# Patient Record
Sex: FEMALE | Race: WHITE | Employment: UNEMPLOYED | URBAN - METROPOLITAN AREA
[De-identification: names, ages, dates, MRNs, and addresses within clinical notes are randomized per-mention and may not be internally consistent; named-entity substitution may affect disease eponyms.]

---

## 2017-03-30 ENCOUNTER — OFFICE VISIT (OUTPATIENT)
Dept: INTERNAL MEDICINE CLINIC | Facility: CLINIC | Age: 62
End: 2017-03-30

## 2017-03-30 VITALS
HEIGHT: 60 IN | TEMPERATURE: 99 F | BODY MASS INDEX: 54.19 KG/M2 | SYSTOLIC BLOOD PRESSURE: 139 MMHG | HEART RATE: 89 BPM | DIASTOLIC BLOOD PRESSURE: 79 MMHG | WEIGHT: 276 LBS

## 2017-03-30 DIAGNOSIS — J01.90 ACUTE NON-RECURRENT SINUSITIS, UNSPECIFIED LOCATION: Primary | ICD-10-CM

## 2017-03-30 DIAGNOSIS — R73.03 PREDIABETES: ICD-10-CM

## 2017-03-30 DIAGNOSIS — E66.9 OBESITY, UNSPECIFIED OBESITY SEVERITY, UNSPECIFIED OBESITY TYPE: ICD-10-CM

## 2017-03-30 DIAGNOSIS — E53.8 VITAMIN B12 DEFICIENCY: ICD-10-CM

## 2017-03-30 DIAGNOSIS — E78.2 MIXED HYPERLIPIDEMIA: ICD-10-CM

## 2017-03-30 PROCEDURE — 99212 OFFICE O/P EST SF 10 MIN: CPT | Performed by: INTERNAL MEDICINE

## 2017-03-30 PROCEDURE — 99213 OFFICE O/P EST LOW 20 MIN: CPT | Performed by: INTERNAL MEDICINE

## 2017-03-30 RX ORDER — FLUTICASONE PROPIONATE 50 MCG
SPRAY, SUSPENSION (ML) NASAL
Qty: 1 INHALER | Refills: 2 | Status: SHIPPED | OUTPATIENT
Start: 2017-03-30

## 2017-03-30 RX ORDER — DOXYCYCLINE 100 MG/1
100 TABLET ORAL 2 TIMES DAILY
Qty: 20 TABLET | Refills: 0 | Status: SHIPPED | OUTPATIENT
Start: 2017-03-30 | End: 2017-04-28 | Stop reason: ALTCHOICE

## 2017-03-30 NOTE — PROGRESS NOTES
HPI:    Patient ID: Cata Saleem is a 65year old female. Sinus Problem  This is a new problem. The current episode started in the past 7 days (4 days). The problem is unchanged. There has been no fever. The pain is moderate.  Associated symptoms i cyanocobalamin 1000 MCG/ML Injection Solution Inject 1 ml (1,000 mcg total) into the muscle every 15 days Disp: 24 vial Rfl: 0     Allergies:  Cefprozil                   Comment:Other reaction(s): CEFPROZIL   PHYSICAL EXAM:   Physical Exam   Constitutio unspecified obesity type  Plan: pt had been having difficulty of losing weight inspite of exercise or diet. We will check her tsh. (E53.8) Vitamin B12 deficiency  Plan: VITAMIN B12        Pt had been getting her vit B 12 shot at home.  Recheck vit B12 le

## 2017-04-11 ENCOUNTER — OFFICE VISIT (OUTPATIENT)
Dept: INTERNAL MEDICINE CLINIC | Facility: CLINIC | Age: 62
End: 2017-04-11

## 2017-04-11 ENCOUNTER — HOSPITAL ENCOUNTER (OUTPATIENT)
Dept: GENERAL RADIOLOGY | Age: 62
Discharge: HOME | End: 2017-04-11
Attending: INTERNAL MEDICINE | Admitting: INTERNAL MEDICINE
Payer: COMMERCIAL

## 2017-04-11 VITALS
WEIGHT: 275 LBS | HEART RATE: 84 BPM | SYSTOLIC BLOOD PRESSURE: 134 MMHG | TEMPERATURE: 98 F | RESPIRATION RATE: 12 BRPM | HEIGHT: 61 IN | OXYGEN SATURATION: 95 % | BODY MASS INDEX: 51.92 KG/M2 | DIASTOLIC BLOOD PRESSURE: 80 MMHG

## 2017-04-11 DIAGNOSIS — R05.9 COUGH: ICD-10-CM

## 2017-04-11 DIAGNOSIS — R19.7 DIARRHEA OF PRESUMED INFECTIOUS ORIGIN: ICD-10-CM

## 2017-04-11 DIAGNOSIS — R05.9 COUGH: Primary | ICD-10-CM

## 2017-04-11 PROCEDURE — 99212 OFFICE O/P EST SF 10 MIN: CPT | Performed by: INTERNAL MEDICINE

## 2017-04-11 PROCEDURE — 99213 OFFICE O/P EST LOW 20 MIN: CPT | Performed by: INTERNAL MEDICINE

## 2017-04-11 PROCEDURE — 71020 XR CHEST PA + LAT CHEST (CPT=71020): CPT

## 2017-04-11 RX ORDER — CODEINE PHOSPHATE AND GUAIFENESIN 10; 100 MG/5ML; MG/5ML
5 SOLUTION ORAL EVERY 6 HOURS PRN
Qty: 240 ML | Refills: 0 | Status: SHIPPED | OUTPATIENT
Start: 2017-04-11 | End: 2017-08-17 | Stop reason: ALTCHOICE

## 2017-04-11 NOTE — PROGRESS NOTES
HPI:    Patient ID: Tony Cho is a 65year old female. Cough  This is a new problem. The current episode started 1 to 4 weeks ago. The problem has been waxing and waning. The cough is non-productive.  Associated symptoms include nasal congestion tablet by mouth every 6 (six) hours as needed. Disp:  Rfl: 0   furosemide 20 MG Oral Tab Take 1 tablet (20 mg total) by mouth daily.  (Patient taking differently: Take 20 mg by mouth daily as needed.  ) Disp: 30 tablet Rfl: 0   Potassium Chloride ER 10 MEQ noted. She is not diaphoretic. ASSESSMENT/PLAN:   (R05) Cough  (primary encounter diagnosis)  Plan: XR CHEST PA + LAT CHEST ([id number]=[id number])        We did do cxr and showed no acute pulmonary changes or pneumonia.  I told pt this is likely just acut

## 2017-04-14 ENCOUNTER — APPOINTMENT (OUTPATIENT)
Dept: LAB | Age: 62
End: 2017-04-14
Attending: INTERNAL MEDICINE
Payer: COMMERCIAL

## 2017-04-14 DIAGNOSIS — R19.7 DIARRHEA OF PRESUMED INFECTIOUS ORIGIN: ICD-10-CM

## 2017-04-14 PROCEDURE — 87493 C DIFF AMPLIFIED PROBE: CPT

## 2017-04-26 ENCOUNTER — TELEPHONE (OUTPATIENT)
Dept: INTERNAL MEDICINE CLINIC | Facility: CLINIC | Age: 62
End: 2017-04-26

## 2017-04-26 NOTE — TELEPHONE ENCOUNTER
Actions Requested: Dr Oliver Poe can you add the patient to today's schedule? 2 day onset eye pain, bilat blurry vision, yellow crusty eye discharge, pink sclera, swelling eye lids  Situation/Background   Problem: bilat blurry vision with yellow crusty eye 104 F (40 C)  * Discharge from penis  * New or abnormal vaginal discharge  * Using antibiotic eyedrops > 3 days but pus persists  * Lots of yellow or green nasal discharge  * Weak immune system (e.g., HIV positive, cancer chemo, splenectomy, organ transpla

## 2017-04-27 NOTE — TELEPHONE ENCOUNTER
Booked appoint for tomorrow per patient, she has to take her sister to the airport and tomorrow is better for her to see the doctor

## 2017-04-28 ENCOUNTER — OFFICE VISIT (OUTPATIENT)
Dept: INTERNAL MEDICINE CLINIC | Facility: CLINIC | Age: 62
End: 2017-04-28

## 2017-04-28 VITALS
TEMPERATURE: 98 F | HEART RATE: 77 BPM | WEIGHT: 274.31 LBS | SYSTOLIC BLOOD PRESSURE: 134 MMHG | BODY MASS INDEX: 52 KG/M2 | DIASTOLIC BLOOD PRESSURE: 80 MMHG

## 2017-04-28 DIAGNOSIS — H10.33 ACUTE CONJUNCTIVITIS OF BOTH EYES, UNSPECIFIED ACUTE CONJUNCTIVITIS TYPE: Primary | ICD-10-CM

## 2017-04-28 PROCEDURE — 99213 OFFICE O/P EST LOW 20 MIN: CPT | Performed by: INTERNAL MEDICINE

## 2017-04-28 PROCEDURE — 99212 OFFICE O/P EST SF 10 MIN: CPT | Performed by: INTERNAL MEDICINE

## 2017-04-28 RX ORDER — TOBRAMYCIN 3 MG/ML
1 SOLUTION/ DROPS OPHTHALMIC EVERY 6 HOURS
Qty: 1 BOTTLE | Refills: 0 | Status: SHIPPED | OUTPATIENT
Start: 2017-04-28 | End: 2017-08-17 | Stop reason: ALTCHOICE

## 2017-04-29 NOTE — PROGRESS NOTES
HPI:    Patient ID: Kiley Escalera is a 65year old female. Conjunctivitis  This is a new problem. The current episode started in the past 7 days. The problem occurs constantly. The problem has been gradually improving.  Associated symptoms include c Allergies:  Cefprozil                   Comment:Other reaction(s): CEFPROZIL   PHYSICAL EXAM:   Physical Exam   Constitutional: She appears well-developed. No distress.    obese   HENT:   Right Ear: External ear normal.   Left Ear: External ear normal.

## 2017-05-25 ENCOUNTER — OFFICE VISIT (OUTPATIENT)
Dept: INTERNAL MEDICINE CLINIC | Facility: CLINIC | Age: 62
End: 2017-05-25

## 2017-05-25 VITALS
SYSTOLIC BLOOD PRESSURE: 128 MMHG | TEMPERATURE: 98 F | BODY MASS INDEX: 51.35 KG/M2 | DIASTOLIC BLOOD PRESSURE: 78 MMHG | HEIGHT: 61 IN | HEART RATE: 91 BPM | WEIGHT: 272 LBS

## 2017-05-25 DIAGNOSIS — N39.0 URINARY TRACT INFECTION WITHOUT HEMATURIA, SITE UNSPECIFIED: Primary | ICD-10-CM

## 2017-05-25 PROCEDURE — 99213 OFFICE O/P EST LOW 20 MIN: CPT | Performed by: INTERNAL MEDICINE

## 2017-05-25 PROCEDURE — 81002 URINALYSIS NONAUTO W/O SCOPE: CPT | Performed by: INTERNAL MEDICINE

## 2017-05-25 RX ORDER — SULFAMETHOXAZOLE AND TRIMETHOPRIM 800; 160 MG/1; MG/1
1 TABLET ORAL 2 TIMES DAILY
Qty: 20 TABLET | Refills: 0 | Status: ON HOLD | OUTPATIENT
Start: 2017-05-25 | End: 2017-11-13

## 2017-05-25 NOTE — PROGRESS NOTES
HPI:    Patient ID: Nimesh Cordero is a 65year old female. UTI  This is a new problem. The current episode started in the past 7 days (2 days). The problem occurs constantly. The problem has been unchanged.  Associated symptoms include urinary sympt ML Does not apply Misc Use 2 times montly Disp: 100 each Rfl: 0     Allergies:  Cefprozil                   Comment:Other reaction(s): CEFPROZIL   PHYSICAL EXAM:   Physical Exam   Constitutional: She appears well-developed. No distress.    Obese     HENT:

## 2017-06-07 ENCOUNTER — OFFICE VISIT (OUTPATIENT)
Dept: INTERNAL MEDICINE CLINIC | Facility: CLINIC | Age: 62
End: 2017-06-07

## 2017-06-07 VITALS
WEIGHT: 272 LBS | HEART RATE: 82 BPM | TEMPERATURE: 97 F | DIASTOLIC BLOOD PRESSURE: 61 MMHG | RESPIRATION RATE: 12 BRPM | HEIGHT: 61.75 IN | SYSTOLIC BLOOD PRESSURE: 138 MMHG | BODY MASS INDEX: 50.05 KG/M2

## 2017-06-07 DIAGNOSIS — R30.0 DYSURIA: Primary | ICD-10-CM

## 2017-06-07 PROCEDURE — 99213 OFFICE O/P EST LOW 20 MIN: CPT | Performed by: INTERNAL MEDICINE

## 2017-06-07 PROCEDURE — 81003 URINALYSIS AUTO W/O SCOPE: CPT | Performed by: INTERNAL MEDICINE

## 2017-06-07 RX ORDER — CIPROFLOXACIN 250 MG/1
250 TABLET, FILM COATED ORAL 2 TIMES DAILY
Qty: 14 TABLET | Refills: 0 | Status: SHIPPED | OUTPATIENT
Start: 2017-06-07 | End: 2017-06-17

## 2017-06-07 NOTE — PROGRESS NOTES
HPI:    Patient ID: Gucci Ngo is a 65year old female. UTI  This is a new problem. The current episode started 1 to 4 weeks ago. The problem occurs constantly. The problem has been waxing and waning.  Associated symptoms include urinary symptoms cyanocobalamin 1000 MCG/ML Injection Solution Inject 1 ml (1,000 mcg total) into the muscle every 15 days Disp: 24 vial Rfl: 0   guaiFENesin-codeine (CHERATUSSIN AC) 100-10 MG/5ML Oral Solution Take 5 mL by mouth every 6 (six) hours as needed for cough.

## 2017-08-17 ENCOUNTER — OFFICE VISIT (OUTPATIENT)
Dept: INTERNAL MEDICINE CLINIC | Facility: CLINIC | Age: 62
End: 2017-08-17

## 2017-08-17 VITALS
DIASTOLIC BLOOD PRESSURE: 83 MMHG | HEIGHT: 61 IN | WEIGHT: 267 LBS | BODY MASS INDEX: 50.41 KG/M2 | HEART RATE: 78 BPM | SYSTOLIC BLOOD PRESSURE: 131 MMHG

## 2017-08-17 DIAGNOSIS — E53.8 VITAMIN B12 DEFICIENCY: ICD-10-CM

## 2017-08-17 DIAGNOSIS — Z00.00 ANNUAL PHYSICAL EXAM: Primary | ICD-10-CM

## 2017-08-17 DIAGNOSIS — Z12.11 COLON CANCER SCREENING: ICD-10-CM

## 2017-08-17 DIAGNOSIS — E66.01 MORBID OBESITY (HCC): ICD-10-CM

## 2017-08-17 DIAGNOSIS — Z12.39 ENCOUNTER FOR BREAST CANCER SCREENING OTHER THAN MAMMOGRAM: ICD-10-CM

## 2017-08-17 DIAGNOSIS — E78.2 MIXED HYPERLIPIDEMIA: ICD-10-CM

## 2017-08-17 PROCEDURE — 99396 PREV VISIT EST AGE 40-64: CPT | Performed by: INTERNAL MEDICINE

## 2017-08-17 RX ORDER — CYANOCOBALAMIN 1000 UG/ML
INJECTION INTRAMUSCULAR; SUBCUTANEOUS
Qty: 24 VIAL | Refills: 0 | Status: SHIPPED | OUTPATIENT
Start: 2017-08-17 | End: 2018-08-23

## 2017-08-17 NOTE — PROGRESS NOTES
HPI:    Patient ID: Elida Smith is a 65year old female. Patient presents today for her annual physical .        Review of Systems   Constitutional: Negative. HENT: Negative. Eyes: Negative. Respiratory: Negative.     Cardiovascular: Nega Oropharynx is clear and moist.   Eyes: Conjunctivae and EOM are normal. Pupils are equal, round, and reactive to light. Neck: Normal range of motion. Neck supple.    Cardiovascular: Normal rate, regular rhythm, normal heart sounds and intact distal pulses Visit:  Signed Prescriptions Disp Refills    Syringe/Needle, Disp, (BD LUER-MARGARITO SYRINGE) 25G X 1\" 3 ML Does not apply Misc 100 each 0      Sig: Use 2 times montly      cyanocobalamin 1000 MCG/ML Injection Solution 24 vial 0      Sig: Inject 1 ml (1,000 mc

## 2017-08-18 ENCOUNTER — LAB ENCOUNTER (OUTPATIENT)
Dept: LAB | Age: 62
End: 2017-08-18
Attending: INTERNAL MEDICINE
Payer: COMMERCIAL

## 2017-08-18 DIAGNOSIS — Z00.00 ANNUAL PHYSICAL EXAM: ICD-10-CM

## 2017-08-18 DIAGNOSIS — R79.89 ELEVATED TSH: ICD-10-CM

## 2017-08-18 LAB
ALBUMIN SERPL BCP-MCNC: 3.5 G/DL (ref 3.5–4.8)
ALBUMIN/GLOB SERPL: 1.1 {RATIO} (ref 1–2)
ALP SERPL-CCNC: 84 U/L (ref 32–100)
ALT SERPL-CCNC: 27 U/L (ref 14–54)
ANION GAP SERPL CALC-SCNC: 8 MMOL/L (ref 0–18)
AST SERPL-CCNC: 21 U/L (ref 15–41)
BACTERIA UR QL AUTO: NEGATIVE /HPF
BASOPHILS # BLD: 0.1 K/UL (ref 0–0.2)
BASOPHILS NFR BLD: 1 %
BILIRUB SERPL-MCNC: 0.6 MG/DL (ref 0.3–1.2)
BILIRUB UR QL: NEGATIVE
BUN SERPL-MCNC: 11 MG/DL (ref 8–20)
BUN/CREAT SERPL: 17.7 (ref 10–20)
CALCIUM SERPL-MCNC: 8.8 MG/DL (ref 8.5–10.5)
CHLORIDE SERPL-SCNC: 105 MMOL/L (ref 95–110)
CHOLEST SERPL-MCNC: 200 MG/DL (ref 110–200)
CLARITY UR: CLEAR
CO2 SERPL-SCNC: 27 MMOL/L (ref 22–32)
COLOR UR: YELLOW
CREAT SERPL-MCNC: 0.62 MG/DL (ref 0.5–1.5)
EOSINOPHIL # BLD: 0.3 K/UL (ref 0–0.7)
EOSINOPHIL NFR BLD: 3 %
ERYTHROCYTE [DISTWIDTH] IN BLOOD BY AUTOMATED COUNT: 14.6 % (ref 11–15)
GLOBULIN PLAS-MCNC: 3.2 G/DL (ref 2.5–3.7)
GLUCOSE SERPL-MCNC: 142 MG/DL (ref 70–99)
GLUCOSE UR-MCNC: NEGATIVE MG/DL
HBA1C MFR BLD: 6.3 % (ref 4–6)
HCT VFR BLD AUTO: 41.5 % (ref 35–48)
HDLC SERPL-MCNC: 34 MG/DL
HGB BLD-MCNC: 13.5 G/DL (ref 12–16)
HGB UR QL STRIP.AUTO: NEGATIVE
KETONES UR-MCNC: NEGATIVE MG/DL
LDLC SERPL CALC-MCNC: 114 MG/DL (ref 0–99)
LYMPHOCYTES # BLD: 2 K/UL (ref 1–4)
LYMPHOCYTES NFR BLD: 23 %
MCH RBC QN AUTO: 27.2 PG (ref 27–32)
MCHC RBC AUTO-ENTMCNC: 32.5 G/DL (ref 32–37)
MCV RBC AUTO: 83.5 FL (ref 80–100)
MONOCYTES # BLD: 0.3 K/UL (ref 0–1)
MONOCYTES NFR BLD: 4 %
NEUTROPHILS # BLD AUTO: 6.2 K/UL (ref 1.8–7.7)
NEUTROPHILS NFR BLD: 70 %
NITRITE UR QL STRIP.AUTO: NEGATIVE
NONHDLC SERPL-MCNC: 166 MG/DL
OSMOLALITY UR CALC.SUM OF ELEC: 292 MOSM/KG (ref 275–295)
PH UR: 5 [PH] (ref 5–8)
PLATELET # BLD AUTO: 386 K/UL (ref 140–400)
PMV BLD AUTO: 8 FL (ref 7.4–10.3)
POTASSIUM SERPL-SCNC: 4.1 MMOL/L (ref 3.3–5.1)
PROT SERPL-MCNC: 6.7 G/DL (ref 5.9–8.4)
PROT UR-MCNC: NEGATIVE MG/DL
RBC # BLD AUTO: 4.97 M/UL (ref 3.7–5.4)
RBC #/AREA URNS AUTO: <1 /HPF
SODIUM SERPL-SCNC: 140 MMOL/L (ref 136–144)
SP GR UR STRIP: 1.01 (ref 1–1.03)
TRIGL SERPL-MCNC: 261 MG/DL (ref 1–149)
TSH SERPL-ACNC: 7.81 UIU/ML (ref 0.45–5.33)
UROBILINOGEN UR STRIP-ACNC: <2
VIT C UR-MCNC: NEGATIVE MG/DL
WBC # BLD AUTO: 8.9 K/UL (ref 4–11)
WBC #/AREA URNS AUTO: 3 /HPF

## 2017-08-18 PROCEDURE — 36415 COLL VENOUS BLD VENIPUNCTURE: CPT

## 2017-08-18 PROCEDURE — 84439 ASSAY OF FREE THYROXINE: CPT

## 2017-08-18 PROCEDURE — 84481 FREE ASSAY (FT-3): CPT

## 2017-08-18 PROCEDURE — 84443 ASSAY THYROID STIM HORMONE: CPT

## 2017-08-18 PROCEDURE — 85025 COMPLETE CBC W/AUTO DIFF WBC: CPT

## 2017-08-18 PROCEDURE — 81001 URINALYSIS AUTO W/SCOPE: CPT

## 2017-08-18 PROCEDURE — 80061 LIPID PANEL: CPT

## 2017-08-18 PROCEDURE — 80053 COMPREHEN METABOLIC PANEL: CPT

## 2017-08-18 PROCEDURE — 83036 HEMOGLOBIN GLYCOSYLATED A1C: CPT

## 2017-08-21 ENCOUNTER — TELEPHONE (OUTPATIENT)
Dept: INTERNAL MEDICINE CLINIC | Facility: CLINIC | Age: 62
End: 2017-08-21

## 2017-08-21 DIAGNOSIS — R79.89 ELEVATED TSH: Primary | ICD-10-CM

## 2017-08-22 LAB
T3FREE SERPL-MCNC: 2.66 PG/ML (ref 2.53–4.29)
T4 FREE SERPL-MCNC: 0.75 NG/DL (ref 0.58–1.64)

## 2017-08-29 RX ORDER — ATORVASTATIN CALCIUM 10 MG/1
10 TABLET, FILM COATED ORAL NIGHTLY
Qty: 90 TABLET | Refills: 0 | Status: ON HOLD | OUTPATIENT
Start: 2017-08-29 | End: 2017-11-13

## 2017-08-29 RX ORDER — METFORMIN HYDROCHLORIDE 750 MG/1
750 TABLET, EXTENDED RELEASE ORAL
Qty: 90 TABLET | Refills: 0 | Status: ON HOLD | OUTPATIENT
Start: 2017-08-29 | End: 2017-11-13

## 2017-08-29 NOTE — TELEPHONE ENCOUNTER
Notes Recorded by Zainab Ruiz MD on 8/28/2017 at 11:01 PM CDT  Notify pt   Her labs shows her a1c is going up now at 6.3 and her glucose at 142 which is diabetic range already.    Her LDL bad cholesterol elevated at 114 and triglyceride mildly daysi

## 2017-08-29 NOTE — TELEPHONE ENCOUNTER
Patient called and informed with understanding. Medication sent to the pharmacy. Upon explaining the diet the patient stated  \"I know the diet. \"  No need to explain.

## 2017-09-28 PROBLEM — E66.01 MORBID OBESITY WITH BMI OF 45.0-49.9, ADULT (HCC): Status: ACTIVE | Noted: 2017-09-28

## 2017-09-28 PROBLEM — M47.816 LUMBAR SPONDYLOSIS: Status: ACTIVE | Noted: 2017-09-28

## 2017-10-20 NOTE — TELEPHONE ENCOUNTER
Lmtcb, left message to call back and confirm that she will see Dr Shantell Noel at 1:30 today CARMENZA    CSS please add patient to schedule when she calls back 19-Oct-2017

## 2017-11-13 ENCOUNTER — SURGERY (OUTPATIENT)
Age: 62
End: 2017-11-13

## 2017-11-13 ENCOUNTER — HOSPITAL ENCOUNTER (OUTPATIENT)
Facility: HOSPITAL | Age: 62
Setting detail: HOSPITAL OUTPATIENT SURGERY
Discharge: HOME | End: 2017-11-13
Attending: INTERNAL MEDICINE | Admitting: INTERNAL MEDICINE
Payer: COMMERCIAL

## 2017-11-13 DIAGNOSIS — Z12.11 SPECIAL SCREENING FOR MALIGNANT NEOPLASM OF COLON: ICD-10-CM

## 2017-11-13 PROCEDURE — 88305 TISSUE EXAM BY PATHOLOGIST: CPT | Performed by: INTERNAL MEDICINE

## 2017-11-13 PROCEDURE — 0DBH8ZX EXCISION OF CECUM, VIA NATURAL OR ARTIFICIAL OPENING ENDOSCOPIC, DIAGNOSTIC: ICD-10-PCS | Performed by: INTERNAL MEDICINE

## 2017-11-13 RX ORDER — MIDAZOLAM HYDROCHLORIDE 1 MG/ML
INJECTION INTRAMUSCULAR; INTRAVENOUS
Status: DISCONTINUED | OUTPATIENT
Start: 2017-11-13 | End: 2017-11-13

## 2017-11-13 RX ORDER — SODIUM CHLORIDE 0.9 % (FLUSH) 0.9 %
10 SYRINGE (ML) INJECTION AS NEEDED
Status: DISCONTINUED | OUTPATIENT
Start: 2017-11-13 | End: 2017-11-13

## 2017-11-13 RX ORDER — MIDAZOLAM HYDROCHLORIDE 1 MG/ML
1 INJECTION INTRAMUSCULAR; INTRAVENOUS EVERY 5 MIN PRN
Status: DISCONTINUED | OUTPATIENT
Start: 2017-11-13 | End: 2017-11-13

## 2017-11-13 RX ORDER — SODIUM CHLORIDE, SODIUM LACTATE, POTASSIUM CHLORIDE, CALCIUM CHLORIDE 600; 310; 30; 20 MG/100ML; MG/100ML; MG/100ML; MG/100ML
INJECTION, SOLUTION INTRAVENOUS CONTINUOUS
Status: DISCONTINUED | OUTPATIENT
Start: 2017-11-13 | End: 2017-11-13

## 2017-11-13 NOTE — OPERATIVE REPORT
[facility]    PATIENT'S NAME: Briana Rainey   ATTENDING PHYSICIAN: Concha Desai MD   OPERATING PHYSICIAN: Concha Desai MD   PATIENT ACCOUNT#:   [id number]    LOCATION:  [facility] ROOM [address] [place] [address]  MEDICAL RECORD #:   H cecal polyp. Dictated By Cuong Herrera MD  d: 11/13/2017 08:28:05  t: 11/13/2017 08:37:44  Job [id number]  JAL/    cc: Yumiko Wills.  Aracelis Chino MD

## 2017-11-13 NOTE — H&P
[facility]    History and Physical    Lendia Lombard Patient Status:  Hospital Outpatient Surgery    1955 MRN [id number]   Location [facility] ENDOSCOPY LAB SUITES Attending Valarie Sommers MD   Hosp Day # 0 AMINATA Henry SYRINGE) 25G X 1\" 3 ML Does not apply Misc Use 2 times montly   cyanocobalamin 1000 MCG/ML Injection Solution Inject 1 ml (1,000 mcg total) into the muscle every 15 days   Homeopathic Products (IRRITATED EYE RELIEF OP) Apply to eye.    Fluticasone Propiona 08/18/2017    (H) 08/18/2017   CA 8.8 08/18/2017   ALB 3.5 08/18/2017   ALKPHO 84 08/18/2017   BILT 0.6 08/18/2017   TP 6.7 08/18/2017   AST 21 08/18/2017   ALT 27 08/18/2017   T4F 0.75 08/18/2017   TSH 7.81 (H) 08/18/2017               Assessment/P

## 2017-11-15 VITALS
OXYGEN SATURATION: 91 % | HEIGHT: 61 IN | BODY MASS INDEX: 48.33 KG/M2 | SYSTOLIC BLOOD PRESSURE: 124 MMHG | DIASTOLIC BLOOD PRESSURE: 84 MMHG | RESPIRATION RATE: 13 BRPM | WEIGHT: 256 LBS | HEART RATE: 78 BPM

## 2018-03-10 ENCOUNTER — OFFICE VISIT (OUTPATIENT)
Dept: INTERNAL MEDICINE CLINIC | Facility: CLINIC | Age: 63
End: 2018-03-10

## 2018-03-10 ENCOUNTER — NURSE TRIAGE (OUTPATIENT)
Dept: OTHER | Age: 63
End: 2018-03-10

## 2018-03-10 VITALS — TEMPERATURE: 98 F | SYSTOLIC BLOOD PRESSURE: 133 MMHG | HEART RATE: 97 BPM | DIASTOLIC BLOOD PRESSURE: 80 MMHG

## 2018-03-10 DIAGNOSIS — Z12.39 BREAST CANCER SCREENING: ICD-10-CM

## 2018-03-10 DIAGNOSIS — J01.90 ACUTE NON-RECURRENT SINUSITIS, UNSPECIFIED LOCATION: Primary | ICD-10-CM

## 2018-03-10 DIAGNOSIS — E03.8 SUBCLINICAL HYPOTHYROIDISM: ICD-10-CM

## 2018-03-10 DIAGNOSIS — E78.2 MIXED HYPERLIPIDEMIA: ICD-10-CM

## 2018-03-10 DIAGNOSIS — E11.9 CONTROLLED TYPE 2 DIABETES MELLITUS WITHOUT COMPLICATION, WITHOUT LONG-TERM CURRENT USE OF INSULIN (HCC): ICD-10-CM

## 2018-03-10 PROCEDURE — 99212 OFFICE O/P EST SF 10 MIN: CPT | Performed by: INTERNAL MEDICINE

## 2018-03-10 PROCEDURE — 99214 OFFICE O/P EST MOD 30 MIN: CPT | Performed by: INTERNAL MEDICINE

## 2018-03-10 RX ORDER — METFORMIN HYDROCHLORIDE 750 MG/1
1 TABLET, EXTENDED RELEASE ORAL DAILY
Refills: 0 | COMMUNITY
Start: 2018-01-29 | End: 2018-11-19

## 2018-03-10 RX ORDER — DOXYCYCLINE 100 MG/1
100 TABLET ORAL 2 TIMES DAILY
Qty: 20 TABLET | Refills: 0 | Status: SHIPPED | OUTPATIENT
Start: 2018-03-10 | End: 2018-11-19

## 2018-03-10 NOTE — PROGRESS NOTES
HPI:    Patient ID: Eran Senior is a 59year old female. Sinusitis   This is a new problem. The current episode started in the past 7 days. The problem is unchanged. There has been no fever. The pain is mild.  Associated symptoms include congestio Ear: External ear normal.   Nose: Mucosal edema present. No epistaxis. Right sinus exhibits frontal sinus tenderness. Left sinus exhibits maxillary sinus tenderness and frontal sinus tenderness.    Mouth/Throat: Oropharynx is clear and moist. No oropharynge placed in this encounter.       Meds This Visit:  No prescriptions requested or ordered in this encounter       Imaging & Referrals:  None       #[id number]

## 2018-03-10 NOTE — TELEPHONE ENCOUNTER
Per [facility] message, Dr. Chela Herr pt needs to come in right away before they close. Pt contacted and stts she will be there in 10 minutes.

## 2018-03-10 NOTE — TELEPHONE ENCOUNTER
Action Requested: Summary for Provider     []  Critical Lab, Recommendations Needed  [x] Need Additional Advice  []   FYI    []   Need Orders  [] Need Medications Sent to Pharmacy  []  Other     SUMMARY: Pt stts she developed a sore throat,runny nose,right

## 2018-08-16 ENCOUNTER — OFFICE VISIT (OUTPATIENT)
Dept: INTERNAL MEDICINE CLINIC | Facility: CLINIC | Age: 63
End: 2018-08-16
Payer: COMMERCIAL

## 2018-08-16 VITALS
TEMPERATURE: 98 F | HEART RATE: 84 BPM | SYSTOLIC BLOOD PRESSURE: 130 MMHG | BODY MASS INDEX: 48.9 KG/M2 | DIASTOLIC BLOOD PRESSURE: 80 MMHG | WEIGHT: 259 LBS | HEIGHT: 61 IN

## 2018-08-16 DIAGNOSIS — Z00.00 ANNUAL PHYSICAL EXAM: Primary | ICD-10-CM

## 2018-08-16 DIAGNOSIS — R73.03 PREDIABETES: ICD-10-CM

## 2018-08-16 DIAGNOSIS — E53.8 VITAMIN B12 DEFICIENCY: ICD-10-CM

## 2018-08-16 DIAGNOSIS — E78.2 MIXED HYPERLIPIDEMIA: ICD-10-CM

## 2018-08-16 DIAGNOSIS — R07.9 CHEST PAIN, UNSPECIFIED TYPE: ICD-10-CM

## 2018-08-16 DIAGNOSIS — E03.8 SUBCLINICAL HYPOTHYROIDISM: ICD-10-CM

## 2018-08-16 PROCEDURE — 99396 PREV VISIT EST AGE 40-64: CPT | Performed by: INTERNAL MEDICINE

## 2018-08-16 PROCEDURE — 93005 ELECTROCARDIOGRAM TRACING: CPT | Performed by: INTERNAL MEDICINE

## 2018-08-16 PROCEDURE — 93010 ELECTROCARDIOGRAM REPORT: CPT | Performed by: INTERNAL MEDICINE

## 2018-08-16 NOTE — PROGRESS NOTES
HPI:    Patient ID: Lendia Lombard is a 59year old female. Patient presents today for her annual physical.       Chest Pain    This is a new problem. The current episode started more than 1 month ago. The onset quality is sudden.  The problem has be Disp: 1 Inhaler Rfl: 0   MetFORMIN HCl  MG Oral Tablet 24 Hr Take 1 tablet by mouth daily.  Disp:  Rfl: 0     Allergies:  Cefprozil                   Comment:Other reaction(s): CEFPROZIL   PHYSICAL EXAM:   Physical Exam   Constitutional: She appears w nature, she does have risk factors for CAD. She was advised to get stress test done but she declined and would like to observe.  Pt told to go to ER then if she gets another episode.    (E78.2) Mixed hyperlipidemia  Plan: check lipid panel and cmp; continue

## 2018-08-17 ENCOUNTER — LAB ENCOUNTER (OUTPATIENT)
Dept: LAB | Age: 63
End: 2018-08-17
Attending: INTERNAL MEDICINE
Payer: COMMERCIAL

## 2018-08-17 DIAGNOSIS — E78.2 MIXED HYPERLIPIDEMIA: ICD-10-CM

## 2018-08-17 DIAGNOSIS — E03.8 SUBCLINICAL HYPOTHYROIDISM: ICD-10-CM

## 2018-08-17 DIAGNOSIS — Z00.00 ANNUAL PHYSICAL EXAM: ICD-10-CM

## 2018-08-17 DIAGNOSIS — E11.9 CONTROLLED TYPE 2 DIABETES MELLITUS WITHOUT COMPLICATION, WITHOUT LONG-TERM CURRENT USE OF INSULIN (HCC): ICD-10-CM

## 2018-08-17 LAB
25(OH)D3 SERPL-MCNC: 13.1 NG/ML
ALBUMIN SERPL BCP-MCNC: 3.7 G/DL (ref 3.5–4.8)
ALBUMIN/GLOB SERPL: 1.2 {RATIO} (ref 1–2)
ALP SERPL-CCNC: 78 U/L (ref 32–100)
ALT SERPL-CCNC: 16 U/L (ref 14–54)
ANION GAP SERPL CALC-SCNC: 9 MMOL/L (ref 0–18)
AST SERPL-CCNC: 18 U/L (ref 15–41)
BASOPHILS # BLD: 0.1 K/UL (ref 0–0.2)
BASOPHILS NFR BLD: 1 %
BILIRUB SERPL-MCNC: 0.5 MG/DL (ref 0.3–1.2)
BUN SERPL-MCNC: 12 MG/DL (ref 8–20)
BUN/CREAT SERPL: 17.1 (ref 10–20)
CALCIUM SERPL-MCNC: 8.8 MG/DL (ref 8.5–10.5)
CHLORIDE SERPL-SCNC: 105 MMOL/L (ref 95–110)
CHOLEST SERPL-MCNC: 212 MG/DL (ref 110–200)
CO2 SERPL-SCNC: 25 MMOL/L (ref 22–32)
CREAT SERPL-MCNC: 0.7 MG/DL (ref 0.5–1.5)
CREAT UR-MCNC: 90.1 MG/DL
EOSINOPHIL # BLD: 0.2 K/UL (ref 0–0.7)
EOSINOPHIL NFR BLD: 3 %
ERYTHROCYTE [DISTWIDTH] IN BLOOD BY AUTOMATED COUNT: 14.8 % (ref 11–15)
GLOBULIN PLAS-MCNC: 3.1 G/DL (ref 2.5–3.7)
GLUCOSE SERPL-MCNC: 101 MG/DL (ref 70–99)
HBA1C MFR BLD: 5.9 % (ref 4–6)
HCT VFR BLD AUTO: 38.8 % (ref 35–48)
HDLC SERPL-MCNC: 36 MG/DL
HGB BLD-MCNC: 12.6 G/DL (ref 12–16)
LDLC SERPL CALC-MCNC: 120 MG/DL (ref 0–99)
LYMPHOCYTES # BLD: 2.2 K/UL (ref 1–4)
LYMPHOCYTES NFR BLD: 31 %
MCH RBC QN AUTO: 27.5 PG (ref 27–32)
MCHC RBC AUTO-ENTMCNC: 32.6 G/DL (ref 32–37)
MCV RBC AUTO: 84.4 FL (ref 80–100)
MICROALBUMIN UR-MCNC: 1.2 MG/DL (ref 0–1.8)
MICROALBUMIN/CREAT UR: 13.3 MG/G{CREAT} (ref 0–20)
MONOCYTES # BLD: 0.4 K/UL (ref 0–1)
MONOCYTES NFR BLD: 5 %
NEUTROPHILS # BLD AUTO: 4.3 K/UL (ref 1.8–7.7)
NEUTROPHILS NFR BLD: 60 %
NONHDLC SERPL-MCNC: 176 MG/DL
OSMOLALITY UR CALC.SUM OF ELEC: 288 MOSM/KG (ref 275–295)
PATIENT FASTING: YES
PLATELET # BLD AUTO: 357 K/UL (ref 140–400)
PMV BLD AUTO: 7.7 FL (ref 7.4–10.3)
POTASSIUM SERPL-SCNC: 3.9 MMOL/L (ref 3.3–5.1)
PROT SERPL-MCNC: 6.8 G/DL (ref 5.9–8.4)
RBC # BLD AUTO: 4.6 M/UL (ref 3.7–5.4)
SODIUM SERPL-SCNC: 139 MMOL/L (ref 136–144)
T3FREE SERPL-MCNC: 2.83 PG/ML (ref 2.53–4.29)
T4 FREE SERPL-MCNC: 0.75 NG/DL (ref 0.58–1.64)
TRIGL SERPL-MCNC: 281 MG/DL (ref 1–149)
TSH SERPL-ACNC: 6.38 UIU/ML (ref 0.45–5.33)
VIT B12 SERPL-MCNC: 543 PG/ML (ref 181–914)
WBC # BLD AUTO: 7.2 K/UL (ref 4–11)

## 2018-08-17 PROCEDURE — 84439 ASSAY OF FREE THYROXINE: CPT

## 2018-08-17 PROCEDURE — 84481 FREE ASSAY (FT-3): CPT

## 2018-08-17 PROCEDURE — 82607 VITAMIN B-12: CPT | Performed by: INTERNAL MEDICINE

## 2018-08-17 PROCEDURE — 83036 HEMOGLOBIN GLYCOSYLATED A1C: CPT

## 2018-08-17 PROCEDURE — 80061 LIPID PANEL: CPT

## 2018-08-17 PROCEDURE — 80053 COMPREHEN METABOLIC PANEL: CPT

## 2018-08-17 PROCEDURE — 36415 COLL VENOUS BLD VENIPUNCTURE: CPT

## 2018-08-17 PROCEDURE — 82570 ASSAY OF URINE CREATININE: CPT | Performed by: INTERNAL MEDICINE

## 2018-08-17 PROCEDURE — 82043 UR ALBUMIN QUANTITATIVE: CPT | Performed by: INTERNAL MEDICINE

## 2018-08-17 PROCEDURE — 84443 ASSAY THYROID STIM HORMONE: CPT

## 2018-08-17 PROCEDURE — 85025 COMPLETE CBC W/AUTO DIFF WBC: CPT

## 2018-08-17 PROCEDURE — 82306 VITAMIN D 25 HYDROXY: CPT

## 2018-08-18 ENCOUNTER — TELEPHONE (OUTPATIENT)
Dept: INTERNAL MEDICINE CLINIC | Facility: CLINIC | Age: 63
End: 2018-08-18

## 2018-08-18 DIAGNOSIS — R79.89 ABNORMAL TSH: Primary | ICD-10-CM

## 2018-08-18 DIAGNOSIS — E55.9 VITAMIN D DEFICIENCY: ICD-10-CM

## 2018-08-18 PROBLEM — R73.03 PREDIABETES: Status: ACTIVE | Noted: 2018-08-18

## 2018-08-18 PROBLEM — E03.8 SUBCLINICAL HYPOTHYROIDISM: Status: ACTIVE | Noted: 2018-08-18

## 2018-08-18 PROBLEM — E53.8 VITAMIN B12 DEFICIENCY: Status: ACTIVE | Noted: 2018-08-18

## 2018-08-18 PROBLEM — R07.9 CHEST PAIN: Status: ACTIVE | Noted: 2018-08-18

## 2018-08-18 PROBLEM — R19.7 DIARRHEA OF PRESUMED INFECTIOUS ORIGIN: Status: RESOLVED | Noted: 2017-04-11 | Resolved: 2018-08-18

## 2018-08-18 PROBLEM — H10.33 ACUTE CONJUNCTIVITIS OF BOTH EYES: Status: RESOLVED | Noted: 2017-04-28 | Resolved: 2018-08-18

## 2018-08-18 PROBLEM — E78.2 MIXED HYPERLIPIDEMIA: Status: ACTIVE | Noted: 2018-08-18

## 2018-08-18 RX ORDER — ERGOCALCIFEROL 1.25 MG/1
50000 CAPSULE ORAL WEEKLY
Qty: 12 CAPSULE | Refills: 0 | Status: SHIPPED | OUTPATIENT
Start: 2018-08-18 | End: 2018-09-17

## 2018-08-22 NOTE — TELEPHONE ENCOUNTER
Lab ordered Vitamin D and TSH, T3 and T4. Spoke with patient (identified name and ), results reviewed and agrees with plan.       Notes recorded by Jose Santiago RN on 2018 at 12:36 PM CDT  lmtcb transfer to triage RN  ------    Notes recorded by

## 2018-08-27 RX ORDER — CYANOCOBALAMIN 1000 UG/ML
INJECTION INTRAMUSCULAR; SUBCUTANEOUS
Qty: 24 ML | Refills: 0 | Status: SHIPPED | OUTPATIENT
Start: 2018-08-27 | End: 2019-10-05

## 2018-11-19 ENCOUNTER — OFFICE VISIT (OUTPATIENT)
Dept: INTERNAL MEDICINE CLINIC | Facility: CLINIC | Age: 63
End: 2018-11-19
Payer: COMMERCIAL

## 2018-11-19 VITALS
WEIGHT: 269 LBS | HEIGHT: 60.5 IN | DIASTOLIC BLOOD PRESSURE: 83 MMHG | BODY MASS INDEX: 51.45 KG/M2 | SYSTOLIC BLOOD PRESSURE: 129 MMHG | TEMPERATURE: 99 F | HEART RATE: 86 BPM | RESPIRATION RATE: 12 BRPM

## 2018-11-19 DIAGNOSIS — J01.90 ACUTE SINUSITIS, RECURRENCE NOT SPECIFIED, UNSPECIFIED LOCATION: Primary | ICD-10-CM

## 2018-11-19 PROCEDURE — 99212 OFFICE O/P EST SF 10 MIN: CPT | Performed by: INTERNAL MEDICINE

## 2018-11-19 PROCEDURE — 99214 OFFICE O/P EST MOD 30 MIN: CPT | Performed by: INTERNAL MEDICINE

## 2018-11-19 RX ORDER — DOXYCYCLINE 100 MG/1
100 TABLET ORAL 2 TIMES DAILY
Qty: 20 TABLET | Refills: 0 | Status: SHIPPED | OUTPATIENT
Start: 2018-11-19 | End: 2020-09-19 | Stop reason: ALTCHOICE

## 2018-11-19 RX ORDER — ALBUTEROL SULFATE 90 UG/1
2 AEROSOL, METERED RESPIRATORY (INHALATION) EVERY 4 HOURS PRN
Qty: 1 INHALER | Refills: 0 | Status: SHIPPED | OUTPATIENT
Start: 2018-11-19 | End: 2021-10-18

## 2018-11-19 NOTE — PROGRESS NOTES
HPI:    Patient ID: Tia Brunner is a 59year old female. Cough   This is a new problem. The current episode started 1 to 4 weeks ago (2 weeks). The problem has been unchanged. The problem occurs every few hours.  The cough is productive of purulen External ear normal.   Nose: Mucosal edema and rhinorrhea present. Right sinus exhibits maxillary sinus tenderness and frontal sinus tenderness. Left sinus exhibits maxillary sinus tenderness and frontal sinus tenderness.    Mouth/Throat: Oropharyngeal exud

## 2018-11-20 ENCOUNTER — TELEPHONE (OUTPATIENT)
Dept: INTERNAL MEDICINE CLINIC | Facility: CLINIC | Age: 63
End: 2018-11-20

## 2018-11-20 NOTE — TELEPHONE ENCOUNTER
proair hfa oral inh not covered by patient plan.  The preferred alternative is VENTOLINHFAAER, please call the pharmacy to change medication along with strength, directions, aty and refills

## 2019-10-05 NOTE — TELEPHONE ENCOUNTER
Review pended refill request as it does not fall under a protocol.   Requested Prescriptions     Pending Prescriptions Disp Refills   • CYANOCOBALAMIN 1000 MCG/ML Injection Solution [Pharmacy Med Name: CYANOCOBALAMIN 1000MCG/ML INJ, 1ML] 24 mL 0     Sig: IN

## 2019-10-06 RX ORDER — CYANOCOBALAMIN 1000 UG/ML
INJECTION INTRAMUSCULAR; SUBCUTANEOUS
Qty: 24 ML | Refills: 1 | Status: SHIPPED | OUTPATIENT
Start: 2019-10-06

## 2019-10-17 ENCOUNTER — OFFICE VISIT (OUTPATIENT)
Dept: INTERNAL MEDICINE CLINIC | Facility: CLINIC | Age: 64
End: 2019-10-17
Payer: COMMERCIAL

## 2019-10-17 ENCOUNTER — LAB ENCOUNTER (OUTPATIENT)
Dept: LAB | Age: 64
End: 2019-10-17
Attending: INTERNAL MEDICINE
Payer: COMMERCIAL

## 2019-10-17 VITALS
WEIGHT: 259 LBS | TEMPERATURE: 98 F | DIASTOLIC BLOOD PRESSURE: 76 MMHG | BODY MASS INDEX: 47.66 KG/M2 | HEART RATE: 64 BPM | SYSTOLIC BLOOD PRESSURE: 122 MMHG | HEIGHT: 61.75 IN

## 2019-10-17 DIAGNOSIS — E78.2 MIXED HYPERLIPIDEMIA: ICD-10-CM

## 2019-10-17 DIAGNOSIS — E03.8 SUBCLINICAL HYPOTHYROIDISM: ICD-10-CM

## 2019-10-17 DIAGNOSIS — Z12.39 BREAST CANCER SCREENING: ICD-10-CM

## 2019-10-17 DIAGNOSIS — R73.03 PREDIABETES: ICD-10-CM

## 2019-10-17 DIAGNOSIS — R07.2 PRECORDIAL PAIN: ICD-10-CM

## 2019-10-17 DIAGNOSIS — Z00.00 ANNUAL PHYSICAL EXAM: Primary | ICD-10-CM

## 2019-10-17 DIAGNOSIS — Z00.00 ANNUAL PHYSICAL EXAM: ICD-10-CM

## 2019-10-17 DIAGNOSIS — E53.8 VITAMIN B12 DEFICIENCY: ICD-10-CM

## 2019-10-17 PROCEDURE — 80061 LIPID PANEL: CPT

## 2019-10-17 PROCEDURE — 84481 FREE ASSAY (FT-3): CPT

## 2019-10-17 PROCEDURE — 82570 ASSAY OF URINE CREATININE: CPT

## 2019-10-17 PROCEDURE — 80053 COMPREHEN METABOLIC PANEL: CPT

## 2019-10-17 PROCEDURE — 99396 PREV VISIT EST AGE 40-64: CPT | Performed by: INTERNAL MEDICINE

## 2019-10-17 PROCEDURE — 36415 COLL VENOUS BLD VENIPUNCTURE: CPT

## 2019-10-17 PROCEDURE — 85025 COMPLETE CBC W/AUTO DIFF WBC: CPT

## 2019-10-17 PROCEDURE — 81001 URINALYSIS AUTO W/SCOPE: CPT

## 2019-10-17 PROCEDURE — 84439 ASSAY OF FREE THYROXINE: CPT

## 2019-10-17 PROCEDURE — 93010 ELECTROCARDIOGRAM REPORT: CPT | Performed by: INTERNAL MEDICINE

## 2019-10-17 PROCEDURE — 83036 HEMOGLOBIN GLYCOSYLATED A1C: CPT

## 2019-10-17 PROCEDURE — 82043 UR ALBUMIN QUANTITATIVE: CPT

## 2019-10-17 PROCEDURE — 93005 ELECTROCARDIOGRAM TRACING: CPT | Performed by: INTERNAL MEDICINE

## 2019-10-17 PROCEDURE — 84443 ASSAY THYROID STIM HORMONE: CPT

## 2019-10-17 NOTE — PROGRESS NOTES
HPI:    Patient ID: René Murray is a 62year old female. Patient presents today for her annual physical.     Chest Pain    This is a chronic problem. The current episode started more than 1 month ago (6mos). The onset quality is sudden.  The probl LIQUID AND USE 2 SPRAYS IN EACH NOSTRIL DAILY, Disp: 1 Inhaler, Rfl: 2  Beclomethasone Dipropionate (QVAR) 40 MCG/ACT Inhalation Aero Soln, Inhale 2 puffs (0.08 mg total) into the lungs 2 (two) times daily. , Disp: 1 Inhaler, Rfl: 0      Allergies:  Ciprofl STIM         HORMONE, FREE T3 (TRIIODOTHYRONINE), FREE T4         (FREE THYROXINE)        Routine labs ordered. Pt will get his flu shot and pneumococcal vaccine from her pharmacy. Pt to see her gyne for her papsmear.  She is up to date with her colonoscopy

## 2019-10-27 ENCOUNTER — TELEPHONE (OUTPATIENT)
Dept: INTERNAL MEDICINE CLINIC | Facility: CLINIC | Age: 64
End: 2019-10-27

## 2019-10-27 DIAGNOSIS — R82.81 PYURIA: Primary | ICD-10-CM

## 2019-11-02 ENCOUNTER — APPOINTMENT (OUTPATIENT)
Dept: LAB | Age: 64
End: 2019-11-02
Attending: INTERNAL MEDICINE
Payer: COMMERCIAL

## 2019-11-02 DIAGNOSIS — R82.81 PYURIA: ICD-10-CM

## 2019-11-02 PROCEDURE — 87086 URINE CULTURE/COLONY COUNT: CPT

## 2020-01-23 ENCOUNTER — NURSE TRIAGE (OUTPATIENT)
Dept: INTERNAL MEDICINE CLINIC | Facility: CLINIC | Age: 65
End: 2020-01-23

## 2020-01-23 ENCOUNTER — OFFICE VISIT (OUTPATIENT)
Dept: INTERNAL MEDICINE CLINIC | Facility: CLINIC | Age: 65
End: 2020-01-23
Payer: COMMERCIAL

## 2020-01-23 VITALS
DIASTOLIC BLOOD PRESSURE: 80 MMHG | TEMPERATURE: 98 F | HEART RATE: 85 BPM | HEIGHT: 61.75 IN | SYSTOLIC BLOOD PRESSURE: 124 MMHG | BODY MASS INDEX: 48.95 KG/M2 | WEIGHT: 266 LBS

## 2020-01-23 DIAGNOSIS — J01.90 ACUTE NON-RECURRENT SINUSITIS, UNSPECIFIED LOCATION: Primary | ICD-10-CM

## 2020-01-23 PROCEDURE — 99214 OFFICE O/P EST MOD 30 MIN: CPT | Performed by: INTERNAL MEDICINE

## 2020-01-23 PROCEDURE — 99212 OFFICE O/P EST SF 10 MIN: CPT | Performed by: INTERNAL MEDICINE

## 2020-01-23 RX ORDER — DOXYCYCLINE 100 MG/1
100 TABLET ORAL 2 TIMES DAILY
Qty: 20 TABLET | Refills: 0 | Status: SHIPPED | OUTPATIENT
Start: 2020-01-23 | End: 2020-09-19 | Stop reason: ALTCHOICE

## 2020-01-23 NOTE — TELEPHONE ENCOUNTER
Action Requested: Summary for Provider     []  Critical Lab, Recommendations Needed  [] Need Additional Advice  []   FYI    []   Need Orders  [] Need Medications Sent to Pharmacy  []  Other     SUMMARY:   The patient only wants to see Dr. Kristal Larsen and

## 2020-01-23 NOTE — TELEPHONE ENCOUNTER
Patient called in with symptoms below:    -Sinus infection    Patient called in stating that she wants to see Dr. Kandis Vincent today. States that he will squeeze her in and all she needs is an antibiotic. CSS transferred to triage.

## 2020-01-23 NOTE — PROGRESS NOTES
HPI:    Patient ID: Tony Cho is a 62year old female. Sinusitis   This is a new problem. The current episode started 1 to 4 weeks ago (2 weeks). The problem is unchanged. There has been no fever. Her pain is at a severity of 5/10.  The pain is SPRAYS IN EACH NOSTRIL DAILY 1 Inhaler 2   • Beclomethasone Dipropionate (QVAR) 40 MCG/ACT Inhalation Aero Soln Inhale 2 puffs (0.08 mg total) into the lungs 2 (two) times daily.  1 Inhaler 0     Allergies:  Ciprofloxacin           HIVES  Cefprozil daily.       Imaging & Referrals:  None       BP#[id number]

## 2020-01-23 NOTE — TELEPHONE ENCOUNTER
Advised patient of Dr. Lacy Michaels note. Patient verbalized understanding. Appointment made for today at 2pm with Dr Tyler Phillips in [place].

## 2020-02-03 NOTE — TELEPHONE ENCOUNTER
.rxpass  Refill Protocol Appointment Criteria  · Appointment scheduled in the past 12 months or in the next 3 months  Recent Outpatient Visits            1 week ago Acute non-recurrent sinusitis, unspecified location    [facility], [facility], [facility]

## 2020-03-12 ENCOUNTER — OFFICE VISIT (OUTPATIENT)
Dept: INTERNAL MEDICINE CLINIC | Facility: CLINIC | Age: 65
End: 2020-03-12
Payer: COMMERCIAL

## 2020-03-12 VITALS
OXYGEN SATURATION: 96 % | SYSTOLIC BLOOD PRESSURE: 124 MMHG | HEART RATE: 69 BPM | BODY MASS INDEX: 50.03 KG/M2 | TEMPERATURE: 98 F | HEIGHT: 61 IN | DIASTOLIC BLOOD PRESSURE: 80 MMHG | WEIGHT: 265 LBS

## 2020-03-12 DIAGNOSIS — R05.9 COUGH: Primary | ICD-10-CM

## 2020-03-12 PROBLEM — R07.9 CHEST PAIN: Status: RESOLVED | Noted: 2018-08-18 | Resolved: 2020-03-12

## 2020-03-12 PROCEDURE — 99214 OFFICE O/P EST MOD 30 MIN: CPT | Performed by: INTERNAL MEDICINE

## 2020-03-12 PROCEDURE — 99212 OFFICE O/P EST SF 10 MIN: CPT | Performed by: INTERNAL MEDICINE

## 2020-03-12 RX ORDER — AMOXICILLIN 875 MG/1
875 TABLET, COATED ORAL 2 TIMES DAILY
Qty: 20 TABLET | Refills: 0 | Status: SHIPPED | OUTPATIENT
Start: 2020-03-12 | End: 2020-09-19 | Stop reason: ALTCHOICE

## 2020-03-12 NOTE — PROGRESS NOTES
HPI:    Patient ID: Bird Emerson is a 60year old female. Cough   This is a new problem. The current episode started in the past 7 days (6 days ago). The problem has been gradually worsening (started initially as hoarseness).  The problem occurs ev puffs into the lungs every 4 (four) hours as needed for Wheezing. 1 Inhaler 0   • Homeopathic Products (IRRITATED EYE RELIEF OP) Apply to eye.      • Fluticasone Propionate 50 MCG/ACT Nasal Suspension SHAKE LIQUID AND USE 2 SPRAYS IN EACH NOSTRIL DAILY 1 In persist/worsens. No orders of the defined types were placed in this encounter.       Meds This Visit:  Requested Prescriptions      No prescriptions requested or ordered in this encounter       Imaging & Referrals:  None       #[phone]

## 2020-09-05 ENCOUNTER — TELEPHONE (OUTPATIENT)
Dept: INTERNAL MEDICINE CLINIC | Facility: CLINIC | Age: 65
End: 2020-09-05

## 2020-09-05 RX ORDER — BUDESONIDE AND FORMOTEROL FUMARATE DIHYDRATE 160; 4.5 UG/1; UG/1
2 AEROSOL RESPIRATORY (INHALATION) 2 TIMES DAILY
Qty: 3 INHALER | Refills: 1 | Status: SHIPPED | OUTPATIENT
Start: 2020-09-05 | End: 2021-10-18

## 2020-09-19 ENCOUNTER — NURSE TRIAGE (OUTPATIENT)
Dept: INTERNAL MEDICINE CLINIC | Facility: CLINIC | Age: 65
End: 2020-09-19

## 2020-09-19 RX ORDER — SULFAMETHOXAZOLE AND TRIMETHOPRIM 800; 160 MG/1; MG/1
1 TABLET ORAL 2 TIMES DAILY
Qty: 14 TABLET | Refills: 0 | Status: SHIPPED | OUTPATIENT
Start: 2020-09-19 | End: 2020-09-28

## 2020-09-19 NOTE — TELEPHONE ENCOUNTER
Called and talked to pt; declines ER visit. Symptoms as described below with also urnary frequency and urgency. Will start bactrim DS bid for one week. Pt told to go to ER if worsens. Pt agreed.

## 2020-09-19 NOTE — TELEPHONE ENCOUNTER
Please reply to pool: EM RN TRIAGE    Action Requested: Summary for Provider     []  Critical Lab, Recommendations Needed  [x] Need Additional Advice  []   FYI    [x]   Need Orders  [x] Need Medications Sent to Pharmacy  []  Other     SUMMARY: Strongly a

## 2020-09-28 ENCOUNTER — TELEPHONE (OUTPATIENT)
Dept: INTERNAL MEDICINE CLINIC | Facility: CLINIC | Age: 65
End: 2020-09-28

## 2020-09-28 RX ORDER — SULFAMETHOXAZOLE AND TRIMETHOPRIM 800; 160 MG/1; MG/1
1 TABLET ORAL 2 TIMES DAILY
Qty: 11 TABLET | Refills: 0 | Status: SHIPPED | OUTPATIENT
Start: 2020-09-28 | End: 2020-10-05

## 2020-09-28 NOTE — TELEPHONE ENCOUNTER
Patient reports started on antibiotic for 7 days. Accidentally dropped medication in , requesting order for 11 tabs to complete full course of antibiotic, please advise.     Sulfamethoxazole-TMP -160 MG Oral Tab per tablet 14 tablet 0 9/19/2

## 2021-02-06 DIAGNOSIS — Z23 NEED FOR VACCINATION: ICD-10-CM

## 2021-02-26 ENCOUNTER — TELEPHONE (OUTPATIENT)
Dept: INTERNAL MEDICINE CLINIC | Facility: CLINIC | Age: 66
End: 2021-02-26

## 2021-03-05 ENCOUNTER — IMMUNIZATION (OUTPATIENT)
Dept: LAB | Facility: HOSPITAL | Age: 66
End: 2021-03-05
Attending: HOSPITALIST
Payer: MEDICARE

## 2021-03-05 DIAGNOSIS — Z23 NEED FOR VACCINATION: Primary | ICD-10-CM

## 2021-03-05 PROCEDURE — 0011A SARSCOV2 VAC 100MCG/0.5ML IM: CPT

## 2021-04-02 ENCOUNTER — IMMUNIZATION (OUTPATIENT)
Dept: LAB | Facility: HOSPITAL | Age: 66
End: 2021-04-02
Attending: EMERGENCY MEDICINE
Payer: MEDICARE

## 2021-04-02 DIAGNOSIS — Z23 NEED FOR VACCINATION: Primary | ICD-10-CM

## 2021-04-02 PROCEDURE — 0012A SARSCOV2 VAC 100MCG/0.5ML IM: CPT

## 2021-04-29 ENCOUNTER — OFFICE VISIT (OUTPATIENT)
Dept: INTERNAL MEDICINE CLINIC | Facility: CLINIC | Age: 66
End: 2021-04-29
Payer: MEDICARE

## 2021-04-29 ENCOUNTER — LAB ENCOUNTER (OUTPATIENT)
Dept: LAB | Age: 66
End: 2021-04-29
Attending: INTERNAL MEDICINE
Payer: MEDICARE

## 2021-04-29 VITALS
HEIGHT: 61 IN | SYSTOLIC BLOOD PRESSURE: 126 MMHG | TEMPERATURE: 97 F | DIASTOLIC BLOOD PRESSURE: 80 MMHG | BODY MASS INDEX: 51.54 KG/M2 | HEART RATE: 76 BPM | WEIGHT: 273 LBS

## 2021-04-29 DIAGNOSIS — E53.8 VITAMIN B12 DEFICIENCY: ICD-10-CM

## 2021-04-29 DIAGNOSIS — E55.9 VITAMIN D DEFICIENCY: ICD-10-CM

## 2021-04-29 DIAGNOSIS — Z12.31 ENCOUNTER FOR SCREENING MAMMOGRAM FOR BREAST CANCER: ICD-10-CM

## 2021-04-29 DIAGNOSIS — K92.1 HEMATOCHEZIA: ICD-10-CM

## 2021-04-29 DIAGNOSIS — Z00.00 MEDICARE ANNUAL WELLNESS VISIT, INITIAL: Primary | ICD-10-CM

## 2021-04-29 DIAGNOSIS — E78.2 MIXED HYPERLIPIDEMIA: ICD-10-CM

## 2021-04-29 DIAGNOSIS — Z00.00 MEDICARE ANNUAL WELLNESS VISIT, INITIAL: ICD-10-CM

## 2021-04-29 DIAGNOSIS — Z78.0 MENOPAUSE: ICD-10-CM

## 2021-04-29 DIAGNOSIS — R73.03 PREDIABETES: ICD-10-CM

## 2021-04-29 DIAGNOSIS — E03.8 SUBCLINICAL HYPOTHYROIDISM: ICD-10-CM

## 2021-04-29 PROCEDURE — 80061 LIPID PANEL: CPT

## 2021-04-29 PROCEDURE — 84481 FREE ASSAY (FT-3): CPT

## 2021-04-29 PROCEDURE — 82306 VITAMIN D 25 HYDROXY: CPT

## 2021-04-29 PROCEDURE — 83036 HEMOGLOBIN GLYCOSYLATED A1C: CPT

## 2021-04-29 PROCEDURE — 84439 ASSAY OF FREE THYROXINE: CPT

## 2021-04-29 PROCEDURE — 36415 COLL VENOUS BLD VENIPUNCTURE: CPT

## 2021-04-29 PROCEDURE — 80053 COMPREHEN METABOLIC PANEL: CPT

## 2021-04-29 PROCEDURE — 82607 VITAMIN B-12: CPT | Performed by: INTERNAL MEDICINE

## 2021-04-29 PROCEDURE — 85025 COMPLETE CBC W/AUTO DIFF WBC: CPT

## 2021-04-29 PROCEDURE — 84443 ASSAY THYROID STIM HORMONE: CPT

## 2021-04-29 PROCEDURE — G0402 INITIAL PREVENTIVE EXAM: HCPCS | Performed by: INTERNAL MEDICINE

## 2021-04-29 PROCEDURE — 81001 URINALYSIS AUTO W/SCOPE: CPT

## 2021-04-29 NOTE — PROGRESS NOTES
HPI:   Rinku Florez is a 73year old female who presents for a Medicare Initial Preventative Physical Exam (Welcome to Medicare- < 12 months on Medicare).     Her last annual assessment has been over 1 year: Annual Physical due on 10/17/2020         F functional status.    Difficulty walking?: Yes       Depression Screening (PHQ-2/PHQ-9): Over the LAST 2 WEEKS   Little interest or pleasure in doing things: Not at all  Feeling down, depressed, or hopeless: Not at all  PHQ-2 SCORE: 0      Advanced Directiv 33 04/29/2021    CA 9.4 04/29/2021    ALB 3.5 04/29/2021    TSH 6.690 (H) 04/29/2021    CREATSERUM 0.72 04/29/2021     (H) 04/29/2021        CBC  (most recent labs)   Lab Results   Component Value Date    WBC 8.6 04/29/2021    HGB 13.8 04/29/2021 HISTORY:   She  reports that she quit smoking about 25 years ago. Her smoking use included cigarettes. She has a 30.00 pack-year smoking history. She has never used smokeless tobacco. She reports current alcohol use.  She reports that she does not use drugs understanding the speech of women and children: No I have trouble understanding the speaker in a large room such as at a meeting or place of Buddhism: Sometimes   Many people I talk to seem to mumble (or don't speak clearly): No People get annoyed because I 04/02/2021   • FLU VAC QIV SPLIT 3 YRS AND OLDER (12354) 10/01/2018   • Influenza 02/21/2011, 11/03/2011, 10/05/2012, 11/04/2016, 11/05/2018        ASSESSMENT AND OTHER RELEVANT CHRONIC CONDITIONS:   Elif De Guzman is a 73year old female who presents Pt had complained of intermittent blood on stools, states  She does have hemorrhoids so I told her she needs to have colonoscopy. Pt agreed and referred to gastro.        Diet assessment: good     PLAN:  The patient indicates understanding of these issues 66 No flowsheet data found. Bone Density Screening      Dexascan Every two years No results found for this or any previous visit. No flowsheet data found.     Pap and Pelvic      Pap: Every 3 yrs age 21-68 or Pap+HPV every 5 yrs age 33-67, age 72 and o

## 2021-05-02 ENCOUNTER — TELEPHONE (OUTPATIENT)
Dept: INTERNAL MEDICINE CLINIC | Facility: CLINIC | Age: 66
End: 2021-05-02

## 2021-05-02 PROBLEM — M47.816 LUMBAR SPONDYLOSIS: Status: RESOLVED | Noted: 2017-09-28 | Resolved: 2021-05-02

## 2021-05-02 PROBLEM — J01.90 ACUTE SINUSITIS: Status: RESOLVED | Noted: 2018-11-19 | Resolved: 2021-05-02

## 2021-05-02 PROBLEM — E55.9 VITAMIN D DEFICIENCY: Status: ACTIVE | Noted: 2021-05-02

## 2021-05-02 PROBLEM — K92.1 HEMATOCHEZIA: Status: ACTIVE | Noted: 2021-05-02

## 2021-05-02 PROBLEM — Z78.0 MENOPAUSE: Status: ACTIVE | Noted: 2021-05-02

## 2021-05-02 RX ORDER — ERGOCALCIFEROL (VITAMIN D2) 1250 MCG
50000 CAPSULE ORAL WEEKLY
Qty: 12 CAPSULE | Refills: 0 | Status: SHIPPED | OUTPATIENT
Start: 2021-05-02 | End: 2021-07-19

## 2021-05-24 RX ORDER — POTASSIUM CHLORIDE 750 MG/1
10 TABLET, FILM COATED, EXTENDED RELEASE ORAL
Qty: 30 TABLET | Refills: 0 | Status: SHIPPED | OUTPATIENT
Start: 2021-05-24

## 2021-05-24 RX ORDER — FUROSEMIDE 20 MG/1
20 TABLET ORAL
Qty: 30 TABLET | Refills: 0 | Status: SHIPPED | OUTPATIENT
Start: 2021-05-24

## 2021-05-24 NOTE — TELEPHONE ENCOUNTER
Calling for refill request.  LOV 4-  States she takes Furosemide 20mg PRN as well as Potassium PRN. Has been taking her husbands medication. Rarely takes this medicine. Reports she is having swelling to both ankles. Medication pended below.

## 2021-06-17 ENCOUNTER — OFFICE VISIT (OUTPATIENT)
Dept: ORTHOPEDICS CLINIC | Facility: CLINIC | Age: 66
End: 2021-06-17
Payer: MEDICARE

## 2021-06-17 ENCOUNTER — HOSPITAL ENCOUNTER (OUTPATIENT)
Dept: GENERAL RADIOLOGY | Facility: HOSPITAL | Age: 66
Discharge: HOME | End: 2021-06-17
Attending: ORTHOPAEDIC SURGERY
Payer: MEDICARE

## 2021-06-17 DIAGNOSIS — M54.50 LOW BACK PAIN, UNSPECIFIED BACK PAIN LATERALITY, UNSPECIFIED CHRONICITY, UNSPECIFIED WHETHER SCIATICA PRESENT: ICD-10-CM

## 2021-06-17 DIAGNOSIS — M51.36 LUMBAR DEGENERATIVE DISC DISEASE: Primary | ICD-10-CM

## 2021-06-17 DIAGNOSIS — M25.562 LEFT KNEE PAIN, UNSPECIFIED CHRONICITY: ICD-10-CM

## 2021-06-17 PROCEDURE — 99204 OFFICE O/P NEW MOD 45 MIN: CPT | Performed by: ORTHOPAEDIC SURGERY

## 2021-06-17 PROCEDURE — 73564 X-RAY EXAM KNEE 4 OR MORE: CPT | Performed by: ORTHOPAEDIC SURGERY

## 2021-06-17 PROCEDURE — 72100 X-RAY EXAM L-S SPINE 2/3 VWS: CPT | Performed by: ORTHOPAEDIC SURGERY

## 2021-06-17 NOTE — PROGRESS NOTES
NURSING INTAKE COMMENTS: Patient presents with:  Consult: pt has back pain 20+yrs. no injuries. over the years it has been getting worst. family history of spine tumors. pt had MRI and xrays done previously 3yrs ago.        HPI: This 73year old female pres ER 10 MEQ Oral Tab CR Take 1 tablet (10 mEq total) by mouth daily as needed. 30 tablet 0   • ergocalciferol (DRISDOL) 1.25 MG (44626 UT) Oral Cap Take 1 capsule (50,000 Units total) by mouth once a week for 12 doses.  12 capsule 0   • Budesonide-Formoterol 1996        Years since quittin.4      Smokeless tobacco: Never Used    Vaping Use      Vaping Use: Never used    Substance and Sexual Activity      Alcohol use:  Yes        Alcohol/week: 0.0 standard drinks        Comment: socially      Drug use: of the hips. Neurological: Light touch and pinprick sensation intact throughout the lower extremities. Ankle dorsiflexion plantarflexion EHL knee extension and hip flexion strength are 5 out of 5 bilaterally. No clonus. Imaging:   No results found. fail to improve.     Akiko Castillo MD

## 2021-07-01 ENCOUNTER — OFFICE VISIT (OUTPATIENT)
Dept: ORTHOPEDICS CLINIC | Facility: CLINIC | Age: 66
End: 2021-07-01
Payer: MEDICARE

## 2021-07-01 VITALS
DIASTOLIC BLOOD PRESSURE: 99 MMHG | SYSTOLIC BLOOD PRESSURE: 174 MMHG | HEIGHT: 62 IN | BODY MASS INDEX: 49.75 KG/M2 | WEIGHT: 270.38 LBS | HEART RATE: 85 BPM

## 2021-07-01 DIAGNOSIS — M17.12 PRIMARY OSTEOARTHRITIS OF LEFT KNEE: Primary | ICD-10-CM

## 2021-07-01 PROCEDURE — 99213 OFFICE O/P EST LOW 20 MIN: CPT | Performed by: ORTHOPAEDIC SURGERY

## 2021-07-01 PROCEDURE — 20610 DRAIN/INJ JOINT/BURSA W/O US: CPT | Performed by: PHYSICIAN ASSISTANT

## 2021-07-01 RX ORDER — TRIAMCINOLONE ACETONIDE 40 MG/ML
40 INJECTION, SUSPENSION INTRA-ARTICULAR; INTRAMUSCULAR ONCE
Status: COMPLETED | OUTPATIENT
Start: 2021-07-01 | End: 2021-07-01

## 2021-07-01 NOTE — PROGRESS NOTES
Per verbal order from Dr. Giorgio Goins, PA, draw up 3ml of 0.5% Marcaine & 2ml 1% lidocaine and 1ml of Kenalog 40 for cortisone injection to left knee Sharla Renee LPN    Patient provided education handout for cortisone injection.    Patient left offi

## 2021-07-01 NOTE — PROGRESS NOTES
NURSING INTAKE COMMENTS: Patient presents with:  Knee Pain: pt had an injury ot the left knee at age 36 playing softball, 3/10 left knee pain today with occasional swelling, pt has difficulty going up and down stairs, pt denies previous surgery, XR 6/17/21 ER 10 MEQ Oral Tab CR Take 1 tablet (10 mEq total) by mouth daily as needed. 30 tablet 0   • ergocalciferol (DRISDOL) 1.25 MG (84106 UT) Oral Cap Take 1 capsule (50,000 Units total) by mouth once a week for 12 doses.  12 capsule 0   • Budesonide-Formoterol 1996        Years since quittin.5      Smokeless tobacco: Never Used    Vaping Use      Vaping Use: Never used    Substance and Sexual Activity      Alcohol use:  Yes        Alcohol/week: 0.0 standard drinks        Comment: socially      Drug use: stable to varus and valgus stress at 30 degrees and full extension. Lachman sign and posterior drawer negative. No pain with passive range of motion of the hip. Neurological: Light touch and pinprick sensation intact throughout the lower extremities.   A MD on 6/17/2021 at 12:56 PM             Labs:  Lab Results   Component Value Date    WBC 8.6 04/29/2021    HGB 13.8 04/29/2021    .0 04/29/2021      Lab Results   Component Value Date     (H) 04/29/2021    BUN 14 04/29/2021    CREATSERUM 0.72

## 2021-08-02 ENCOUNTER — HOSPITAL ENCOUNTER (OUTPATIENT)
Dept: MAMMOGRAPHY | Age: 66
Discharge: HOME | End: 2021-08-02
Attending: INTERNAL MEDICINE
Payer: MEDICARE

## 2021-08-02 DIAGNOSIS — Z12.31 ENCOUNTER FOR SCREENING MAMMOGRAM FOR BREAST CANCER: ICD-10-CM

## 2021-08-02 PROCEDURE — 77067 SCR MAMMO BI INCL CAD: CPT | Performed by: INTERNAL MEDICINE

## 2021-08-02 PROCEDURE — 77063 BREAST TOMOSYNTHESIS BI: CPT | Performed by: INTERNAL MEDICINE

## 2021-08-04 ENCOUNTER — HOSPITAL ENCOUNTER (OUTPATIENT)
Dept: BONE DENSITY | Age: 66
Discharge: HOME | End: 2021-08-04
Attending: INTERNAL MEDICINE
Payer: MEDICARE

## 2021-08-04 DIAGNOSIS — Z78.0 MENOPAUSE: ICD-10-CM

## 2021-08-04 PROCEDURE — 77080 DXA BONE DENSITY AXIAL: CPT | Performed by: INTERNAL MEDICINE

## 2021-08-17 ENCOUNTER — OFFICE VISIT (OUTPATIENT)
Dept: PHYSICAL THERAPY | Age: 66
End: 2021-08-17
Attending: ORTHOPAEDIC SURGERY
Payer: MEDICARE

## 2021-08-17 DIAGNOSIS — M17.12 PRIMARY OSTEOARTHRITIS OF LEFT KNEE: ICD-10-CM

## 2021-08-17 PROCEDURE — 97161 PT EVAL LOW COMPLEX 20 MIN: CPT

## 2021-08-17 PROCEDURE — 97110 THERAPEUTIC EXERCISES: CPT

## 2021-08-17 NOTE — PROGRESS NOTES
P.TAl EVALUATION:   Referring Physician: Dr. Matthew Vneegas  Diagnosis: Primary osteoarthritis of left knee (M17.12)     Date of Onset: 1987 Date of Service: 8/17/2021     PATIENT Anh Sandoval is a 73year old y/o female who presents to therapy t 5/5, L 4+/5  Ankle pf: B 5/5    Gait: pt ambulates independently without an assistive device; notable increased LLE ER, decreased B hip flexion, and B push off    Stairs: pt negotiates stairs with reciprocal pattern with BUE support independently and safel

## 2021-08-18 ENCOUNTER — TELEMEDICINE (OUTPATIENT)
Dept: INTERNAL MEDICINE CLINIC | Facility: CLINIC | Age: 66
End: 2021-08-18
Payer: MEDICARE

## 2021-08-18 DIAGNOSIS — J01.90 ACUTE RHINOSINUSITIS: Primary | ICD-10-CM

## 2021-08-18 PROCEDURE — 99441 PHONE E/M BY PHYS 5-10 MIN: CPT | Performed by: NURSE PRACTITIONER

## 2021-08-18 RX ORDER — DOXYCYCLINE HYCLATE 100 MG
100 TABLET ORAL 2 TIMES DAILY
Qty: 14 TABLET | Refills: 0 | Status: SHIPPED | OUTPATIENT
Start: 2021-08-18 | End: 2021-08-25

## 2021-08-18 NOTE — PROGRESS NOTES
Virtual Check-In    Kina Rudolph verbally consents to a Virtual Check-In visit on 08/18/21. Patient understands and accepts financial responsibility for any deductible, co-insurance and/or co-pays associated with this service.     Justin Altamirano is Inhaler, Rfl: 1  •  Syringe/Needle, Disp, (BD LUER-MARGARITO SYRINGE) 25G X 1\" 3 ML Does not apply Misc, USE TWICE A MONTH AS DIRECTED, Disp: 24 each, Rfl: 2  •  CYANOCOBALAMIN 1000 MCG/ML Injection Solution, INJECT 1 ML INTO THE MUSCLE EVERY 15 DAYS, Disp: 24 relief.        Review of Systems:    General: Denies fatigue, chills/fever, night sweats, weight loss, loss of appetite  Neurological: + frequent headaches  Cardiovascular: Denies leg swelling, chest pain or pressure after eating or when upset, irregular he

## 2021-08-19 ENCOUNTER — OFFICE VISIT (OUTPATIENT)
Dept: PHYSICAL THERAPY | Age: 66
End: 2021-08-19
Attending: ORTHOPAEDIC SURGERY
Payer: MEDICARE

## 2021-08-19 PROCEDURE — 97110 THERAPEUTIC EXERCISES: CPT

## 2021-08-19 NOTE — PROGRESS NOTES
Diagnosis: Primary osteoarthritis of left knee (M17.12)            Next MD visit: none scheduled  Fall Risk: standard         Precautions: n/a          Medication Changes since last visit?: No    Subjective: Pt reports minimal knee pain currently, but stat

## 2021-08-24 ENCOUNTER — OFFICE VISIT (OUTPATIENT)
Dept: PHYSICAL THERAPY | Age: 66
End: 2021-08-24
Attending: ORTHOPAEDIC SURGERY
Payer: MEDICARE

## 2021-08-24 PROCEDURE — 97110 THERAPEUTIC EXERCISES: CPT

## 2021-08-24 NOTE — PROGRESS NOTES
Diagnosis: Primary osteoarthritis of left knee (M17.12)            Next MD visit: none scheduled  Fall Risk: standard         Precautions: n/a          Medication Changes since last visit?: No    Subjective: Pt reports her back and knee have still been bot

## 2021-08-26 ENCOUNTER — OFFICE VISIT (OUTPATIENT)
Dept: PHYSICAL THERAPY | Age: 66
End: 2021-08-26
Attending: ORTHOPAEDIC SURGERY
Payer: MEDICARE

## 2021-08-26 PROCEDURE — 97110 THERAPEUTIC EXERCISES: CPT

## 2021-08-26 NOTE — PROGRESS NOTES
Diagnosis: Primary osteoarthritis of left knee (M17.12)            Next MD visit: none scheduled  Fall Risk: standard         Precautions: n/a          Medication Changes since last visit?: No    Subjective: Pt reports her knee is doing about the same.  Pt min

## 2021-08-31 ENCOUNTER — OFFICE VISIT (OUTPATIENT)
Dept: PHYSICAL THERAPY | Age: 66
End: 2021-08-31
Attending: ORTHOPAEDIC SURGERY
Payer: MEDICARE

## 2021-08-31 PROCEDURE — 97110 THERAPEUTIC EXERCISES: CPT

## 2021-08-31 NOTE — PROGRESS NOTES
Diagnosis: Primary osteoarthritis of left knee (M17.12)            Next MD visit: none scheduled  Fall Risk: standard         Precautions: n/a          Medication Changes since last visit?: No    Subjective: Patient reports her knee was bothering her on [place] to sinus drainage issues so exercises modified as needed.     Plan: continue PT    Charges: Ex 2       Total Timed Treatment: 30 min  Total Treatment Time: 30 min

## 2021-09-01 ENCOUNTER — MED REC SCAN ONLY (OUTPATIENT)
Dept: INTERNAL MEDICINE CLINIC | Facility: CLINIC | Age: 66
End: 2021-09-01

## 2021-09-02 ENCOUNTER — OFFICE VISIT (OUTPATIENT)
Dept: PHYSICAL THERAPY | Age: 66
End: 2021-09-02
Attending: ORTHOPAEDIC SURGERY
Payer: MEDICARE

## 2021-09-02 PROCEDURE — 97110 THERAPEUTIC EXERCISES: CPT

## 2021-09-02 NOTE — PROGRESS NOTES
Diagnosis: Primary osteoarthritis of left knee (M17.12)            Next MD visit: none scheduled  Fall Risk: standard         Precautions: n/a          Medication Changes since last visit?: No    Subjective: Patient reports continued medial knee region jeana min

## 2021-09-07 ENCOUNTER — APPOINTMENT (OUTPATIENT)
Dept: PHYSICAL THERAPY | Age: 66
End: 2021-09-07
Attending: ORTHOPAEDIC SURGERY
Payer: MEDICARE

## 2021-09-09 ENCOUNTER — APPOINTMENT (OUTPATIENT)
Dept: PHYSICAL THERAPY | Age: 66
End: 2021-09-09
Attending: ORTHOPAEDIC SURGERY
Payer: MEDICARE

## 2021-09-10 ENCOUNTER — TELEPHONE (OUTPATIENT)
Dept: INTERNAL MEDICINE CLINIC | Facility: CLINIC | Age: 66
End: 2021-09-10

## 2021-09-10 DIAGNOSIS — E55.9 VITAMIN D DEFICIENCY: ICD-10-CM

## 2021-09-10 DIAGNOSIS — R73.03 PREDIABETES: Primary | ICD-10-CM

## 2021-09-10 DIAGNOSIS — E53.8 VITAMIN B12 DEFICIENCY: ICD-10-CM

## 2021-09-10 DIAGNOSIS — E78.2 MIXED HYPERLIPIDEMIA: ICD-10-CM

## 2021-09-10 DIAGNOSIS — E03.8 SUBCLINICAL HYPOTHYROIDISM: ICD-10-CM

## 2021-09-10 DIAGNOSIS — K92.1 HEMATOCHEZIA: ICD-10-CM

## 2021-09-10 NOTE — TELEPHONE ENCOUNTER
Dr. Leilani Dejesus: patient asked if you can order any routine labs she needs prior to coming into office. Patient called.  She has appt 9/22/21 for a f/u and to discuss consult she had with Dr Claudia Kumar ([place])  She has hx of macular degeneration; retinal

## 2021-09-13 NOTE — TELEPHONE ENCOUNTER
From   Fran Burden To   Andie Cruz Sent and Delivered   9/10/2021 11:40 AM   Last Read in [address]   9/10/2021  4:37 PM by Eran Senior

## 2021-09-14 ENCOUNTER — LAB ENCOUNTER (OUTPATIENT)
Dept: LAB | Age: 66
End: 2021-09-14
Attending: INTERNAL MEDICINE
Payer: MEDICARE

## 2021-09-14 DIAGNOSIS — E78.2 MIXED HYPERLIPIDEMIA: ICD-10-CM

## 2021-09-14 DIAGNOSIS — K92.1 HEMATOCHEZIA: ICD-10-CM

## 2021-09-14 DIAGNOSIS — E03.8 SUBCLINICAL HYPOTHYROIDISM: ICD-10-CM

## 2021-09-14 DIAGNOSIS — R73.03 PREDIABETES: ICD-10-CM

## 2021-09-14 DIAGNOSIS — E55.9 VITAMIN D DEFICIENCY: ICD-10-CM

## 2021-09-14 LAB
ALBUMIN SERPL-MCNC: 3.6 G/DL (ref 3.4–5)
ALBUMIN/GLOB SERPL: 0.9 {RATIO} (ref 1–2)
ALP LIVER SERPL-CCNC: 94 U/L
ALT SERPL-CCNC: 39 U/L
ANION GAP SERPL CALC-SCNC: 9 MMOL/L (ref 0–18)
AST SERPL-CCNC: 24 U/L (ref 15–37)
BASOPHILS # BLD AUTO: 0.04 X10(3) UL (ref 0–0.2)
BASOPHILS NFR BLD AUTO: 0.5 %
BILIRUB SERPL-MCNC: 0.5 MG/DL (ref 0.1–2)
BUN BLD-MCNC: 12 MG/DL (ref 7–18)
BUN/CREAT SERPL: 16.2 (ref 10–20)
CALCIUM BLD-MCNC: 8.9 MG/DL (ref 8.5–10.1)
CHLORIDE SERPL-SCNC: 105 MMOL/L (ref 98–112)
CHOLEST SERPL-MCNC: 255 MG/DL (ref ?–200)
CO2 SERPL-SCNC: 26 MMOL/L (ref 21–32)
CREAT BLD-MCNC: 0.74 MG/DL
DEPRECATED RDW RBC AUTO: 46.8 FL (ref 35.1–46.3)
EOSINOPHIL # BLD AUTO: 0.17 X10(3) UL (ref 0–0.7)
EOSINOPHIL NFR BLD AUTO: 2 %
ERYTHROCYTE [DISTWIDTH] IN BLOOD BY AUTOMATED COUNT: 14.5 % (ref 11–15)
EST. AVERAGE GLUCOSE BLD GHB EST-MCNC: 134 MG/DL (ref 68–126)
GLOBULIN PLAS-MCNC: 3.8 G/DL (ref 2.8–4.4)
GLUCOSE BLD-MCNC: 112 MG/DL (ref 70–99)
HBA1C MFR BLD HPLC: 6.3 % (ref ?–5.7)
HCT VFR BLD AUTO: 44.9 %
HDLC SERPL-MCNC: 42 MG/DL (ref 40–59)
HGB BLD-MCNC: 14.1 G/DL
IMM GRANULOCYTES # BLD AUTO: 0.03 X10(3) UL (ref 0–1)
IMM GRANULOCYTES NFR BLD: 0.3 %
LDLC SERPL CALC-MCNC: 166 MG/DL (ref ?–100)
LYMPHOCYTES # BLD AUTO: 2.24 X10(3) UL (ref 1–4)
LYMPHOCYTES NFR BLD AUTO: 26 %
MCH RBC QN AUTO: 28.1 PG (ref 26–34)
MCHC RBC AUTO-ENTMCNC: 31.4 G/DL (ref 31–37)
MCV RBC AUTO: 89.6 FL
MONOCYTES # BLD AUTO: 0.39 X10(3) UL (ref 0.1–1)
MONOCYTES NFR BLD AUTO: 4.5 %
NEUTROPHILS # BLD AUTO: 5.76 X10 (3) UL (ref 1.5–7.7)
NEUTROPHILS # BLD AUTO: 5.76 X10(3) UL (ref 1.5–7.7)
NEUTROPHILS NFR BLD AUTO: 66.7 %
NONHDLC SERPL-MCNC: 213 MG/DL (ref ?–130)
OSMOLALITY SERPL CALC.SUM OF ELEC: 291 MOSM/KG (ref 275–295)
PATIENT FASTING Y/N/NP: YES
PATIENT FASTING Y/N/NP: YES
PLATELET # BLD AUTO: 364 10(3)UL (ref 150–450)
POTASSIUM SERPL-SCNC: 4 MMOL/L (ref 3.5–5.1)
PROT SERPL-MCNC: 7.4 G/DL (ref 6.4–8.2)
RBC # BLD AUTO: 5.01 X10(6)UL
SODIUM SERPL-SCNC: 140 MMOL/L (ref 136–145)
T3FREE SERPL-MCNC: 2.53 PG/ML (ref 2.4–4.2)
T4 FREE SERPL-MCNC: 1 NG/DL (ref 0.8–1.7)
TRIGL SERPL-MCNC: 249 MG/DL (ref 30–149)
TSI SER-ACNC: 8.54 MIU/ML (ref 0.36–3.74)
VIT D+METAB SERPL-MCNC: 12 NG/ML (ref 30–100)
VLDLC SERPL CALC-MCNC: 50 MG/DL (ref 0–30)
WBC # BLD AUTO: 8.6 X10(3) UL (ref 4–11)

## 2021-09-14 PROCEDURE — 36415 COLL VENOUS BLD VENIPUNCTURE: CPT

## 2021-09-14 PROCEDURE — 83036 HEMOGLOBIN GLYCOSYLATED A1C: CPT

## 2021-09-14 PROCEDURE — 84481 FREE ASSAY (FT-3): CPT

## 2021-09-14 PROCEDURE — 80053 COMPREHEN METABOLIC PANEL: CPT

## 2021-09-14 PROCEDURE — 84439 ASSAY OF FREE THYROXINE: CPT

## 2021-09-14 PROCEDURE — 85025 COMPLETE CBC W/AUTO DIFF WBC: CPT

## 2021-09-14 PROCEDURE — 82306 VITAMIN D 25 HYDROXY: CPT

## 2021-09-14 PROCEDURE — 80061 LIPID PANEL: CPT

## 2021-09-14 PROCEDURE — 84443 ASSAY THYROID STIM HORMONE: CPT

## 2021-09-22 ENCOUNTER — OFFICE VISIT (OUTPATIENT)
Dept: INTERNAL MEDICINE CLINIC | Facility: CLINIC | Age: 66
End: 2021-09-22
Payer: MEDICARE

## 2021-09-22 VITALS
DIASTOLIC BLOOD PRESSURE: 75 MMHG | SYSTOLIC BLOOD PRESSURE: 143 MMHG | TEMPERATURE: 99 F | BODY MASS INDEX: 51.34 KG/M2 | HEART RATE: 80 BPM | HEIGHT: 62 IN | WEIGHT: 279 LBS

## 2021-09-22 DIAGNOSIS — I10 HYPERTENSION, ESSENTIAL: Primary | ICD-10-CM

## 2021-09-22 DIAGNOSIS — H35.60 RETINAL HEMORRHAGE, UNSPECIFIED LATERALITY: ICD-10-CM

## 2021-09-22 DIAGNOSIS — R73.03 PREDIABETES: ICD-10-CM

## 2021-09-22 DIAGNOSIS — E03.8 SUBCLINICAL HYPOTHYROIDISM: ICD-10-CM

## 2021-09-22 DIAGNOSIS — E55.9 VITAMIN D DEFICIENCY: ICD-10-CM

## 2021-09-22 PROCEDURE — 90732 PPSV23 VACC 2 YRS+ SUBQ/IM: CPT | Performed by: INTERNAL MEDICINE

## 2021-09-22 PROCEDURE — G0009 ADMIN PNEUMOCOCCAL VACCINE: HCPCS | Performed by: INTERNAL MEDICINE

## 2021-09-22 PROCEDURE — 99214 OFFICE O/P EST MOD 30 MIN: CPT | Performed by: INTERNAL MEDICINE

## 2021-09-22 RX ORDER — ERGOCALCIFEROL 1.25 MG/1
50000 CAPSULE ORAL
Qty: 12 CAPSULE | Refills: 0 | Status: SHIPPED | OUTPATIENT
Start: 2021-09-22 | End: 2022-02-24

## 2021-09-22 RX ORDER — LISINOPRIL 5 MG/1
5 TABLET ORAL DAILY
Qty: 90 TABLET | Refills: 0 | Status: SHIPPED | OUTPATIENT
Start: 2021-09-22 | End: 2021-11-08

## 2021-09-23 NOTE — PROGRESS NOTES
Subjective:     Patient ID: Anny Navarro is a 73year old female.     Patient presents today regarding new finding of isolated retinal hemorrhage noted on her recent ffup with her retinal specialist. She was then told to ffup with her PCP and evaluate OP) Apply to eye.      • Fluticasone Propionate 50 MCG/ACT Nasal Suspension SHAKE LIQUID AND USE 2 SPRAYS IN EACH NOSTRIL DAILY 1 Inhaler 2   • Beclomethasone Dipropionate (QVAR) 40 MCG/ACT Inhalation Aero Soln Inhale 2 puffs (0.08 mg total) into the lungs (Other) Other         No Family h   • Other (Other) Other         No Family h   • Diabetes Sister    • Obesity Sister       Social History: Social History    Tobacco Use      Smoking status: Former Smoker        Packs/day: 1.00        Years: 30.00        P laterality  Plan: had been dx by sourav and was told to ffup with us to evaluate for possible causes.  bp had been elevated so will be trated for htn.     (R73.03) Prediabetes  Plan: a1c at 6.3 ; she will be seeing bariatric clinic and may get started on GLP

## 2021-09-24 ENCOUNTER — OFFICE VISIT (OUTPATIENT)
Dept: ORTHOPEDICS CLINIC | Facility: CLINIC | Age: 66
End: 2021-09-24
Payer: MEDICARE

## 2021-09-24 ENCOUNTER — OFFICE VISIT (OUTPATIENT)
Dept: SURGERY | Facility: CLINIC | Age: 66
End: 2021-09-24
Payer: MEDICARE

## 2021-09-24 VITALS
HEIGHT: 60.5 IN | WEIGHT: 277.88 LBS | BODY MASS INDEX: 53.15 KG/M2 | DIASTOLIC BLOOD PRESSURE: 83 MMHG | OXYGEN SATURATION: 94 % | SYSTOLIC BLOOD PRESSURE: 149 MMHG | HEART RATE: 74 BPM

## 2021-09-24 VITALS — HEIGHT: 60.5 IN | BODY MASS INDEX: 52.98 KG/M2 | WEIGHT: 277 LBS

## 2021-09-24 DIAGNOSIS — M25.562 LEFT KNEE PAIN, UNSPECIFIED CHRONICITY: ICD-10-CM

## 2021-09-24 DIAGNOSIS — E78.5 DYSLIPIDEMIA: ICD-10-CM

## 2021-09-24 DIAGNOSIS — R63.5 WEIGHT GAIN: ICD-10-CM

## 2021-09-24 DIAGNOSIS — I10 BENIGN ESSENTIAL HTN: Primary | ICD-10-CM

## 2021-09-24 DIAGNOSIS — E55.9 VITAMIN D DEFICIENCY: ICD-10-CM

## 2021-09-24 DIAGNOSIS — E66.01 MORBID OBESITY WITH BMI OF 50.0-59.9, ADULT (HCC): ICD-10-CM

## 2021-09-24 DIAGNOSIS — M17.12 PRIMARY OSTEOARTHRITIS OF LEFT KNEE: Primary | ICD-10-CM

## 2021-09-24 DIAGNOSIS — E88.81 INSULIN RESISTANCE: ICD-10-CM

## 2021-09-24 PROBLEM — E88.819 INSULIN RESISTANCE: Status: ACTIVE | Noted: 2021-09-24

## 2021-09-24 PROCEDURE — 99204 OFFICE O/P NEW MOD 45 MIN: CPT | Performed by: INTERNAL MEDICINE

## 2021-09-24 PROCEDURE — 99213 OFFICE O/P EST LOW 20 MIN: CPT | Performed by: PHYSICIAN ASSISTANT

## 2021-09-24 NOTE — PROGRESS NOTES
[facility] Consultation Note       Patient:  Kyle Vasques  :      1955  MRN:      [id number]    Referring Provider: Dr. Sonido Villela       Chief Complaint:  Patient presents with:  Consult: Non-surgical weight management con every 14 (fourteen) days for 12 doses. 12 capsule 0   • lisinopril 5 MG Oral Tab Take 1 tablet (5 mg total) by mouth daily. 90 tablet 0   • furosemide 20 MG Oral Tab Take 1 tablet (20 mg total) by mouth daily as needed.  30 tablet 0   • Potassium Chloride E since quittin.7      Smokeless tobacco: Never Used    Vaping Use      Vaping Use: Never used    Substance and Sexual Activity      Alcohol use:  Yes        Alcohol/week: 0.0 standard drinks        Comment: socially      Drug use: No      Sexual activit Housing in the Last Year: Not on file      Number of Places Lived in the Last Year: Not on file      Unstable Housing in the Last Year: Not on file  Surgical History:    Past Surgical History:   Procedure Laterality Date   • ABLATION     •   1993 on exertion  Cardiovascular: negative  Gastrointestinal: positive for reflux symptoms  Musculoskeletal:positive for arthralgias  Neurological: negative  Behavioral/Psych: negative  Endocrine: negative    Physical Exam:  General appearance: alert, appears s Encourage upper body exercises if unable to perform weight-bearing exercises. Goals for next month:  1. Keep a food log. 2. Drink 48-64 ounces of non-caloric beverages per day. No fruit juices or regular soda.   3. Increase activity-upper body exercis

## 2021-09-24 NOTE — PROGRESS NOTES
NURSING INTAKE COMMENTS: Patient presents with:  Knee Pain: pt in for left knee pain, completed PT with no improvements, pain when walking, pain at a 7/10 today      HPI: This 73year old female presents today with complaints of left knee follow-up.   She i (10 mEq total) by mouth daily as needed. 30 tablet 0   • Budesonide-Formoterol Fumarate (SYMBICORT) 160-4.5 MCG/ACT Inhalation Aerosol Inhale 2 puffs into the lungs 2 (two) times daily.  3 Inhaler 1   • CYANOCOBALAMIN 1000 MCG/ML Injection Solution INJECT 1 Alcohol/week: 0.0 standard drinks        Comment: socially      Drug use: No      Sexual activity: Not on file       Review of Systems:  GENERAL: denies fevers, chills, night sweats, fatigue, unintentional weight loss/gain  SKIN: denies skin lesions, open Component Value Date     (H) 09/14/2021    BUN 12 09/14/2021    CREATSERUM 0.74 09/14/2021    GFRNAA 85 09/14/2021    GFRAA 98 09/14/2021        Assessment and Plan:  Diagnoses and all orders for this visit:    Primary osteoarthritis of left knee

## 2021-10-04 ENCOUNTER — TELEPHONE (OUTPATIENT)
Dept: ORTHOPEDICS CLINIC | Facility: CLINIC | Age: 66
End: 2021-10-04

## 2021-10-04 ENCOUNTER — TELEPHONE (OUTPATIENT)
Dept: INTERNAL MEDICINE CLINIC | Facility: CLINIC | Age: 66
End: 2021-10-04

## 2021-10-04 NOTE — TELEPHONE ENCOUNTER
----- Message from MeyersdaleAl Miranda sent at 10/4/2021 10:25 AM CDT -----  Regarding: Lisinopril medication  Ephraim 10/3, experienced difficulties with dizziness & headache. Unable to function the entire day. Stopped taking  lisinopril.   Headache is mild

## 2021-10-06 ENCOUNTER — TELEPHONE (OUTPATIENT)
Dept: INTERNAL MEDICINE CLINIC | Facility: CLINIC | Age: 66
End: 2021-10-06

## 2021-10-06 NOTE — TELEPHONE ENCOUNTER
Please triage patient, kali message sent.    ----- Message from Rodneyjuarez Miranda sent at 10/6/2021 12:17 PM CDT -----  Regarding: Lisinopril medication  Temp this morning 101.5  Only able to consume small amounts of food & liquids.   Yesterday 1 Apple

## 2021-10-06 NOTE — TELEPHONE ENCOUNTER
Left message (per EJ) for pt to call back tomorrow am and advising based on her symptoms she should be seen at the  or ER tonight. Advised to f/u with office tomorrow 8am-430pm and speak to a RN. Devan also sent with same info.      Sergio Ma,

## 2021-10-06 NOTE — TELEPHONE ENCOUNTER
S/w pt and informed her she had medicare and BCBS supp and she can schedule MRI no PA needed. She wanted to know about coverage and advised she call her insurance as well as billing to find out her out of pocket.

## 2021-10-07 ENCOUNTER — APPOINTMENT (OUTPATIENT)
Dept: PHYSICAL THERAPY | Age: 66
End: 2021-10-07
Attending: ORTHOPAEDIC SURGERY
Payer: MEDICARE

## 2021-10-07 ENCOUNTER — APPOINTMENT (OUTPATIENT)
Dept: CT IMAGING | Facility: HOSPITAL | Age: 66
DRG: 690 | End: 2021-10-07
Attending: EMERGENCY MEDICINE
Payer: MEDICARE

## 2021-10-07 ENCOUNTER — APPOINTMENT (OUTPATIENT)
Dept: GENERAL RADIOLOGY | Facility: HOSPITAL | Age: 66
DRG: 690 | End: 2021-10-07
Attending: EMERGENCY MEDICINE
Payer: MEDICARE

## 2021-10-07 ENCOUNTER — HOSPITAL ENCOUNTER (INPATIENT)
Facility: HOSPITAL | Age: 66
LOS: 3 days | Discharge: HOME | DRG: 690 | End: 2021-10-11
Attending: EMERGENCY MEDICINE | Admitting: HOSPITALIST
Payer: MEDICARE

## 2021-10-07 DIAGNOSIS — N12 PYELONEPHRITIS: Primary | ICD-10-CM

## 2021-10-07 PROCEDURE — 71045 X-RAY EXAM CHEST 1 VIEW: CPT | Performed by: EMERGENCY MEDICINE

## 2021-10-07 PROCEDURE — 74176 CT ABD & PELVIS W/O CONTRAST: CPT | Performed by: EMERGENCY MEDICINE

## 2021-10-07 RX ORDER — KETOROLAC TROMETHAMINE 15 MG/ML
15 INJECTION, SOLUTION INTRAMUSCULAR; INTRAVENOUS ONCE
Status: COMPLETED | OUTPATIENT
Start: 2021-10-07 | End: 2021-10-07

## 2021-10-07 RX ORDER — ONDANSETRON 2 MG/ML
4 INJECTION INTRAMUSCULAR; INTRAVENOUS ONCE
Status: COMPLETED | OUTPATIENT
Start: 2021-10-07 | End: 2021-10-07

## 2021-10-07 NOTE — TELEPHONE ENCOUNTER
Patient states her temperature this morning is 101.0 with T-Max yesterday of 103.0. Feeling somewhat better today. Did not take blood pressure, but has a pulse ox - 92%; p 75. Can only walk 10 steps at a time due to dizziness.   Continues to have nause

## 2021-10-07 NOTE — TELEPHONE ENCOUNTER
I called pt; she still runnding fevers 101 today, stil having nausea, back pain, not able to eat or drink much. No urinary symptoms though. No covid exposure. Fully vaccinated for covid.   Denies any cough/colds/anosmia/loss of taste  I advised her to go to

## 2021-10-08 PROBLEM — N12 PYELONEPHRITIS: Status: ACTIVE | Noted: 2021-10-08

## 2021-10-08 PROCEDURE — 99222 1ST HOSP IP/OBS MODERATE 55: CPT | Performed by: HOSPITALIST

## 2021-10-08 RX ORDER — ACETAMINOPHEN 325 MG/1
650 TABLET ORAL EVERY 6 HOURS PRN
Status: DISCONTINUED | OUTPATIENT
Start: 2021-10-08 | End: 2021-10-11

## 2021-10-08 RX ORDER — SODIUM CHLORIDE 9 MG/ML
INJECTION, SOLUTION INTRAVENOUS CONTINUOUS
Status: DISCONTINUED | OUTPATIENT
Start: 2021-10-08 | End: 2021-10-10

## 2021-10-08 RX ORDER — ONDANSETRON 2 MG/ML
4 INJECTION INTRAMUSCULAR; INTRAVENOUS EVERY 6 HOURS PRN
Status: DISCONTINUED | OUTPATIENT
Start: 2021-10-08 | End: 2021-10-11

## 2021-10-08 RX ORDER — ERGOCALCIFEROL 1.25 MG/1
50000 CAPSULE ORAL
Status: DISCONTINUED | OUTPATIENT
Start: 2021-10-08 | End: 2021-10-11

## 2021-10-08 RX ORDER — HYDROCODONE BITARTRATE AND ACETAMINOPHEN 5; 325 MG/1; MG/1
1 TABLET ORAL EVERY 6 HOURS PRN
Status: DISCONTINUED | OUTPATIENT
Start: 2021-10-08 | End: 2021-10-11

## 2021-10-08 RX ORDER — KETOROLAC TROMETHAMINE 15 MG/ML
15 INJECTION, SOLUTION INTRAMUSCULAR; INTRAVENOUS EVERY 6 HOURS PRN
Status: DISPENSED | OUTPATIENT
Start: 2021-10-08 | End: 2021-10-10

## 2021-10-08 RX ORDER — HYDRALAZINE HYDROCHLORIDE 20 MG/ML
10 INJECTION INTRAMUSCULAR; INTRAVENOUS EVERY 4 HOURS PRN
Status: DISCONTINUED | OUTPATIENT
Start: 2021-10-08 | End: 2021-10-11

## 2021-10-08 RX ORDER — HEPARIN SODIUM 5000 [USP'U]/ML
5000 INJECTION, SOLUTION INTRAVENOUS; SUBCUTANEOUS EVERY 12 HOURS SCHEDULED
Status: DISCONTINUED | OUTPATIENT
Start: 2021-10-08 | End: 2021-10-11

## 2021-10-08 RX ORDER — PANTOPRAZOLE SODIUM 40 MG/1
40 TABLET, DELAYED RELEASE ORAL
Status: DISCONTINUED | OUTPATIENT
Start: 2021-10-08 | End: 2021-10-11

## 2021-10-08 RX ORDER — ZOLPIDEM TARTRATE 5 MG/1
5 TABLET ORAL NIGHTLY PRN
Status: DISCONTINUED | OUTPATIENT
Start: 2021-10-08 | End: 2021-10-11

## 2021-10-08 RX ORDER — DEXTROSE MONOHYDRATE 25 G/50ML
50 INJECTION, SOLUTION INTRAVENOUS
Status: DISCONTINUED | OUTPATIENT
Start: 2021-10-08 | End: 2021-10-11

## 2021-10-08 RX ORDER — FLUTICASONE PROPIONATE 50 MCG
1 SPRAY, SUSPENSION (ML) NASAL DAILY
Status: DISCONTINUED | OUTPATIENT
Start: 2021-10-08 | End: 2021-10-11

## 2021-10-08 NOTE — PROGRESS NOTES
[facility] Pharmacy Note: Antimicrobial Weight Based Dose Adjustment for: meropenem (MERREM)    Chelsy Rivera is a 73year old patient who has been prescribed meropenem (MERREM) 500 mg every 8 hours.     Estimated Creatinine Clearance: 38.4 mL/min (A) (based

## 2021-10-08 NOTE — ED INITIAL ASSESSMENT (HPI)
Pt c/o headache since Sunday, bilateral flank pain and fevers since Monday. Pt reports nausea and diarrhea x3 today.

## 2021-10-08 NOTE — ED QUICK NOTES
Orders for admission, patient is aware of plan and ready to go upstairs. Any questions please call ED SOHAIL Singh at extension [phone].      Type of COVID test sent: negative  COVID Suspicion level: LOW    Titratable drugs infusing:  Rate: N/A    LOC at time of

## 2021-10-08 NOTE — PLAN OF CARE
Problem: Patient Centered Care  Goal: Patient preferences are identified and integrated in the patient's plan of care  Description: Interventions:  - What would you like us to know as we care for you?  Not on diabetic medications at home   - Provide timel nutritional consult as needed  - Establish a toileting routine/schedule  - Consider collaborating with pharmacy to review patient's medication profile  Outcome: Not Progressing     Problem: GENITOURINARY - ADULT  Goal: Absence of urinary retention  Brenda Hamlin

## 2021-10-08 NOTE — ED PROVIDER NOTES
Patient Seen in: [facility] AND [facility] Emergency Department      History   Patient presents with:  Headache  Dizziness    Stated Complaint: Headaches, backpain, fever    Subjective:   HPI    71-year-old female presents for evaluation of fever.   Patient repor HPI.  Constitutional and vital signs reviewed. All other systems reviewed and negative except as noted above.     Physical Exam     ED Triage Vitals [10/07/21 1955]   /78   Pulse 93   Resp 19   Temp 99 °F (37.2 °C)   Temp src Oral   SpO2 92 %   O components within normal limits   URINALYSIS WITH CULTURE REFLEX - Abnormal; Notable for the following components:    Clarity Urine Cloudy (*)     Ketones Urine Trace (*)     Blood Urine Moderate (*)     Protein Urine 30  (*)     Leukocyte Esterase Urine L cystitis. Asymmetric left perinephric stranding is nonspecific but can be seen with ascending urinary tract infection. No evidence of appendicitis. status post cholecystectomy. No biliary ductal dilatation. No bowel obstruction.     Multilevel dege

## 2021-10-08 NOTE — H&P
[address] Gemma Graham Patient Status:  Emergency    1955 MRN [id number]   Location [address] Attending Sandi Inman MD   [facility] Day # 0 PCP Radha Gutierrez MD Disorder Mother         Congestive Heart Failure   • Renal Disease Mother    • Other (Other) Mother    • Melanoma Father    • Cancer Father         Basal Cell Cancer   • Other (Other) Other         No Family h   • Other (Other) Other         No Family h MG (36709 UT) Oral Cap   No No   Sig: Take 1 capsule (50,000 Units total) by mouth every 14 (fourteen) days for 12 doses. furosemide 20 MG Oral Tab   No No   Sig: Take 1 tablet (20 mg total) by mouth daily as needed.    lisinopril 5 MG Oral Tab   No No Component Value Date    WBC 10.2 10/07/2021    HGB 14.2 10/07/2021    HCT 43.9 10/07/2021    .0 10/07/2021    CREATSERUM 1.05 10/07/2021    BUN 14 10/07/2021     10/07/2021    K 3.5 10/07/2021    CL 98 10/07/2021    CO2 27.0 10/07/2021    GL

## 2021-10-08 NOTE — PLAN OF CARE
Abraham Rodas is alert, talkative. Has complaints of headache, sensitivity to light/sound, and generalized pain. IV Toradol for pain, some relief. IV antibiotic and IV fluid infusing. Waiting to collect stool sample to rule out CDIFF.  Poor appetite, encouraging RN  Outcome: Progressing  10/8/2021 1058 by Bella Ordoñez RN  Outcome: Progressing     Problem: GASTROINTESTINAL - ADULT  Goal: Minimal or absence of nausea and vomiting  Description: INTERVENTIONS:  - Maintain adequate hydration with IV or PO as ordered of falls.   - Lamont fall precautions as indicated by assessment.  - Educate pt/family on patient safety including physical limitations  - Instruct pt to call for assistance with activity based on assessment  - Modify environment to reduce risk of injury

## 2021-10-09 PROCEDURE — 99233 SBSQ HOSP IP/OBS HIGH 50: CPT | Performed by: HOSPITALIST

## 2021-10-09 NOTE — PROGRESS NOTES
[facility]    Progress Note    Deejay Dyer Patient Status:  Inpatient    1955 MRN [id number]   Location [facility] 5SW/SE Attending Pete Bonilla MD   Hosp Day # 1 PCP Christen Angeles MD     Subjective:   Zac Gibbs 10/08/2021    ALB 3.2 (L) 10/07/2021    ALKPHO 101 10/07/2021    BILT 0.5 10/07/2021    TP 7.7 10/07/2021    AST 33 10/07/2021    ALT 34 10/07/2021    T4F 1.0 09/14/2021    TSH 8.540 (H) 09/14/2021    LIP 65 (L) 10/07/2021    B12 489 04/29/2021       CT AB growing E.coli, sensitivities pending  - continue IV meropenem  - await finalization of cultures  - contiue IVF, IV antiemetics, and IV analgesics.   - Ct abdomen and pelvis reviewed     COPD  - currently respiratory status is stable  - will continue to mon

## 2021-10-09 NOTE — PLAN OF CARE
Problem: Patient Centered Care  Goal: Patient preferences are identified and integrated in the patient's plan of care  Description: Interventions:  - What would you like us to know as we care for you?  Not on diabetic medications at home   - Provide timel nonpharmacologic comfort measures as appropriate  - Advance diet as tolerated, if ordered  - Obtain nutritional consult as needed  - Evaluate fluid balance  Outcome: Progressing  Goal: Maintains or returns to baseline bowel function  Description: INTERVENT

## 2021-10-10 PROCEDURE — 99233 SBSQ HOSP IP/OBS HIGH 50: CPT | Performed by: HOSPITALIST

## 2021-10-10 RX ORDER — CEFAZOLIN SODIUM/WATER 2 G/20 ML
2 SYRINGE (ML) INTRAVENOUS EVERY 8 HOURS
Status: DISCONTINUED | OUTPATIENT
Start: 2021-10-10 | End: 2021-10-11

## 2021-10-10 RX ORDER — DIPHENHYDRAMINE HYDROCHLORIDE 50 MG/ML
25 INJECTION INTRAMUSCULAR; INTRAVENOUS EVERY 4 HOURS PRN
Status: DISCONTINUED | OUTPATIENT
Start: 2021-10-10 | End: 2021-10-11

## 2021-10-10 NOTE — PROGRESS NOTES
[facility] Pharmacy Note: Antimicrobial Weight Based Dose Adjustment for: cefazolin (ANCEF)    Theo Cary is a 73year old patient who has been prescribed cefazolin (ANCEF) 1000 mg every 8 hours.     Estimated Creatinine Clearance: 58.4 mL/min (based on SC

## 2021-10-10 NOTE — PLAN OF CARE
Problem: Patient Centered Care  Goal: Patient preferences are identified and integrated in the patient's plan of care  Description: Interventions:  - What would you like us to know as we care for you?  Not on diabetic medications at home   - Provide timel Progressing  10/10/2021 0220 by Ines Reyes RN  Outcome: Progressing     Problem: GASTROINTESTINAL - ADULT  Goal: Minimal or absence of nausea and vomiting  Description: INTERVENTIONS:  - Maintain adequate hydration with IV or PO as ordered and tolerated Roy fall precautions as indicated by assessment.  - Educate pt/family on patient safety including physical limitations  - Instruct pt to call for assistance with activity based on assessment  - Modify environment to reduce risk of injury  - Provide a

## 2021-10-10 NOTE — PLAN OF CARE
Willa Higgins is A/O x4. Vital signs are stable on RA. IVF discontinued. Pain being managed with PO norco. Up independently. On general diet, accucheck ACHS. Voids freely. Heparin for VTE prophylaxis, pt refuses medication.  Pt started on Ancef this afternoon, to ADULT  Goal: Minimal or absence of nausea and vomiting  Description: INTERVENTIONS:  - Maintain adequate hydration with IV or PO as ordered and tolerated  - Nasogastric tube to low intermittent suction as ordered  - Evaluate effectiveness of ordered antiem Progressing

## 2021-10-10 NOTE — PROGRESS NOTES
[facility]  Hospitalist Progress Note     Elif De Guzman Patient Status:  Inpatient    1955  73year old [facility] [id number]   Location 538/538-A Attending Kelsea Masters MD   Hosp Day # 2 PCP Ronny Mckenzie MD     Assessment & ainsley.  Calm, [facility]    Labs:  Recent Labs   Lab 10/08/21  0547 10/09/21  0941 10/10/21  0605   RBC 4.66 4.36 4.09   HGB 13.1 12.1 11.6*   HCT 40.8 38.7 36.4   MCV 87.6 88.8 89.0   MCH 28.1 27.8 28.4   MCHC 32.1 31.3 31.9   RDW 13.9 13.4 13.5   NEPREL

## 2021-10-10 NOTE — PLAN OF CARE
Patient is alert and oriented x4. She is tolerating general diet, exhibiting a good appetite. IV fluids infusing for additional hydration. Patient continues to receive IV antibiotics (Meropenum) while awaiting results of urine culture.   She is ambulatin Short Term Goal: resolve infection    Interventions:   - antibiotics  - hydration  - proper cameron care   - See additional Care Plan goals for specific interventions  Outcome: Progressing     Problem: GASTROINTESTINAL - ADULT  Goal: Minimal or absence of lynette based on assessment  - Modify environment to reduce risk of injury  - Provide assistive devices as appropriate  - Consider OT/PT consult to assist with strengthening/mobility  - Encourage toileting schedule  Outcome: Progressing

## 2021-10-11 VITALS
DIASTOLIC BLOOD PRESSURE: 74 MMHG | HEIGHT: 60 IN | BODY MASS INDEX: 52.59 KG/M2 | HEART RATE: 72 BPM | RESPIRATION RATE: 18 BRPM | TEMPERATURE: 98 F | OXYGEN SATURATION: 94 % | SYSTOLIC BLOOD PRESSURE: 154 MMHG | WEIGHT: 267.88 LBS

## 2021-10-11 PROCEDURE — 99239 HOSP IP/OBS DSCHRG MGMT >30: CPT | Performed by: HOSPITALIST

## 2021-10-11 RX ORDER — CEPHALEXIN 750 MG/1
750 CAPSULE ORAL 4 TIMES DAILY
Qty: 36 CAPSULE | Refills: 0 | Status: SHIPPED | OUTPATIENT
Start: 2021-10-11 | End: 2021-10-20

## 2021-10-11 NOTE — PROGRESS NOTES
Discharge RN Summary: Patient has discharge order in. Patient to discharge home with family. IV to be removed by Kelly Mcintyre once abx completed. Understands to follow up with PCP in 1 week.  Patient understands to  meds- faxed per pt request to pt senia

## 2021-10-11 NOTE — DISCHARGE SUMMARY
[facility] HOSPITALIST  DISCHARGE SUMMARY     Ellen Marino Patient Status:  Inpatient    1955 MRN [id number]   Location [facility] 5SW/SE Attending Neville Shaw MD   Hosp Day # 3 PCP Gregorio Whitaker MD     DATE OF ADMISSION: 10/7/ will follow up with her primary care physician in about a week, if still doing well at that time her cephalosporin allergy should be removed from her chart.     Patient understands return to the emergency room for increased pain, fever, discharge, shortness propionate 50 MCG/ACT Susp  Commonly known as: FLONASE  What changed:   · how much to take  · how to take this  · when to take this  · reasons to take this      SHAKE LIQUID AND USE 2 SPRAYS IN EACH NOSTRIL DAILY   Quantity: 1 Inhaler  Refills: 2        CO specified. The above plan and follow-up instructions were reviewed with the patient and they verbalized understanding and agreement. They understand to return to the emergency room for any concerning signs or symptoms.   Greater than 30 minutes spent on d

## 2021-10-11 NOTE — PLAN OF CARE
Pt received last dose of antibiotic. PRN pain meds given for back pain.      Problem: Patient Centered Care  Goal: Patient preferences are identified and integrated in the patient's plan of care  Description: Interventions:  - What would you like us to know effectiveness of ordered antiemetic medications  - Provide nonpharmacologic comfort measures as appropriate  - Advance diet as tolerated, if ordered  - Obtain nutritional consult as needed  - Evaluate fluid balance  Outcome: Completed  Goal: Maintains or r

## 2021-10-11 NOTE — CM/SW NOTE
BPCI-Advanced Medicare Program Note:  Plan of care reviewed for care coordination and discharge planning. Noted patient falls under  BPCI/Medicare program, with  for UTI. MICHELLE tool was used to help determine next care setting.  Thus, [address] recommend

## 2021-10-12 ENCOUNTER — APPOINTMENT (OUTPATIENT)
Dept: PHYSICAL THERAPY | Age: 66
End: 2021-10-12
Attending: ORTHOPAEDIC SURGERY
Payer: MEDICARE

## 2021-10-12 ENCOUNTER — TELEPHONE (OUTPATIENT)
Dept: INTERNAL MEDICINE CLINIC | Facility: CLINIC | Age: 66
End: 2021-10-12

## 2021-10-12 ENCOUNTER — PATIENT OUTREACH (OUTPATIENT)
Dept: CASE MANAGEMENT | Age: 66
End: 2021-10-12

## 2021-10-12 DIAGNOSIS — Z02.9 ENCOUNTERS FOR UNSPECIFIED ADMINISTRATIVE PURPOSE: ICD-10-CM

## 2021-10-12 DIAGNOSIS — I10 BENIGN ESSENTIAL HTN: Primary | ICD-10-CM

## 2021-10-12 DIAGNOSIS — R19.7 DIARRHEA OF PRESUMED INFECTIOUS ORIGIN: ICD-10-CM

## 2021-10-12 DIAGNOSIS — R73.03 PREDIABETES: ICD-10-CM

## 2021-10-12 PROCEDURE — 1111F DSCHRG MED/CURRENT MED MERGE: CPT

## 2021-10-12 NOTE — PROGRESS NOTES
Initial Post Discharge Follow Up   Discharge Date: 10/11/21  Contact Date: 10/12/2021    Consent Verification:  Assessment Completed With: Patient  HIPAA Verified?   Yes    Discharge Dx:     Acute pyelonephritis.  Due to E. coli  Hisotry of cephalosporin hospital was your diagnoses explained to you? Yes  • Do you have any questions about your diagnoses?  No  • Are you able to perform normal daily activities of living as you have prior to your hospital stay (dressing, bathing, ambulating to the bathroom, etc (Patient taking differently: 2 sprays by Nasal route daily as needed.  SHAKE LIQUID AND USE 2 SPRAYS IN EACH NOSTRIL DAILY) 1 Inhaler 2   • Beclomethasone Dipropionate (QVAR) 40 MCG/ACT Inhalation Aero Soln Inhale 2 puffs (0.08 mg total) into the lungs 2 (t Risk       Difficulty of Paying Living Expenses: Not hard at all      DX: specifics:  COPD:  Have you participated in a pulmonary rehab program?   no   Would you like more information?   no  We reviewed your medications/inhalers, how often are you using you Please also bring your Insurance card, Photo ID, and your medication bottles or a list of your current medication. If your condition improves and this appointment is no longer needed, please contact your physician office to cancel.     Please verify with importance of a close follow up with providers. A TCM-HFU appointment was scheduled. The patient verbalized understanding and will contact the office with any further questions or concerns.        CCM referral placed:  Yes    BOOK BY DATE: 10/25/2021    [x

## 2021-10-12 NOTE — TELEPHONE ENCOUNTER
KIP spoke to the patient today for TCM. The patient is scheduled for a TCM-HFU appointment on 10/18/2021. The patient reports plans to hold lisinopril 5mg until seen in the office on 10/18/2021.  KIP did review MD recommendations to resume this medicati

## 2021-10-14 ENCOUNTER — APPOINTMENT (OUTPATIENT)
Dept: PHYSICAL THERAPY | Age: 66
End: 2021-10-14
Attending: ORTHOPAEDIC SURGERY
Payer: MEDICARE

## 2021-10-18 ENCOUNTER — OFFICE VISIT (OUTPATIENT)
Dept: INTERNAL MEDICINE CLINIC | Facility: CLINIC | Age: 66
End: 2021-10-18
Payer: MEDICARE

## 2021-10-18 VITALS
BODY MASS INDEX: 52.61 KG/M2 | WEIGHT: 268 LBS | SYSTOLIC BLOOD PRESSURE: 156 MMHG | DIASTOLIC BLOOD PRESSURE: 78 MMHG | HEIGHT: 60 IN

## 2021-10-18 DIAGNOSIS — E03.8 SUBCLINICAL HYPOTHYROIDISM: ICD-10-CM

## 2021-10-18 DIAGNOSIS — R73.03 PREDIABETES: ICD-10-CM

## 2021-10-18 DIAGNOSIS — R19.7 DIARRHEA OF PRESUMED INFECTIOUS ORIGIN: ICD-10-CM

## 2021-10-18 DIAGNOSIS — I10 HYPERTENSION, ESSENTIAL: ICD-10-CM

## 2021-10-18 DIAGNOSIS — N10 ACUTE PYELONEPHRITIS: Primary | ICD-10-CM

## 2021-10-18 DIAGNOSIS — E78.2 MIXED HYPERLIPIDEMIA: ICD-10-CM

## 2021-10-18 PROCEDURE — 90662 IIV NO PRSV INCREASED AG IM: CPT | Performed by: INTERNAL MEDICINE

## 2021-10-18 PROCEDURE — G0008 ADMIN INFLUENZA VIRUS VAC: HCPCS | Performed by: INTERNAL MEDICINE

## 2021-10-18 PROCEDURE — 1111F DSCHRG MED/CURRENT MED MERGE: CPT | Performed by: INTERNAL MEDICINE

## 2021-10-18 PROCEDURE — 99495 TRANSJ CARE MGMT MOD F2F 14D: CPT | Performed by: INTERNAL MEDICINE

## 2021-10-19 ENCOUNTER — APPOINTMENT (OUTPATIENT)
Dept: PHYSICAL THERAPY | Age: 66
End: 2021-10-19
Attending: ORTHOPAEDIC SURGERY
Payer: MEDICARE

## 2021-10-19 ENCOUNTER — HOSPITAL ENCOUNTER (OUTPATIENT)
Dept: MRI IMAGING | Facility: HOSPITAL | Age: 66
Discharge: HOME | End: 2021-10-19
Attending: PHYSICIAN ASSISTANT
Payer: MEDICARE

## 2021-10-19 DIAGNOSIS — M25.562 LEFT KNEE PAIN, UNSPECIFIED CHRONICITY: ICD-10-CM

## 2021-10-19 PROCEDURE — 73721 MRI JNT OF LWR EXTRE W/O DYE: CPT | Performed by: PHYSICIAN ASSISTANT

## 2021-10-19 NOTE — PROGRESS NOTES
HPI:    Kedar Monroe is a 73year old female here today for hospital follow up.    She was discharged from Inpatient hospital, [facility] AND [facility]  to Home   Admission Date: 10/7/21   Discharge Date: 10/11/21  Hospital Discharge Diagnoses (since 9/18/20 mouth daily. furosemide 20 MG Oral Tab, Take 1 tablet (20 mg total) by mouth daily as needed. Potassium Chloride ER 10 MEQ Oral Tab CR, Take 1 tablet (10 mEq total) by mouth daily as needed.   CYANOCOBALAMIN 1000 MCG/ML Injection Solution, INJECT 1 ML INT lesions  EYES: denies blurred vision or double vision  HEENT: denies nasal congestion, sinus pain or ST  LUNGS: denies shortness of breath with exertion  CARDIOVASCULAR: denies chest pain on exertion or palpitations  GI: denies abdominal pain, denies heart 5mg po bid and email me bp readings in a week.      (R73.03) Prediabetes  Plan: stable; continue to ff dm diet and work on losing weight.     (E78.2) Mixed hyperlipidemia  Plan: ff low fat low cholesterol diet.     (Z68.43) Adult BMI 50.0-59.9 kg/sq m ([facility] [address]

## 2021-10-20 ENCOUNTER — LAB ENCOUNTER (OUTPATIENT)
Dept: LAB | Age: 66
End: 2021-10-20
Attending: INTERNAL MEDICINE
Payer: MEDICARE

## 2021-10-20 PROCEDURE — 87493 C DIFF AMPLIFIED PROBE: CPT | Performed by: INTERNAL MEDICINE

## 2021-10-21 ENCOUNTER — APPOINTMENT (OUTPATIENT)
Dept: PHYSICAL THERAPY | Age: 66
End: 2021-10-21
Attending: ORTHOPAEDIC SURGERY
Payer: MEDICARE

## 2021-10-21 ENCOUNTER — OFFICE VISIT (OUTPATIENT)
Dept: SURGERY | Facility: CLINIC | Age: 66
End: 2021-10-21
Payer: MEDICARE

## 2021-10-21 VITALS
DIASTOLIC BLOOD PRESSURE: 82 MMHG | OXYGEN SATURATION: 96 % | WEIGHT: 267.63 LBS | HEART RATE: 69 BPM | SYSTOLIC BLOOD PRESSURE: 136 MMHG | BODY MASS INDEX: 51.18 KG/M2 | HEIGHT: 60.5 IN

## 2021-10-21 DIAGNOSIS — E78.5 DYSLIPIDEMIA: ICD-10-CM

## 2021-10-21 DIAGNOSIS — E88.81 INSULIN RESISTANCE: ICD-10-CM

## 2021-10-21 DIAGNOSIS — E66.01 MORBID OBESITY WITH BMI OF 50.0-59.9, ADULT (HCC): ICD-10-CM

## 2021-10-21 DIAGNOSIS — I10 BENIGN ESSENTIAL HTN: Primary | ICD-10-CM

## 2021-10-21 PROCEDURE — 99214 OFFICE O/P EST MOD 30 MIN: CPT | Performed by: INTERNAL MEDICINE

## 2021-10-21 NOTE — PROGRESS NOTES
[address]  Dept: [phone]       Patient:  Ellen Marino  :      1955  MRN:      [id number]    Chief Complaint:  Patient pr daily. (Patient taking differently: Take 10 mg by mouth 2 (two) times a day.) 90 tablet 0   • furosemide 20 MG Oral Tab Take 1 tablet (20 mg total) by mouth daily as needed.  30 tablet 0   • Potassium Chloride ER 10 MEQ Oral Tab CR Take 1 tablet (10 mEq tot Asked        Back Care: Not Asked        Exercise: No        Bike Helmet: Not Asked        Seat Belt: Not Asked        Self-Exams: Not Asked    Social History Narrative      Not on file    Social Determinants of Health  Financial Resource Strain: Low Risk • Renal Disease Mother    • Other (Other) Mother    • Melanoma Father    • Cancer Father         Basal Cell Cancer   • Other (Other) Other         No Family h   • Other (Other) Other         No Family h   • Diabetes Sister    • Obesity Sister        Food auscultation bilaterally  Heart: S1, S2 normal, no murmur, click, rub or gallop, regular rate and rhythm  Abdomen: soft, obese, non tender  Extremities: extremities normal, atraumatic, no cyanosis or edema  Pulses: 2+ and symmetric  Neurologic: Grossly nor this visit:    Benign essential HTN    Dyslipidemia    Insulin resistance    Morbid obesity with BMI of 50.0-59.9, adult (HCC)          Izzy Yu MD

## 2021-10-26 ENCOUNTER — PATIENT MESSAGE (OUTPATIENT)
Dept: INTERNAL MEDICINE CLINIC | Facility: CLINIC | Age: 66
End: 2021-10-26

## 2021-10-26 ENCOUNTER — PATIENT MESSAGE (OUTPATIENT)
Dept: ORTHOPEDICS CLINIC | Facility: CLINIC | Age: 66
End: 2021-10-26

## 2021-10-26 RX ORDER — AMLODIPINE BESYLATE 5 MG/1
5 TABLET ORAL DAILY
Qty: 90 TABLET | Refills: 0 | Status: SHIPPED | OUTPATIENT
Start: 2021-10-26 | End: 2021-11-08

## 2021-10-31 ENCOUNTER — NURSE TRIAGE (OUTPATIENT)
Dept: INTERNAL MEDICINE CLINIC | Facility: CLINIC | Age: 66
End: 2021-10-31

## 2021-10-31 DIAGNOSIS — I10 HYPERTENSION, ESSENTIAL: Primary | ICD-10-CM

## 2021-10-31 NOTE — TELEPHONE ENCOUNTER
----- Message from WaylandAl Miranda sent at 10/31/2021  2:32 PM CDT -----  Regarding: BP - Pulse Rate (am & pm)  Blood Pressure is not lowing with additional meds. Think I need to see you ASAP.   Going in for test Monday, November 1st.

## 2021-11-01 RX ORDER — AMLODIPINE BESYLATE 5 MG/1
5 TABLET ORAL DAILY
Qty: 90 TABLET | Refills: 0 | Status: SHIPPED | OUTPATIENT
Start: 2021-11-01 | End: 2021-11-29

## 2021-11-01 NOTE — TELEPHONE ENCOUNTER
Patient aware of UA in system, but asking if Dr. Aretha Mtz would like to add any blood work to her chart to look for any \"infection. \"    Informed of Amlodipine 5 mg sent to pharmacy.

## 2021-11-01 NOTE — TELEPHONE ENCOUNTER
Action Requested: Summary for Provider     []  Critical Lab, Recommendations Needed  [] Need Additional Advice  []   FYI    []   Need Orders  [] Need Medications Sent to Pharmacy  []  Other     SUMMARY:   BP today 167/93  Patient doubled up on \"L\" medica

## 2021-11-02 ENCOUNTER — LAB ENCOUNTER (OUTPATIENT)
Dept: LAB | Age: 66
End: 2021-11-02
Attending: INTERNAL MEDICINE
Payer: MEDICARE

## 2021-11-02 DIAGNOSIS — N10 ACUTE PYELONEPHRITIS: ICD-10-CM

## 2021-11-02 DIAGNOSIS — I10 HYPERTENSION, ESSENTIAL: ICD-10-CM

## 2021-11-02 PROCEDURE — 87086 URINE CULTURE/COLONY COUNT: CPT

## 2021-11-02 PROCEDURE — 36415 COLL VENOUS BLD VENIPUNCTURE: CPT

## 2021-11-02 PROCEDURE — 81001 URINALYSIS AUTO W/SCOPE: CPT

## 2021-11-02 PROCEDURE — 80053 COMPREHEN METABOLIC PANEL: CPT

## 2021-11-02 PROCEDURE — 85025 COMPLETE CBC W/AUTO DIFF WBC: CPT

## 2021-11-05 ENCOUNTER — TELEPHONE (OUTPATIENT)
Dept: INTERNAL MEDICINE CLINIC | Facility: CLINIC | Age: 66
End: 2021-11-05

## 2021-11-05 ENCOUNTER — PATIENT OUTREACH (OUTPATIENT)
Dept: CASE MANAGEMENT | Age: 66
End: 2021-11-05

## 2021-11-05 DIAGNOSIS — R31.29 MICROSCOPIC HEMATURIA: Primary | ICD-10-CM

## 2021-11-05 NOTE — PROGRESS NOTES
Left message for patient regarding Chronic Care Management program.     [address]   [facility]   Phone: [phone]. com

## 2021-11-08 ENCOUNTER — TELEPHONE (OUTPATIENT)
Dept: INTERNAL MEDICINE CLINIC | Facility: CLINIC | Age: 66
End: 2021-11-08

## 2021-11-08 ENCOUNTER — OFFICE VISIT (OUTPATIENT)
Dept: INTERNAL MEDICINE CLINIC | Facility: CLINIC | Age: 66
End: 2021-11-08
Payer: MEDICARE

## 2021-11-08 VITALS
HEIGHT: 60 IN | WEIGHT: 260.13 LBS | DIASTOLIC BLOOD PRESSURE: 78 MMHG | RESPIRATION RATE: 18 BRPM | SYSTOLIC BLOOD PRESSURE: 128 MMHG | BODY MASS INDEX: 51.07 KG/M2 | HEART RATE: 70 BPM

## 2021-11-08 DIAGNOSIS — Z01.419 WELL FEMALE EXAM WITH ROUTINE GYNECOLOGICAL EXAM: Primary | ICD-10-CM

## 2021-11-08 DIAGNOSIS — I10 PRIMARY HYPERTENSION: Primary | ICD-10-CM

## 2021-11-08 DIAGNOSIS — R31.29 MICROHEMATURIA: ICD-10-CM

## 2021-11-08 PROCEDURE — 99213 OFFICE O/P EST LOW 20 MIN: CPT | Performed by: INTERNAL MEDICINE

## 2021-11-08 RX ORDER — LISINOPRIL 10 MG/1
10 TABLET ORAL 2 TIMES DAILY
Qty: 180 TABLET | Refills: 1 | Status: SHIPPED | OUTPATIENT
Start: 2021-11-08 | End: 2021-12-28

## 2021-11-08 NOTE — PROGRESS NOTES
Subjective:     Patient ID: Rinku Florez is a 73year old female. Hypertension  This is a chronic problem. The current episode started more than 1 month ago. The problem has been gradually improving since onset.  Pertinent negatives include no ches CYANOCOBALAMIN 1000 MCG/ML Injection Solution INJECT 1 ML INTO THE MUSCLE EVERY 15 DAYS 24 mL 1   • Homeopathic Products (IRRITATED EYE RELIEF OP) Apply to eye.      • Fluticasone Propionate 50 MCG/ACT Nasal Suspension SHAKE LIQUID AND USE 2 SPRAYS IN [facility] Social History: Social History    Tobacco Use      Smoking status: Former Smoker        Packs/day: 1.00        Years: 30.00        Pack years: 30        Types: Cigarettes        Quit date: 1996        Years since quittin.8      Smokeless tob Microhematuria  Plan: NEPHROLOGY - INTERNAL        Had microhematuria on recent ua and urine culture was negative. She had ct abd 2mos ago when she was admitted at [address] [place] [address] for acute pyelo and no obstructive process nor any tumor noted.  We discussed the need fo

## 2021-11-08 NOTE — TELEPHONE ENCOUNTER
I had referred pt to Dr Jayleen Sneed for her hematuria; pt had asked if we can assist her to get apptment soon so pls assist pt.n referral already in epic. Thanks.

## 2021-11-09 ENCOUNTER — PATIENT OUTREACH (OUTPATIENT)
Dept: CASE MANAGEMENT | Age: 66
End: 2021-11-09

## 2021-11-09 NOTE — PROGRESS NOTES
Voicemail is full. [address]   [address]   Phone: [phone]. com

## 2021-11-11 ENCOUNTER — OFFICE VISIT (OUTPATIENT)
Dept: ORTHOPEDICS CLINIC | Facility: CLINIC | Age: 66
End: 2021-11-11
Payer: MEDICARE

## 2021-11-11 VITALS
DIASTOLIC BLOOD PRESSURE: 72 MMHG | BODY MASS INDEX: 51.04 KG/M2 | HEART RATE: 69 BPM | SYSTOLIC BLOOD PRESSURE: 125 MMHG | HEIGHT: 60 IN | WEIGHT: 260 LBS

## 2021-11-11 DIAGNOSIS — M17.12 PRIMARY OSTEOARTHRITIS OF LEFT KNEE: Primary | ICD-10-CM

## 2021-11-11 PROCEDURE — 20610 DRAIN/INJ JOINT/BURSA W/O US: CPT | Performed by: PHYSICIAN ASSISTANT

## 2021-11-11 PROCEDURE — 99213 OFFICE O/P EST LOW 20 MIN: CPT | Performed by: ORTHOPAEDIC SURGERY

## 2021-11-11 NOTE — PROGRESS NOTES
NURSING INTAKE COMMENTS: Patient presents with:  Test Results: 73year old female is here today for MRI results on L knee done 10/19/21 and to discuss the next steps for treatment.        HPI: This 73year old female presents today with complaints of left k (fourteen) days for 12 doses. 12 capsule 0   • furosemide 20 MG Oral Tab Take 1 tablet (20 mg total) by mouth daily as needed. 30 tablet 0   • Potassium Chloride ER 10 MEQ Oral Tab CR Take 1 tablet (10 mEq total) by mouth daily as needed.  30 tablet 0   • S throat, headaches  RESPIRATORY: denies new shortness of breath, cough, asthma, wheezing  CARDIOVASCULAR: denies chest pain, leg cramps with exertion, palpitations, leg swelling  GI: denies abdominal pain, nausea, vomiting, diarrhea, constipation, hematoche body coil. Portions of the subcutaneous fat could not be included in the field of view. MENISCI MEDIAL: Normal morphology and signal intensity without visible tear or significant degeneration.  LATERAL: Normal morphology and signal intensity without visib lateral patellar tilt/subluxation and lateralization of the tibial tuberosity. 2. Milder partial-thickness chondral thinning in the medial compartment. 3. Otherwise no acute internal left knee joint derangement.      Dictated by (CST): Bijan Collado MD

## 2021-11-29 ENCOUNTER — OFFICE VISIT (OUTPATIENT)
Dept: NEPHROLOGY | Facility: CLINIC | Age: 66
End: 2021-11-29
Payer: MEDICARE

## 2021-11-29 VITALS
DIASTOLIC BLOOD PRESSURE: 69 MMHG | HEART RATE: 76 BPM | HEIGHT: 60 IN | BODY MASS INDEX: 50.45 KG/M2 | WEIGHT: 257 LBS | SYSTOLIC BLOOD PRESSURE: 135 MMHG

## 2021-11-29 DIAGNOSIS — R31.21 ASYMPTOMATIC MICROSCOPIC HEMATURIA: Primary | ICD-10-CM

## 2021-11-29 PROCEDURE — 99205 OFFICE O/P NEW HI 60 MIN: CPT | Performed by: INTERNAL MEDICINE

## 2021-11-29 RX ORDER — AMLODIPINE BESYLATE 5 MG/1
10 TABLET ORAL 2 TIMES DAILY
Qty: 90 TABLET | Refills: 0 | COMMUNITY
Start: 2021-11-29 | End: 2021-12-28

## 2021-11-29 NOTE — PROGRESS NOTES
11/29/21        Patient: Cata Saleem   YOB: 1955   Date of Visit: 11/29/2021       Dear  Dr. Aretha Mtz MD,      Thank you for referring Cata Saleem to my practice. Please find my assessment and plan below.       As you know s without any chest pain, shortness of breath, GI or urinary tract symptoms. Currently denies any dysuria, frequency or gross hematuria. Still occasional left lower back pain. 10 point review of systems is otherwise unremarkable.     On physical exam her init

## 2021-11-29 NOTE — PATIENT INSTRUCTIONS
Increase amlodipine to 7.5 mg daily. Check your blood pressures daily and call me in 1 week.  Please do laboratory studies and kidney ultrasound as ordered

## 2021-11-30 ENCOUNTER — LAB ENCOUNTER (OUTPATIENT)
Dept: LAB | Age: 66
End: 2021-11-30
Attending: INTERNAL MEDICINE
Payer: MEDICARE

## 2021-11-30 DIAGNOSIS — R31.21 ASYMPTOMATIC MICROSCOPIC HEMATURIA: ICD-10-CM

## 2021-11-30 PROCEDURE — 81001 URINALYSIS AUTO W/SCOPE: CPT

## 2021-11-30 PROCEDURE — 36415 COLL VENOUS BLD VENIPUNCTURE: CPT

## 2021-11-30 PROCEDURE — 87086 URINE CULTURE/COLONY COUNT: CPT

## 2021-11-30 PROCEDURE — 80069 RENAL FUNCTION PANEL: CPT

## 2021-12-02 ENCOUNTER — TELEPHONE (OUTPATIENT)
Dept: NEPHROLOGY | Facility: CLINIC | Age: 66
End: 2021-12-02

## 2021-12-02 DIAGNOSIS — R31.21 ASYMPTOMATIC MICROSCOPIC HEMATURIA: Primary | ICD-10-CM

## 2021-12-02 NOTE — TELEPHONE ENCOUNTER
Spoke with pt, discussed below message. Pt verbalizes understanding. Pt states she has some slight back pain and increased frequency. Denies fever, body aches, chills, nausea, vomiting, hematuria.  Pt has allergy to cipro    Pt also provided BP readings:

## 2021-12-02 NOTE — TELEPHONE ENCOUNTER
The urinalysis does suggest a UTI but the urine culture was negative. Any symptoms? .  If not awaiting renal ultrasound.

## 2021-12-04 ENCOUNTER — LAB ENCOUNTER (OUTPATIENT)
Dept: LAB | Age: 66
End: 2021-12-04
Attending: INTERNAL MEDICINE
Payer: MEDICARE

## 2021-12-04 DIAGNOSIS — R31.21 ASYMPTOMATIC MICROSCOPIC HEMATURIA: ICD-10-CM

## 2021-12-04 PROCEDURE — 87086 URINE CULTURE/COLONY COUNT: CPT

## 2021-12-15 ENCOUNTER — OFFICE VISIT (OUTPATIENT)
Dept: OBGYN CLINIC | Facility: CLINIC | Age: 66
End: 2021-12-15
Payer: MEDICARE

## 2021-12-15 VITALS
HEART RATE: 71 BPM | BODY MASS INDEX: 50 KG/M2 | WEIGHT: 256.63 LBS | SYSTOLIC BLOOD PRESSURE: 121 MMHG | DIASTOLIC BLOOD PRESSURE: 76 MMHG

## 2021-12-15 DIAGNOSIS — Z01.419 ENCOUNTER FOR GYNECOLOGICAL EXAMINATION: Primary | ICD-10-CM

## 2021-12-15 DIAGNOSIS — Z12.4 SCREENING FOR MALIGNANT NEOPLASM OF CERVIX: ICD-10-CM

## 2021-12-15 PROCEDURE — G0101 CA SCREEN;PELVIC/BREAST EXAM: HCPCS | Performed by: OBSTETRICS & GYNECOLOGY

## 2021-12-17 NOTE — PROGRESS NOTES
Cata Saleem is a 73year old female  No LMP recorded. Patient is postmenopausal. here for annual exam.       New pt. Last mammogram in 2021. No gyne issues. Having frequent UTI. Was hospitalized 10/2021.   Seeing nephrology    OBST Disease Mother    • Other (Other) Mother    • Melanoma Father    • Cancer Father         Basal Cell Cancer   • Other (Other) Other         No Family h   • Other (Other) Other         No Family h   • Diabetes Sister    • Obesity Sister        MEDICATIONS: denies headaches, extremity weakness or numbness. Psychiatric: denies depression or anxiety. Endocrine:   denies excessive thirst or urination. Heme/Lymph:  easy bruising or bleeding.     PHYSICAL EXAM:   /76   Pulse 71   Wt 256 lb 9.6 oz (116.4 kg

## 2021-12-21 ENCOUNTER — HOSPITAL ENCOUNTER (OUTPATIENT)
Dept: ULTRASOUND IMAGING | Facility: HOSPITAL | Age: 66
Discharge: HOME | End: 2021-12-21
Attending: INTERNAL MEDICINE
Payer: MEDICARE

## 2021-12-21 DIAGNOSIS — R31.21 ASYMPTOMATIC MICROSCOPIC HEMATURIA: ICD-10-CM

## 2021-12-21 PROCEDURE — 76770 US EXAM ABDO BACK WALL COMP: CPT | Performed by: INTERNAL MEDICINE

## 2021-12-28 ENCOUNTER — OFFICE VISIT (OUTPATIENT)
Dept: NEPHROLOGY | Facility: CLINIC | Age: 66
End: 2021-12-28
Payer: MEDICARE

## 2021-12-28 VITALS
WEIGHT: 260 LBS | SYSTOLIC BLOOD PRESSURE: 132 MMHG | DIASTOLIC BLOOD PRESSURE: 67 MMHG | HEART RATE: 65 BPM | BODY MASS INDEX: 51 KG/M2

## 2021-12-28 DIAGNOSIS — R31.21 ASYMPTOMATIC MICROSCOPIC HEMATURIA: Primary | ICD-10-CM

## 2021-12-28 PROCEDURE — 99213 OFFICE O/P EST LOW 20 MIN: CPT | Performed by: INTERNAL MEDICINE

## 2021-12-28 RX ORDER — AMLODIPINE BESYLATE 5 MG/1
5 TABLET ORAL 2 TIMES DAILY
Qty: 180 TABLET | Refills: 0 | Status: SHIPPED | OUTPATIENT
Start: 2021-12-28

## 2021-12-28 RX ORDER — LISINOPRIL 10 MG/1
10 TABLET ORAL 2 TIMES DAILY
Qty: 180 TABLET | Refills: 0 | Status: SHIPPED | OUTPATIENT
Start: 2021-12-28

## 2021-12-29 NOTE — PROGRESS NOTES
12/28/21        Patient: Elida Smith   YOB: 1955   Date of Visit: 12/28/2021       Dear  Dr. Michael Pfeiffer MD,      Thank you for referring Elida Smith to my practice. Please find my assessment and plan below.       As you know s Pb Bains MD

## 2022-01-25 ENCOUNTER — OFFICE VISIT (OUTPATIENT)
Dept: SURGERY | Facility: CLINIC | Age: 67
End: 2022-01-25
Payer: MEDICARE

## 2022-01-25 VITALS
WEIGHT: 248.31 LBS | DIASTOLIC BLOOD PRESSURE: 74 MMHG | HEIGHT: 60.5 IN | SYSTOLIC BLOOD PRESSURE: 126 MMHG | BODY MASS INDEX: 47.49 KG/M2 | HEART RATE: 77 BPM | OXYGEN SATURATION: 97 %

## 2022-01-25 DIAGNOSIS — E78.5 DYSLIPIDEMIA: ICD-10-CM

## 2022-01-25 DIAGNOSIS — E66.01 MORBID OBESITY WITH BMI OF 50.0-59.9, ADULT (HCC): ICD-10-CM

## 2022-01-25 DIAGNOSIS — E88.81 INSULIN RESISTANCE: Primary | ICD-10-CM

## 2022-01-25 DIAGNOSIS — I10 BENIGN ESSENTIAL HTN: ICD-10-CM

## 2022-01-25 PROCEDURE — 99214 OFFICE O/P EST MOD 30 MIN: CPT | Performed by: INTERNAL MEDICINE

## 2022-01-25 NOTE — PROGRESS NOTES
[address]  Dept: [phone]       Patient:  Jovanny Fleming  :      1955  MRN:      [id number]    Chief Complaint:  Patient pr tablet (20 mg total) by mouth daily as needed. 30 tablet 0   • Potassium Chloride ER 10 MEQ Oral Tab CR Take 1 tablet (10 mEq total) by mouth daily as needed.  30 tablet 0   • Syringe/Needle, Disp, (BD LUER-MARGARITO SYRINGE) 25G X 1\" 3 ML Does not apply [address] [place] [address] Drive Self-Exams: Not Asked    Social History Narrative      Not on file    Social Determinants of Health  Financial Resource Strain: Low Risk       Difficulty of Paying Living Expenses: Not hard at all  Food Insecurity: Not on file  Transportation Needs: No Tra waking  and Eat 3-4 cups of fresh fruits or vegetables daily    Behavior Modifications Reviewed and Discussed  Eat breakfast, Eat 3 meals per day, Plan meals in advance, Read nutrition labels, Drink 64 oz of water per day, Maintain a daily food journal, No (hcc)    PLAN     Patient is not interested in bariatric surgery. Patient desires to pursue traditional weight loss at this time. HYPERTENSION: Blood pressure stable on the above medications. No interval change in antihypertensive medication.    Talia Jo

## 2022-02-08 ENCOUNTER — LAB ENCOUNTER (OUTPATIENT)
Dept: LAB | Age: 67
End: 2022-02-08
Attending: INTERNAL MEDICINE
Payer: MEDICARE

## 2022-02-08 ENCOUNTER — OFFICE VISIT (OUTPATIENT)
Dept: INTERNAL MEDICINE CLINIC | Facility: CLINIC | Age: 67
End: 2022-02-08
Payer: MEDICARE

## 2022-02-08 ENCOUNTER — HOSPITAL ENCOUNTER (OUTPATIENT)
Dept: GENERAL RADIOLOGY | Age: 67
Discharge: HOME | End: 2022-02-08
Attending: INTERNAL MEDICINE
Payer: MEDICARE

## 2022-02-08 VITALS
BODY MASS INDEX: 47.89 KG/M2 | WEIGHT: 250.38 LBS | DIASTOLIC BLOOD PRESSURE: 76 MMHG | HEIGHT: 60.5 IN | OXYGEN SATURATION: 95 % | SYSTOLIC BLOOD PRESSURE: 120 MMHG | HEART RATE: 73 BPM | TEMPERATURE: 99 F

## 2022-02-08 DIAGNOSIS — E78.2 MIXED HYPERLIPIDEMIA: ICD-10-CM

## 2022-02-08 DIAGNOSIS — R73.03 PREDIABETES: ICD-10-CM

## 2022-02-08 DIAGNOSIS — R05.9 COUGH: ICD-10-CM

## 2022-02-08 DIAGNOSIS — R31.29 MICROHEMATURIA: ICD-10-CM

## 2022-02-08 DIAGNOSIS — E03.8 SUBCLINICAL HYPOTHYROIDISM: ICD-10-CM

## 2022-02-08 DIAGNOSIS — E55.9 VITAMIN D DEFICIENCY: ICD-10-CM

## 2022-02-08 DIAGNOSIS — I10 PRIMARY HYPERTENSION: Primary | ICD-10-CM

## 2022-02-08 LAB
ALBUMIN SERPL-MCNC: 3.8 G/DL (ref 3.4–5)
ALBUMIN/GLOB SERPL: 1.1 {RATIO} (ref 1–2)
ALP LIVER SERPL-CCNC: 102 U/L
ALT SERPL-CCNC: 22 U/L
ANION GAP SERPL CALC-SCNC: 10 MMOL/L (ref 0–18)
AST SERPL-CCNC: 11 U/L (ref 15–37)
BILIRUB SERPL-MCNC: 0.5 MG/DL (ref 0.1–2)
BUN BLD-MCNC: 16 MG/DL (ref 7–18)
BUN/CREAT SERPL: 20.3 (ref 10–20)
CALCIUM BLD-MCNC: 9.5 MG/DL (ref 8.5–10.1)
CHLORIDE SERPL-SCNC: 107 MMOL/L (ref 98–112)
CHOLEST SERPL-MCNC: 242 MG/DL (ref ?–200)
CO2 SERPL-SCNC: 25 MMOL/L (ref 21–32)
CREAT BLD-MCNC: 0.79 MG/DL
EST. AVERAGE GLUCOSE BLD GHB EST-MCNC: 114 MG/DL (ref 68–126)
FASTING PATIENT LIPID ANSWER: YES
FASTING STATUS PATIENT QL REPORTED: YES
GLOBULIN PLAS-MCNC: 3.5 G/DL (ref 2.8–4.4)
GLUCOSE BLD-MCNC: 110 MG/DL (ref 70–99)
HBA1C MFR BLD: 5.6 % (ref ?–5.7)
HDLC SERPL-MCNC: 36 MG/DL (ref 40–59)
LDLC SERPL CALC-MCNC: 150 MG/DL (ref ?–100)
NONHDLC SERPL-MCNC: 206 MG/DL (ref ?–130)
OSMOLALITY SERPL CALC.SUM OF ELEC: 296 MOSM/KG (ref 275–295)
POTASSIUM SERPL-SCNC: 4.7 MMOL/L (ref 3.5–5.1)
PROT SERPL-MCNC: 7.3 G/DL (ref 6.4–8.2)
SODIUM SERPL-SCNC: 142 MMOL/L (ref 136–145)
T3FREE SERPL-MCNC: 2.96 PG/ML (ref 2.4–4.2)
T4 FREE SERPL-MCNC: 1 NG/DL (ref 0.8–1.7)
TRIGL SERPL-MCNC: 302 MG/DL (ref 30–149)
TSI SER-ACNC: 8.03 MIU/ML (ref 0.36–3.74)
VIT D+METAB SERPL-MCNC: 15.8 NG/ML (ref 30–100)
VLDLC SERPL CALC-MCNC: 59 MG/DL (ref 0–30)

## 2022-02-08 PROCEDURE — 36415 COLL VENOUS BLD VENIPUNCTURE: CPT

## 2022-02-08 PROCEDURE — 83036 HEMOGLOBIN GLYCOSYLATED A1C: CPT

## 2022-02-08 PROCEDURE — 84481 FREE ASSAY (FT-3): CPT

## 2022-02-08 PROCEDURE — 71046 X-RAY EXAM CHEST 2 VIEWS: CPT | Performed by: INTERNAL MEDICINE

## 2022-02-08 PROCEDURE — 80061 LIPID PANEL: CPT

## 2022-02-08 PROCEDURE — 84439 ASSAY OF FREE THYROXINE: CPT

## 2022-02-08 PROCEDURE — 84443 ASSAY THYROID STIM HORMONE: CPT

## 2022-02-08 PROCEDURE — 99214 OFFICE O/P EST MOD 30 MIN: CPT | Performed by: INTERNAL MEDICINE

## 2022-02-08 PROCEDURE — 82306 VITAMIN D 25 HYDROXY: CPT

## 2022-02-08 PROCEDURE — 80053 COMPREHEN METABOLIC PANEL: CPT

## 2022-02-08 RX ORDER — IRBESARTAN 150 MG/1
150 TABLET ORAL NIGHTLY
Qty: 90 TABLET | Refills: 0 | Status: SHIPPED | OUTPATIENT
Start: 2022-02-08

## 2022-02-14 ENCOUNTER — TELEPHONE (OUTPATIENT)
Dept: INTERNAL MEDICINE CLINIC | Facility: CLINIC | Age: 67
End: 2022-02-14

## 2022-02-14 RX ORDER — ERGOCALCIFEROL 1.25 MG/1
50000 CAPSULE ORAL WEEKLY
Qty: 12 CAPSULE | Refills: 0 | Status: SHIPPED | OUTPATIENT
Start: 2022-02-14 | End: 2022-05-03

## 2022-03-01 ENCOUNTER — TELEPHONE (OUTPATIENT)
Dept: INTERNAL MEDICINE CLINIC | Facility: CLINIC | Age: 67
End: 2022-03-01

## 2022-03-01 DIAGNOSIS — J98.6 ELEVATED HEMIDIAPHRAGM: Primary | ICD-10-CM

## 2022-03-01 RX ORDER — ATORVASTATIN CALCIUM 10 MG/1
10 TABLET, FILM COATED ORAL NIGHTLY
Qty: 90 TABLET | Refills: 0 | Status: SHIPPED | OUTPATIENT
Start: 2022-03-01 | End: 2023-03-12

## 2022-03-01 RX ORDER — CEPHALEXIN 500 MG/1
500 CAPSULE ORAL 2 TIMES DAILY
Qty: 14 CAPSULE | Refills: 0 | Status: SHIPPED | OUTPATIENT
Start: 2022-03-01 | End: 2022-08-09

## 2022-03-01 NOTE — TELEPHONE ENCOUNTER
Talked to pt regading labs cxr and she told me she had been having urinary urgency, incontinence, dribbling for the past 2 days; no dysuria or hematuria or fevers; some fatigue  I advised to do ua and culture but pt states busy right now taking care of  for her dad who just past away. She had asked if she can start abx since she had hx of uti in the past. I told her will send keflex to pharmacy; call back if symptoms persist.    Also d/w pt regarding starting chol med for her elevated chol and agreed. Will start atorvastatin 10mg q hs; potential side effects d/w pt. Lastly, cxr result, she states her cough had resovled since switching bp med to ARB. cxr though showed elevated right hemidiaphragm.  I told her needs further eval to check if hemidiaphragm paralysis; will need to do SNIFF test. Pt agreed and will schedule test.

## 2022-04-28 ENCOUNTER — OFFICE VISIT (OUTPATIENT)
Dept: SURGERY | Facility: CLINIC | Age: 67
End: 2022-04-28
Payer: MEDICARE

## 2022-04-28 VITALS
BODY MASS INDEX: 45.1 KG/M2 | SYSTOLIC BLOOD PRESSURE: 128 MMHG | DIASTOLIC BLOOD PRESSURE: 76 MMHG | HEART RATE: 77 BPM | RESPIRATION RATE: 18 BRPM | HEIGHT: 60.5 IN | OXYGEN SATURATION: 100 % | WEIGHT: 235.81 LBS

## 2022-04-28 DIAGNOSIS — E78.5 DYSLIPIDEMIA: ICD-10-CM

## 2022-04-28 DIAGNOSIS — J02.9 ACUTE PHARYNGITIS, UNSPECIFIED ETIOLOGY: ICD-10-CM

## 2022-04-28 DIAGNOSIS — I10 BENIGN ESSENTIAL HTN: ICD-10-CM

## 2022-04-28 DIAGNOSIS — E66.01 MORBID OBESITY WITH BMI OF 45.0-49.9, ADULT (HCC): ICD-10-CM

## 2022-04-28 DIAGNOSIS — E88.81 INSULIN RESISTANCE: Primary | ICD-10-CM

## 2022-04-28 PROCEDURE — 99214 OFFICE O/P EST MOD 30 MIN: CPT | Performed by: INTERNAL MEDICINE

## 2022-04-28 RX ORDER — AZITHROMYCIN 250 MG/1
TABLET, FILM COATED ORAL
Qty: 6 TABLET | Refills: 0 | Status: SHIPPED | OUTPATIENT
Start: 2022-04-28 | End: 2022-05-03

## 2022-05-15 RX ORDER — IRBESARTAN 150 MG/1
150 TABLET ORAL NIGHTLY
Qty: 90 TABLET | Refills: 1 | Status: SHIPPED | OUTPATIENT
Start: 2022-05-15 | End: 2023-01-20

## 2022-05-15 NOTE — TELEPHONE ENCOUNTER
Refill passed per [facility] protocol.     Requested Prescriptions   Pending Prescriptions Disp Refills    IRBESARTAN 150 MG Oral Tab [Pharmacy Med Name: IRBESARTAN 150MG TABLETS] 90 tablet 0     Sig: TAKE 1 TABLET(150 MG) BY MOUTH EVERY NIGHT        Hypertensive Medications Protocol Passed - 5/15/2022 12:32 PM        Passed - CMP or BMP in past 12 months        Passed - Appointment in past 6 or next 3 months        Passed - GFR Non- > 50     Lab Results   Component Value Date    GFRNAA 78 02/08/2022                       Recent Outpatient Visits              2 weeks ago Insulin resistance    Shantel Locke MD    Office Visit    3 months ago Primary hypertension    [place] [facility], [address], Mark Burgess MD    Office Visit    3 months ago Insulin resistance    [facility], Angi Russo MD    Office Visit    4 months ago Asymptomatic microscopic hematuria    [facility], [facility], [facility] MD Kathi    Office Visit    5 months ago Encounter for gynecological examination    [facility], [address], Inez Saez MD    Office Visit            Future Appointments         Provider Department Appt Notes    In 2 months Severo Alvarado MD [address], [address], [place] MEDICARE  NON SX F/U

## 2022-05-24 NOTE — TELEPHONE ENCOUNTER
Assessment/Plan:    No problem-specific Assessment & Plan notes found for this encounter  Diagnoses and all orders for this visit:    Abdominal pain in pediatric patient  -     H  pylori antigen, stool; Future    Nausea    Anorexia  -     cyproheptadine (PERIACTIN) 4 mg tablet; Take 2 tablets (8 mg total) by mouth daily at bedtime    Mild protein-calorie malnutrition (HCC)    Poor weight gain (0-17)  -     cyproheptadine (PERIACTIN) 4 mg tablet; Take 2 tablets (8 mg total) by mouth daily at bedtime    Functional constipation  -     docusate sodium (COLACE) 100 mg capsule; Take 1 capsule (100 mg total) by mouth in the morning and 1 capsule (100 mg total) in the evening  Joanne Bustillos is a thin now 26-year-old female with history of anorexia, poor weight gain, malnutrition, H pylori gastritis, constipation, and nausea presents today for follow up  At this time will restart the patient on cyproheptadine 8 mg po qhs for her appetite in addition to restarting the Colace 100 mg po bid to address the abdominal pain and nausea  Will resend the stool H Pylori stool antigen to confirm cure  Will follow up in 2-3 months  Subjective:      Patient ID: Joanne Bustillos is a 17 y o  female  It is my pleasure to see Joanne Bustillos who as you know is a well appearing now 17 y o  female with a history of abdominal pain, nausea, H pylori gastritis, anorexia, poor weight gain and malnutrition presents today for follow-up  Since being seen last the patient states she continues to have intermittent abdominal pain, in addition to morning time nausea  Bowel movements are described as once daily without any pain or straining  The patient has discontinued all medication  The patient states that while being on the cyproheptadine did have a significantly improved appetite  Mother states the patient struggles to eat        The following portions of the patient's history were reviewed and updated as Order placed. Spoke with pt, discussed below message. Pt verbalizes understanding. appropriate: allergies, current medications, past family history, past medical history, past social history, past surgical history and problem list     Review of Systems   All other systems reviewed and are negative  Objective:      BP (!) 100/68 (BP Location: Left arm, Patient Position: Sitting, Cuff Size: Adult)   Ht 5' 1 14" (1 553 m)   Wt 42 8 kg (94 lb 5 7 oz)   BMI 17 75 kg/m²          Physical Exam  Constitutional:       Appearance: She is well-developed  HENT:      Head: Normocephalic and atraumatic  Eyes:      Conjunctiva/sclera: Conjunctivae normal       Pupils: Pupils are equal, round, and reactive to light  Cardiovascular:      Rate and Rhythm: Normal rate and regular rhythm  Heart sounds: Normal heart sounds  Pulmonary:      Effort: Pulmonary effort is normal       Breath sounds: Normal breath sounds  Abdominal:      General: Bowel sounds are normal       Palpations: Abdomen is soft  There is mass (stool in LLQ)  Tenderness: There is no abdominal tenderness  Musculoskeletal:         General: Normal range of motion  Cervical back: Normal range of motion and neck supple  Skin:     General: Skin is warm  Neurological:      Mental Status: She is alert and oriented to person, place, and time

## 2022-06-29 ENCOUNTER — OFFICE VISIT (OUTPATIENT)
Dept: INTERNAL MEDICINE CLINIC | Facility: CLINIC | Age: 67
End: 2022-06-29
Payer: MEDICARE

## 2022-06-29 VITALS
HEIGHT: 60.5 IN | HEART RATE: 71 BPM | DIASTOLIC BLOOD PRESSURE: 73 MMHG | SYSTOLIC BLOOD PRESSURE: 124 MMHG | BODY MASS INDEX: 46.29 KG/M2 | WEIGHT: 242 LBS

## 2022-06-29 DIAGNOSIS — R35.0 URINARY FREQUENCY: Primary | ICD-10-CM

## 2022-06-29 LAB
BILIRUB UR QL: NEGATIVE
CLARITY UR: CLEAR
COLOR UR: YELLOW
GLUCOSE UR-MCNC: NEGATIVE MG/DL
HGB UR QL STRIP.AUTO: NEGATIVE
KETONES UR-MCNC: NEGATIVE MG/DL
LEUKOCYTE ESTERASE UR QL STRIP.AUTO: NEGATIVE
NITRITE UR QL STRIP.AUTO: NEGATIVE
PH UR: 5 [PH] (ref 5–8)
PROT UR-MCNC: NEGATIVE MG/DL
SP GR UR STRIP: 1.02 (ref 1–1.03)
UROBILINOGEN UR STRIP-ACNC: 0.2

## 2022-06-29 PROCEDURE — 99213 OFFICE O/P EST LOW 20 MIN: CPT | Performed by: INTERNAL MEDICINE

## 2022-06-29 PROCEDURE — 1125F AMNT PAIN NOTED PAIN PRSNT: CPT | Performed by: INTERNAL MEDICINE

## 2022-06-29 RX ORDER — CEPHALEXIN 500 MG/1
500 CAPSULE ORAL 2 TIMES DAILY
Qty: 14 CAPSULE | Refills: 0 | Status: SHIPPED | OUTPATIENT
Start: 2022-06-29

## 2022-07-01 ENCOUNTER — TELEPHONE (OUTPATIENT)
Dept: INTERNAL MEDICINE CLINIC | Facility: CLINIC | Age: 67
End: 2022-07-01

## 2022-07-01 DIAGNOSIS — N30.00 ACUTE CYSTITIS WITHOUT HEMATURIA: Primary | ICD-10-CM

## 2022-07-07 ENCOUNTER — LAB ENCOUNTER (OUTPATIENT)
Dept: LAB | Age: 67
End: 2022-07-07
Attending: INTERNAL MEDICINE
Payer: MEDICARE

## 2022-07-07 DIAGNOSIS — N30.00 ACUTE CYSTITIS WITHOUT HEMATURIA: ICD-10-CM

## 2022-07-07 LAB
BILIRUB UR QL: NEGATIVE
CLARITY UR: CLEAR
COLOR UR: YELLOW
GLUCOSE UR-MCNC: NEGATIVE MG/DL
HGB UR QL STRIP.AUTO: NEGATIVE
KETONES UR-MCNC: NEGATIVE MG/DL
LEUKOCYTE ESTERASE UR QL STRIP.AUTO: NEGATIVE
NITRITE UR QL STRIP.AUTO: NEGATIVE
PH UR: 5.5 [PH] (ref 5–8)
PROT UR-MCNC: NEGATIVE MG/DL
SP GR UR STRIP: 1.02 (ref 1–1.03)
UROBILINOGEN UR STRIP-ACNC: 0.2

## 2022-07-07 PROCEDURE — 81003 URINALYSIS AUTO W/O SCOPE: CPT

## 2022-08-09 ENCOUNTER — OFFICE VISIT (OUTPATIENT)
Dept: SURGERY | Facility: CLINIC | Age: 67
End: 2022-08-09
Payer: MEDICARE

## 2022-08-09 VITALS
DIASTOLIC BLOOD PRESSURE: 64 MMHG | SYSTOLIC BLOOD PRESSURE: 130 MMHG | HEART RATE: 68 BPM | OXYGEN SATURATION: 95 % | BODY MASS INDEX: 45.39 KG/M2 | HEIGHT: 60.5 IN | WEIGHT: 237.31 LBS

## 2022-08-09 DIAGNOSIS — E66.01 MORBID OBESITY WITH BMI OF 45.0-49.9, ADULT (HCC): ICD-10-CM

## 2022-08-09 DIAGNOSIS — E78.5 DYSLIPIDEMIA: ICD-10-CM

## 2022-08-09 DIAGNOSIS — E88.81 INSULIN RESISTANCE: ICD-10-CM

## 2022-08-09 DIAGNOSIS — I10 BENIGN ESSENTIAL HTN: Primary | ICD-10-CM

## 2022-08-09 PROBLEM — F43.9 STRESS: Status: ACTIVE | Noted: 2022-08-09

## 2022-08-09 PROCEDURE — 99214 OFFICE O/P EST MOD 30 MIN: CPT | Performed by: INTERNAL MEDICINE

## 2022-08-09 RX ORDER — DIETHYLPROPION HYDROCHLORIDE 25 MG/1
1 TABLET ORAL
Qty: 30 TABLET | Refills: 1 | Status: SHIPPED | OUTPATIENT
Start: 2022-08-09 | End: 2022-09-08

## 2022-10-20 ENCOUNTER — NURSE TRIAGE (OUTPATIENT)
Dept: INTERNAL MEDICINE CLINIC | Facility: CLINIC | Age: 67
End: 2022-10-20

## 2022-10-20 ENCOUNTER — LAB ENCOUNTER (OUTPATIENT)
Dept: LAB | Age: 67
End: 2022-10-20
Attending: INTERNAL MEDICINE
Payer: MEDICARE

## 2022-10-20 ENCOUNTER — TELEMEDICINE (OUTPATIENT)
Dept: INTERNAL MEDICINE CLINIC | Facility: CLINIC | Age: 67
End: 2022-10-20
Payer: MEDICARE

## 2022-10-20 DIAGNOSIS — R30.0 DYSURIA: ICD-10-CM

## 2022-10-20 DIAGNOSIS — R30.0 DYSURIA: Primary | ICD-10-CM

## 2022-10-20 LAB
AMB EXT COVID-19 RESULT: DETECTED
BILIRUB UR QL: NEGATIVE
CLARITY UR: CLEAR
COLOR UR: YELLOW
GLUCOSE UR-MCNC: NEGATIVE MG/DL
HGB UR QL STRIP.AUTO: NEGATIVE
LEUKOCYTE ESTERASE UR QL STRIP.AUTO: NEGATIVE
NITRITE UR QL STRIP.AUTO: NEGATIVE
PH UR: 5 [PH] (ref 5–8)
PROT UR-MCNC: 30 MG/DL
SP GR UR STRIP: 1.02 (ref 1–1.03)
UROBILINOGEN UR STRIP-ACNC: <2
VIT C UR-MCNC: NEGATIVE MG/DL

## 2022-10-20 PROCEDURE — 87086 URINE CULTURE/COLONY COUNT: CPT

## 2022-10-20 PROCEDURE — 99441 PHONE E/M BY PHYS 5-10 MIN: CPT | Performed by: INTERNAL MEDICINE

## 2022-10-20 PROCEDURE — 81001 URINALYSIS AUTO W/SCOPE: CPT

## 2022-10-20 RX ORDER — NIRMATRELVIR AND RITONAVIR 300-100 MG
KIT ORAL
Qty: 30 TABLET | Refills: 0 | Status: SHIPPED | OUTPATIENT
Start: 2022-10-20 | End: 2022-10-25

## 2022-10-20 NOTE — TELEPHONE ENCOUNTER
Spoke with patient, (  Name and  verified ) informed of 's  instructions below    Will have the RX picked up     Advised to call back if symptoms and/ or condition worsens    Patient verbalizes understanding and agrees.

## 2022-11-21 ENCOUNTER — HOSPITAL ENCOUNTER (OUTPATIENT)
Age: 67
Discharge: HOME | End: 2022-11-21
Payer: MEDICARE

## 2022-11-21 VITALS
SYSTOLIC BLOOD PRESSURE: 152 MMHG | OXYGEN SATURATION: 98 % | TEMPERATURE: 98 F | HEART RATE: 79 BPM | DIASTOLIC BLOOD PRESSURE: 75 MMHG | RESPIRATION RATE: 18 BRPM

## 2022-11-21 DIAGNOSIS — S61.211A LACERATION OF LEFT INDEX FINGER WITHOUT FOREIGN BODY, NAIL DAMAGE STATUS UNSPECIFIED, INITIAL ENCOUNTER: Primary | ICD-10-CM

## 2022-11-21 PROCEDURE — 90715 TDAP VACCINE 7 YRS/> IM: CPT | Performed by: NURSE PRACTITIONER

## 2022-11-21 PROCEDURE — 90471 IMMUNIZATION ADMIN: CPT | Performed by: NURSE PRACTITIONER

## 2022-11-21 PROCEDURE — 99213 OFFICE O/P EST LOW 20 MIN: CPT | Performed by: NURSE PRACTITIONER

## 2022-11-21 NOTE — DISCHARGE INSTRUCTIONS
The wound will heal on its own you may apply over-the-counter bacitracin or Polysporin over the wound 2 to 3 days and cover with a Band-Aid and wear the finger splint to keep yourself from hitting the finger up against anything at bedtime finger splint can be often sleep with it open to air. If you develop any pus or drainage from the finger swelling fevers or chills redness of the skin these are signs and symptoms of infection return to [facility] or go to the emergency department. Follow-up with your primary care provider 2 to 3 days for wound check.

## 2023-01-20 ENCOUNTER — OFFICE VISIT (OUTPATIENT)
Dept: INTERNAL MEDICINE CLINIC | Facility: CLINIC | Age: 68
End: 2023-01-20

## 2023-01-20 VITALS
HEART RATE: 67 BPM | SYSTOLIC BLOOD PRESSURE: 148 MMHG | OXYGEN SATURATION: 96 % | TEMPERATURE: 98 F | WEIGHT: 243.19 LBS | HEIGHT: 65 IN | BODY MASS INDEX: 40.52 KG/M2 | DIASTOLIC BLOOD PRESSURE: 84 MMHG

## 2023-01-20 DIAGNOSIS — E55.9 VITAMIN D DEFICIENCY: ICD-10-CM

## 2023-01-20 DIAGNOSIS — E66.01 MORBID OBESITY WITH BMI OF 45.0-49.9, ADULT (HCC): ICD-10-CM

## 2023-01-20 DIAGNOSIS — E03.8 SUBCLINICAL HYPOTHYROIDISM: ICD-10-CM

## 2023-01-20 DIAGNOSIS — R73.03 PREDIABETES: ICD-10-CM

## 2023-01-20 DIAGNOSIS — Z12.31 ENCOUNTER FOR SCREENING MAMMOGRAM FOR BREAST CANCER: ICD-10-CM

## 2023-01-20 DIAGNOSIS — I10 PRIMARY HYPERTENSION: Primary | ICD-10-CM

## 2023-01-20 DIAGNOSIS — E78.2 MIXED HYPERLIPIDEMIA: ICD-10-CM

## 2023-01-20 PROCEDURE — 90677 PCV20 VACCINE IM: CPT | Performed by: INTERNAL MEDICINE

## 2023-01-20 PROCEDURE — 90662 IIV NO PRSV INCREASED AG IM: CPT | Performed by: INTERNAL MEDICINE

## 2023-01-20 PROCEDURE — G0008 ADMIN INFLUENZA VIRUS VAC: HCPCS | Performed by: INTERNAL MEDICINE

## 2023-01-20 PROCEDURE — 1126F AMNT PAIN NOTED NONE PRSNT: CPT | Performed by: INTERNAL MEDICINE

## 2023-01-20 PROCEDURE — 99214 OFFICE O/P EST MOD 30 MIN: CPT | Performed by: INTERNAL MEDICINE

## 2023-01-20 PROCEDURE — G0009 ADMIN PNEUMOCOCCAL VACCINE: HCPCS | Performed by: INTERNAL MEDICINE

## 2023-01-20 RX ORDER — CLONIDINE HYDROCHLORIDE 0.1 MG/1
TABLET ORAL
Qty: 30 TABLET | Refills: 0 | Status: SHIPPED | OUTPATIENT
Start: 2023-01-20

## 2023-01-20 RX ORDER — IRBESARTAN AND HYDROCHLOROTHIAZIDE 150; 12.5 MG/1; MG/1
1 TABLET, FILM COATED ORAL DAILY
Qty: 30 TABLET | Refills: 1 | Status: SHIPPED | OUTPATIENT
Start: 2023-01-20

## 2023-01-25 ENCOUNTER — TELEPHONE (OUTPATIENT)
Dept: INTERNAL MEDICINE CLINIC | Facility: CLINIC | Age: 68
End: 2023-01-25

## 2023-01-25 NOTE — TELEPHONE ENCOUNTER
Left message for patient to call back and discuss. Detailed Mychart message sent as well. 0 = independent

## 2023-02-17 ENCOUNTER — OFFICE VISIT (OUTPATIENT)
Dept: INTERNAL MEDICINE CLINIC | Facility: CLINIC | Age: 68
End: 2023-02-17

## 2023-02-17 VITALS
HEIGHT: 65 IN | SYSTOLIC BLOOD PRESSURE: 126 MMHG | HEART RATE: 75 BPM | OXYGEN SATURATION: 96 % | TEMPERATURE: 98 F | BODY MASS INDEX: 40.54 KG/M2 | DIASTOLIC BLOOD PRESSURE: 76 MMHG | WEIGHT: 243.31 LBS

## 2023-02-17 DIAGNOSIS — E66.01 CLASS 3 SEVERE OBESITY DUE TO EXCESS CALORIES WITH SERIOUS COMORBIDITY AND BODY MASS INDEX (BMI) OF 40.0 TO 44.9 IN ADULT (HCC): ICD-10-CM

## 2023-02-17 DIAGNOSIS — F33.9 DEPRESSION, RECURRENT (HCC): ICD-10-CM

## 2023-02-17 DIAGNOSIS — I10 PRIMARY HYPERTENSION: ICD-10-CM

## 2023-02-17 DIAGNOSIS — I70.0 ATHEROSCLEROSIS OF ABDOMINAL AORTA (HCC): ICD-10-CM

## 2023-02-17 DIAGNOSIS — R73.03 PREDIABETES: ICD-10-CM

## 2023-02-17 DIAGNOSIS — Z12.11 COLON CANCER SCREENING: ICD-10-CM

## 2023-02-17 DIAGNOSIS — M85.88 OSTEOPENIA OF LUMBAR SPINE: ICD-10-CM

## 2023-02-17 DIAGNOSIS — J98.6 ELEVATED HEMIDIAPHRAGM: ICD-10-CM

## 2023-02-17 DIAGNOSIS — Z12.31 ENCOUNTER FOR SCREENING MAMMOGRAM FOR BREAST CANCER: ICD-10-CM

## 2023-02-17 DIAGNOSIS — Z00.00 MEDICARE ANNUAL WELLNESS VISIT, SUBSEQUENT: Primary | ICD-10-CM

## 2023-02-17 DIAGNOSIS — E53.8 VITAMIN B12 DEFICIENCY: ICD-10-CM

## 2023-02-17 DIAGNOSIS — E78.2 MIXED HYPERLIPIDEMIA: ICD-10-CM

## 2023-02-17 DIAGNOSIS — E03.8 SUBCLINICAL HYPOTHYROIDISM: ICD-10-CM
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
## 2023-02-17 DIAGNOSIS — E55.9 VITAMIN D DEFICIENCY: ICD-10-CM

## 2023-02-17 RX ORDER — AMOXICILLIN AND CLAVULANATE POTASSIUM 875; 125 MG/1; MG/1
1 TABLET, FILM COATED ORAL 2 TIMES DAILY
Qty: 14 TABLET | Refills: 0 | Status: SHIPPED | OUTPATIENT
Start: 2023-02-17 | End: 2023-02-24

## 2023-02-19 PROBLEM — E88.81 INSULIN RESISTANCE: Status: RESOLVED | Noted: 2021-09-24 | Resolved: 2023-02-19

## 2023-02-19 PROBLEM — R63.5 WEIGHT GAIN: Status: RESOLVED | Noted: 2021-09-24 | Resolved: 2023-02-19

## 2023-02-19 PROBLEM — I70.0 ATHEROSCLEROSIS OF ABDOMINAL AORTA: Status: ACTIVE | Noted: 2023-02-19
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
## 2023-02-19 PROBLEM — M85.88 OSTEOPENIA OF LUMBAR SPINE: Status: ACTIVE | Noted: 2023-02-19

## 2023-02-19 PROBLEM — J02.9 ACUTE PHARYNGITIS: Status: RESOLVED | Noted: 2022-04-28 | Resolved: 2023-02-19

## 2023-02-19 PROBLEM — K92.1 HEMATOCHEZIA: Status: RESOLVED | Noted: 2021-05-02 | Resolved: 2023-02-19

## 2023-02-19 PROBLEM — J98.6 ELEVATED HEMIDIAPHRAGM: Status: ACTIVE | Noted: 2023-02-19

## 2023-02-19 PROBLEM — E66.01 MORBID OBESITY WITH BMI OF 45.0-49.9, ADULT (HCC): Status: RESOLVED | Noted: 2017-09-28 | Resolved: 2023-02-19

## 2023-02-19 PROBLEM — E88.819 INSULIN RESISTANCE: Status: RESOLVED | Noted: 2021-09-24 | Resolved: 2023-02-19

## 2023-02-19 PROBLEM — E66.813 CLASS 3 SEVERE OBESITY DUE TO EXCESS CALORIES WITH SERIOUS COMORBIDITY AND BODY MASS INDEX (BMI) OF 40.0 TO 44.9 IN ADULT: Status: ACTIVE | Noted: 2017-09-28

## 2023-02-19 PROBLEM — I10 PRIMARY HYPERTENSION: Status: ACTIVE | Noted: 2021-09-24

## 2023-02-19 PROBLEM — E66.01 CLASS 3 SEVERE OBESITY DUE TO EXCESS CALORIES WITH SERIOUS COMORBIDITY AND BODY MASS INDEX (BMI) OF 40.0 TO 44.9 IN ADULT (HCC): Status: ACTIVE | Noted: 2017-09-28

## 2023-02-19 PROBLEM — F33.9 DEPRESSION, RECURRENT (HCC): Status: ACTIVE | Noted: 2023-02-19

## 2023-02-19 PROBLEM — Z78.0 MENOPAUSE: Status: RESOLVED | Noted: 2021-05-02 | Resolved: 2023-02-19

## 2023-02-19 PROBLEM — N12 PYELONEPHRITIS: Status: RESOLVED | Noted: 2021-10-08 | Resolved: 2023-02-19

## 2023-02-19 PROBLEM — E78.5 DYSLIPIDEMIA: Status: RESOLVED | Noted: 2021-09-24 | Resolved: 2023-02-19

## 2023-02-19 PROBLEM — I70.0 ATHEROSCLEROSIS OF ABDOMINAL AORTA (HCC): Status: ACTIVE | Noted: 2023-02-19

## 2023-02-19 PROBLEM — E66.813 CLASS 3 SEVERE OBESITY DUE TO EXCESS CALORIES WITH SERIOUS COMORBIDITY AND BODY MASS INDEX (BMI) OF 40.0 TO 44.9 IN ADULT (HCC): Status: ACTIVE | Noted: 2017-09-28

## 2023-02-19 PROBLEM — F33.9 DEPRESSION, RECURRENT: Status: ACTIVE | Noted: 2023-02-19

## 2023-03-10 ENCOUNTER — LAB ENCOUNTER (OUTPATIENT)
Dept: LAB | Age: 68
End: 2023-03-10
Attending: INTERNAL MEDICINE
Payer: MEDICARE

## 2023-03-10 DIAGNOSIS — R73.03 PREDIABETES: ICD-10-CM

## 2023-03-10 DIAGNOSIS — E03.8 SUBCLINICAL HYPOTHYROIDISM: ICD-10-CM

## 2023-03-10 DIAGNOSIS — E55.9 VITAMIN D DEFICIENCY: ICD-10-CM

## 2023-03-10 DIAGNOSIS — E78.2 MIXED HYPERLIPIDEMIA: ICD-10-CM

## 2023-03-10 LAB
ALBUMIN SERPL-MCNC: 3.6 G/DL (ref 3.4–5)
ALBUMIN/GLOB SERPL: 0.9 {RATIO} (ref 1–2)
ALP LIVER SERPL-CCNC: 79 U/L
ALT SERPL-CCNC: 25 U/L
ANION GAP SERPL CALC-SCNC: 5 MMOL/L (ref 0–18)
AST SERPL-CCNC: 16 U/L (ref 15–37)
BILIRUB SERPL-MCNC: 0.6 MG/DL (ref 0.1–2)
BUN BLD-MCNC: 14 MG/DL (ref 7–18)
BUN/CREAT SERPL: 15.4 (ref 10–20)
CALCIUM BLD-MCNC: 9.6 MG/DL (ref 8.5–10.1)
CHLORIDE SERPL-SCNC: 104 MMOL/L (ref 98–112)
CHOLEST SERPL-MCNC: 184 MG/DL (ref ?–200)
CO2 SERPL-SCNC: 28 MMOL/L (ref 21–32)
CREAT BLD-MCNC: 0.91 MG/DL
EST. AVERAGE GLUCOSE BLD GHB EST-MCNC: 117 MG/DL (ref 68–126)
FASTING PATIENT LIPID ANSWER: YES
FASTING STATUS PATIENT QL REPORTED: YES
GFR SERPLBLD BASED ON 1.73 SQ M-ARVRAT: 69 ML/MIN/1.73M2 (ref 60–?)
GLOBULIN PLAS-MCNC: 4.1 G/DL (ref 2.8–4.4)
GLUCOSE BLD-MCNC: 96 MG/DL (ref 70–99)
HBA1C MFR BLD: 5.7 % (ref ?–5.7)
HDLC SERPL-MCNC: 37 MG/DL (ref 40–59)
LDLC SERPL CALC-MCNC: 108 MG/DL (ref ?–100)
NONHDLC SERPL-MCNC: 147 MG/DL (ref ?–130)
OSMOLALITY SERPL CALC.SUM OF ELEC: 284 MOSM/KG (ref 275–295)
POTASSIUM SERPL-SCNC: 3.9 MMOL/L (ref 3.5–5.1)
PROT SERPL-MCNC: 7.7 G/DL (ref 6.4–8.2)
SODIUM SERPL-SCNC: 137 MMOL/L (ref 136–145)
T3FREE SERPL-MCNC: 2.13 PG/ML (ref 2.4–4.2)
T4 FREE SERPL-MCNC: 1 NG/DL (ref 0.8–1.7)
TRIGL SERPL-MCNC: 223 MG/DL (ref 30–149)
TSI SER-ACNC: 5.08 MIU/ML (ref 0.36–3.74)
VIT D+METAB SERPL-MCNC: 14 NG/ML (ref 30–100)
VLDLC SERPL CALC-MCNC: 38 MG/DL (ref 0–30)

## 2023-03-10 PROCEDURE — 80053 COMPREHEN METABOLIC PANEL: CPT

## 2023-03-10 PROCEDURE — 84439 ASSAY OF FREE THYROXINE: CPT

## 2023-03-10 PROCEDURE — 84443 ASSAY THYROID STIM HORMONE: CPT

## 2023-03-10 PROCEDURE — 84481 FREE ASSAY (FT-3): CPT

## 2023-03-10 PROCEDURE — 83036 HEMOGLOBIN GLYCOSYLATED A1C: CPT

## 2023-03-10 PROCEDURE — 80061 LIPID PANEL: CPT

## 2023-03-10 PROCEDURE — 82306 VITAMIN D 25 HYDROXY: CPT

## 2023-03-10 PROCEDURE — 36415 COLL VENOUS BLD VENIPUNCTURE: CPT

## 2023-03-12 ENCOUNTER — TELEPHONE (OUTPATIENT)
Dept: INTERNAL MEDICINE CLINIC | Facility: CLINIC | Age: 68
End: 2023-03-12

## 2023-03-12 DIAGNOSIS — E78.2 MIXED HYPERLIPIDEMIA: ICD-10-CM

## 2023-03-12 DIAGNOSIS — E55.9 VITAMIN D DEFICIENCY: ICD-10-CM

## 2023-03-12 DIAGNOSIS — R73.03 PREDIABETES: Primary | ICD-10-CM

## 2023-03-12 DIAGNOSIS — E03.8 SUBCLINICAL HYPOTHYROIDISM: ICD-10-CM

## 2023-03-12 RX ORDER — ATORVASTATIN CALCIUM 20 MG/1
20 TABLET, FILM COATED ORAL NIGHTLY
Qty: 90 TABLET | Refills: 0 | Status: SHIPPED | OUTPATIENT
Start: 2023-03-12

## 2023-03-12 RX ORDER — ERGOCALCIFEROL 1.25 MG/1
50000 CAPSULE ORAL WEEKLY
Qty: 12 CAPSULE | Refills: 0 | Status: SHIPPED | OUTPATIENT
Start: 2023-03-12 | End: 2023-05-29

## 2023-04-25 ENCOUNTER — NURSE TRIAGE (OUTPATIENT)
Dept: INTERNAL MEDICINE CLINIC | Facility: CLINIC | Age: 68
End: 2023-04-25

## 2023-04-25 ENCOUNTER — OFFICE VISIT (OUTPATIENT)
Dept: INTERNAL MEDICINE CLINIC | Facility: CLINIC | Age: 68
End: 2023-04-25

## 2023-04-25 VITALS
BODY MASS INDEX: 41.27 KG/M2 | WEIGHT: 247.69 LBS | OXYGEN SATURATION: 96 % | HEART RATE: 58 BPM | DIASTOLIC BLOOD PRESSURE: 84 MMHG | SYSTOLIC BLOOD PRESSURE: 136 MMHG | HEIGHT: 65 IN | TEMPERATURE: 98 F

## 2023-04-25 DIAGNOSIS — L04.0: Primary | ICD-10-CM

## 2023-04-25 PROCEDURE — 1125F AMNT PAIN NOTED PAIN PRSNT: CPT | Performed by: INTERNAL MEDICINE

## 2023-04-25 PROCEDURE — 99213 OFFICE O/P EST LOW 20 MIN: CPT | Performed by: INTERNAL MEDICINE

## 2023-04-25 RX ORDER — AMOXICILLIN AND CLAVULANATE POTASSIUM 875; 125 MG/1; MG/1
1 TABLET, FILM COATED ORAL 2 TIMES DAILY
Qty: 14 TABLET | Refills: 0 | Status: SHIPPED | OUTPATIENT
Start: 2023-04-25 | End: 2023-05-02

## 2023-05-12 ENCOUNTER — OFFICE VISIT (OUTPATIENT)
Dept: INTERNAL MEDICINE CLINIC | Facility: CLINIC | Age: 68
End: 2023-05-12

## 2023-05-12 VITALS
HEART RATE: 64 BPM | TEMPERATURE: 98 F | BODY MASS INDEX: 41.01 KG/M2 | HEIGHT: 65 IN | WEIGHT: 246.13 LBS | DIASTOLIC BLOOD PRESSURE: 78 MMHG | SYSTOLIC BLOOD PRESSURE: 130 MMHG | OXYGEN SATURATION: 97 %

## 2023-05-12 DIAGNOSIS — L04.0: Primary | ICD-10-CM

## 2023-05-12 PROCEDURE — 99212 OFFICE O/P EST SF 10 MIN: CPT | Performed by: INTERNAL MEDICINE

## 2023-05-12 PROCEDURE — 1126F AMNT PAIN NOTED NONE PRSNT: CPT | Performed by: INTERNAL MEDICINE

## 2023-06-01 ENCOUNTER — HOSPITAL ENCOUNTER (OUTPATIENT)
Dept: MAMMOGRAPHY | Age: 68
Discharge: HOME | End: 2023-06-01
Attending: INTERNAL MEDICINE
Payer: MEDICARE

## 2023-06-01 DIAGNOSIS — Z12.31 ENCOUNTER FOR SCREENING MAMMOGRAM FOR BREAST CANCER: ICD-10-CM

## 2023-06-01 PROCEDURE — 77063 BREAST TOMOSYNTHESIS BI: CPT | Performed by: INTERNAL MEDICINE

## 2023-06-01 PROCEDURE — 77067 SCR MAMMO BI INCL CAD: CPT | Performed by: INTERNAL MEDICINE

## 2023-06-20 ENCOUNTER — OFFICE VISIT (OUTPATIENT)
Dept: INTERNAL MEDICINE CLINIC | Facility: CLINIC | Age: 68
End: 2023-06-20

## 2023-06-20 VITALS
HEIGHT: 65 IN | SYSTOLIC BLOOD PRESSURE: 133 MMHG | OXYGEN SATURATION: 95 % | HEART RATE: 68 BPM | BODY MASS INDEX: 42.26 KG/M2 | WEIGHT: 253.63 LBS | TEMPERATURE: 98 F | DIASTOLIC BLOOD PRESSURE: 73 MMHG

## 2023-06-20 DIAGNOSIS — R30.0 DYSURIA: Primary | ICD-10-CM

## 2023-06-20 DIAGNOSIS — L04.0: ICD-10-CM

## 2023-06-20 DIAGNOSIS — M54.2 NECK PAIN: ICD-10-CM

## 2023-06-20 LAB
APPEARANCE: CLEAR
BILIRUBIN: NEGATIVE
GLUCOSE (URINE DIPSTICK): NEGATIVE MG/DL
KETONES (URINE DIPSTICK): NEGATIVE MG/DL
MULTISTIX LOT#: ABNORMAL NUMERIC
NITRITE, URINE: NEGATIVE
OCCULT BLOOD: NEGATIVE
PH, URINE: 6.5 (ref 4.5–8)
PROTEIN (URINE DIPSTICK): NEGATIVE MG/DL
SPECIFIC GRAVITY: 1.02 (ref 1–1.03)
URINE-COLOR: YELLOW
UROBILINOGEN,SEMI-QN: 0.2 MG/DL (ref 0–1.9)

## 2023-06-20 PROCEDURE — 81002 URINALYSIS NONAUTO W/O SCOPE: CPT | Performed by: INTERNAL MEDICINE

## 2023-06-20 PROCEDURE — 1125F AMNT PAIN NOTED PAIN PRSNT: CPT | Performed by: INTERNAL MEDICINE

## 2023-06-20 PROCEDURE — 99213 OFFICE O/P EST LOW 20 MIN: CPT | Performed by: INTERNAL MEDICINE

## 2023-06-20 RX ORDER — CEPHALEXIN 500 MG/1
500 CAPSULE ORAL 2 TIMES DAILY
Qty: 14 CAPSULE | Refills: 0 | Status: SHIPPED | OUTPATIENT
Start: 2023-06-20

## 2023-07-18 ENCOUNTER — OFFICE VISIT (OUTPATIENT)
Dept: INTERNAL MEDICINE CLINIC | Facility: CLINIC | Age: 68
End: 2023-07-18

## 2023-07-18 VITALS
TEMPERATURE: 98 F | HEIGHT: 65 IN | DIASTOLIC BLOOD PRESSURE: 68 MMHG | BODY MASS INDEX: 41.7 KG/M2 | WEIGHT: 250.31 LBS | OXYGEN SATURATION: 96 % | HEART RATE: 76 BPM | SYSTOLIC BLOOD PRESSURE: 128 MMHG

## 2023-07-18 DIAGNOSIS — L30.9 DERMATITIS: Primary | ICD-10-CM

## 2023-07-18 PROCEDURE — 1126F AMNT PAIN NOTED NONE PRSNT: CPT | Performed by: INTERNAL MEDICINE

## 2023-07-18 PROCEDURE — 99213 OFFICE O/P EST LOW 20 MIN: CPT | Performed by: INTERNAL MEDICINE

## 2023-07-18 RX ORDER — SYRINGE WITH NEEDLE, 1 ML 25GX5/8"
SYRINGE, EMPTY DISPOSABLE MISCELLANEOUS
Qty: 24 EACH | Refills: 2 | Status: SHIPPED | OUTPATIENT
Start: 2023-07-18

## 2023-07-18 RX ORDER — CYANOCOBALAMIN 1000 UG/ML
INJECTION, SOLUTION INTRAMUSCULAR; SUBCUTANEOUS
Qty: 24 ML | Refills: 1 | Status: SHIPPED | OUTPATIENT
Start: 2023-07-18

## 2023-07-18 RX ORDER — TRIAMCINOLONE ACETONIDE 1 MG/G
CREAM TOPICAL
Qty: 15 G | Refills: 0 | Status: SHIPPED | OUTPATIENT
Start: 2023-07-18

## 2023-07-18 NOTE — PROGRESS NOTES
Subjective:   Patient ID: Rudy Esquivel is a 80year old female. She complained of having discoloration of right elbow sarted about 3 days ago. She said it was itchy and painful with dryness noted. History/Other:   Review of Systems   Constitutional: Negative. Skin:  Positive for rash. Current Outpatient Medications   Medication Sig Dispense Refill    cyanocobalamin 1000 MCG/ML Injection Solution INJECT 1 ML INTO THE MUSCLE EVERY 15 DAYS 24 mL 1    Syringe/Needle, Disp, (BD LUER-MARGARITO SYRINGE) 25G X 1\" 3 ML Does not apply Misc USE TWICE A MONTH AS DIRECTED 24 each 2    atorvastatin 20 MG Oral Tab Take 1 tablet (20 mg total) by mouth nightly. 90 tablet 0    cloNIDine 0.1 MG Oral Tab Take one tab po daily 30 tablet 0    furosemide 20 MG Oral Tab Take 1 tablet (20 mg total) by mouth daily as needed. 30 tablet 0    Potassium Chloride ER 10 MEQ Oral Tab CR Take 1 tablet (10 mEq total) by mouth daily as needed. 30 tablet 0    Homeopathic Products (IRRITATED EYE RELIEF OP) Apply to eye. cephalexin 500 MG Oral Cap Take 1 capsule (500 mg total) by mouth 2 (two) times daily. 14 capsule 0    Irbesartan-hydroCHLOROthiazide (AVALIDE) 150-12.5 MG Oral Tab Take 1 tablet by mouth daily. (Patient not taking: Reported on 4/25/2023) 30 tablet 1    Fluticasone Propionate 50 MCG/ACT Nasal Suspension SHAKE LIQUID AND USE 2 SPRAYS IN EACH NOSTRIL DAILY (Patient taking differently: 2 sprays by Nasal route daily as needed. SHAKE LIQUID AND USE 2 SPRAYS IN EACH NOSTRIL DAILY) 1 Inhaler 2     Allergies:  Ciprofloxacin           HIVES    Objective:   Physical Exam  Constitutional:       General: She is not in acute distress. Appearance: She is not ill-appearing, toxic-appearing or diaphoretic. Pulmonary:      Effort: Pulmonary effort is normal. No respiratory distress. Skin:     Coloration: Skin is not jaundiced or pale.              Comments: Scaly plaque about 1cm x2cm in size on the right elbow; no hot nor tender   Neurological:      Mental Status: She is alert. Assessment & Plan:   (L30.9) Dermatitis  (primary encounter diagnosis)  Plan: d/w pt, trial of triamcinolone cream for next 7 days; call back if persist/worsens. No orders of the defined types were placed in this encounter.       Meds This Visit:  Requested Prescriptions     Signed Prescriptions Disp Refills    cyanocobalamin 1000 MCG/ML Injection Solution 24 mL 1     Sig: INJECT 1 ML INTO THE MUSCLE EVERY 15 DAYS    Syringe/Needle, Disp, (BD LUER-MARGARITO SYRINGE) 25G X 1\" 3 ML Does not apply Misc 24 each 2     Sig: USE TWICE A MONTH AS DIRECTED       Imaging & Referrals:  None

## 2023-10-05 NOTE — TELEPHONE ENCOUNTER
- Still having occasional headaches. Follow up if not resolving in the next 4-6 weeks   The order for her urinalysis already in epic. I will add amlodipine 5mg po daily to her bp med; erx sent.

## 2023-11-28 ENCOUNTER — MED REC SCAN ONLY (OUTPATIENT)
Dept: INTERNAL MEDICINE CLINIC | Facility: CLINIC | Age: 68
End: 2023-11-28

## 2024-01-26 ENCOUNTER — NURSE TRIAGE (OUTPATIENT)
Dept: INTERNAL MEDICINE CLINIC | Facility: CLINIC | Age: 69
End: 2024-01-26

## 2024-01-26 ENCOUNTER — OFFICE VISIT (OUTPATIENT)
Dept: INTERNAL MEDICINE CLINIC | Facility: CLINIC | Age: 69
End: 2024-01-26
Payer: MEDICARE

## 2024-01-26 VITALS
HEART RATE: 65 BPM | TEMPERATURE: 97 F | WEIGHT: 248.69 LBS | DIASTOLIC BLOOD PRESSURE: 75 MMHG | SYSTOLIC BLOOD PRESSURE: 118 MMHG | HEIGHT: 65 IN | OXYGEN SATURATION: 97 % | BODY MASS INDEX: 41.43 KG/M2

## 2024-01-26 DIAGNOSIS — J01.90 ACUTE NON-RECURRENT SINUSITIS, UNSPECIFIED LOCATION: Primary | ICD-10-CM

## 2024-01-26 PROCEDURE — 1125F AMNT PAIN NOTED PAIN PRSNT: CPT | Performed by: INTERNAL MEDICINE

## 2024-01-26 PROCEDURE — 99213 OFFICE O/P EST LOW 20 MIN: CPT | Performed by: INTERNAL MEDICINE

## 2024-01-26 RX ORDER — DOXYCYCLINE 100 MG/1
100 TABLET ORAL 2 TIMES DAILY
Qty: 14 TABLET | Refills: 0 | Status: SHIPPED | OUTPATIENT
Start: 2024-01-26

## 2024-01-26 RX ORDER — FLUTICASONE PROPIONATE 50 MCG
SPRAY, SUSPENSION (ML) NASAL
Qty: 1 EACH | Refills: 2 | Status: SHIPPED | OUTPATIENT
Start: 2024-01-26

## 2024-01-26 NOTE — TELEPHONE ENCOUNTER
Spoke directly with PCP who states \"have her come in at 4pm today\"  Contacted patient (name and  of patient verified). Appointment scheduled:  Future Appointments   Date Time Provider Department Center   2024  3:45 PM Christos Shore MD [facility]

## 2024-01-26 NOTE — TELEPHONE ENCOUNTER
Action Requested: Summary for Provider     []  Critical Lab, Recommendations Needed  [x] Need Additional Advice  []   FYI    []   Need Orders  [] Need Medications Sent to Pharmacy  []  Other     SUMMARY: Per protocol disposition advised Go to office now. Patient states she only wants to see Dr. Shore, can be at office in 15 minutes. Informed her his schedule is ending soon as it is the end of the day. Patient insists on message to PCP    Dr. Shore, please advise on add-on appointment for today. Thank you.    Reason for call: Acute  Onset: Sunday    Symptoms: sinus headache, hoarse voice, runny nose, post nasal drip with cough  Covid home test negative 1/24/24  Treatment: sudafed, tylenol  Just putting head down makes headache worse, rating pain 6/10  Denies: fever, and protocol questions marked with \"no\"    Reason for Disposition   Severe headache    Protocols used: Sinus Pain and Congestion-A-[place]

## 2024-01-26 NOTE — PROGRESS NOTES
Subjective:     Patient ID: Qing Miranda is a 68 year old female.    Cough  This is a new problem. The current episode started in the past 7 days. The problem has been unchanged. The problem occurs hourly. The cough is Productive of sputum and productive of purulent sputum. Associated symptoms include headaches, nasal congestion, postnasal drip, rhinorrhea and a sore throat. Pertinent negatives include no chest pain, ear pain, shortness of breath or wheezing. Nothing aggravates the symptoms. Risk factors: no covid exposure. She has tried OTC cough suppressant and rest (sudafed) for the symptoms. The treatment provided no relief.       History/Other:   Review of Systems   Constitutional: Negative.  Negative for fatigue.   HENT:  Positive for congestion, postnasal drip, rhinorrhea, sinus pain and sore throat. Negative for ear pain.    Respiratory:  Positive for cough. Negative for shortness of breath and wheezing.         Yellow phlegm   Cardiovascular:  Negative for chest pain.   Gastrointestinal: Negative.    Neurological:  Positive for headaches.     Current Outpatient Medications   Medication Sig Dispense Refill    cyanocobalamin 1000 MCG/ML Injection Solution INJECT 1 ML INTO THE MUSCLE EVERY 15 DAYS 24 mL 1    atorvastatin 20 MG Oral Tab Take 1 tablet (20 mg total) by mouth nightly. 90 tablet 0    furosemide 20 MG Oral Tab Take 1 tablet (20 mg total) by mouth daily as needed. 30 tablet 0    Potassium Chloride ER 10 MEQ Oral Tab CR Take 1 tablet (10 mEq total) by mouth daily as needed. 30 tablet 0    Homeopathic Products (IRRITATED EYE RELIEF OP) Apply to eye.      Syringe/Needle, Disp, (BD LUER-MARGARITO SYRINGE) 25G X 1\" 3 ML Does not apply Misc USE TWICE A MONTH AS DIRECTED 24 each 2    triamcinolone 0.1 % External Cream Apply rash on elbow bid (Patient not taking: Reported on 1/26/2024) 15 g 0    cephalexin 500 MG Oral Cap Take 1 capsule (500 mg total) by mouth 2 (two) times daily. 14 capsule 0     Irbesartan-hydroCHLOROthiazide (AVALIDE) 150-12.5 MG Oral Tab Take 1 tablet by mouth daily. (Patient not taking: Reported on 2023) 30 tablet 1    cloNIDine 0.1 MG Oral Tab Take one tab po daily (Patient not taking: Reported on 2024) 30 tablet 0    Fluticasone Propionate 50 MCG/ACT Nasal Suspension SHAKE LIQUID AND USE 2 SPRAYS IN EACH NOSTRIL DAILY (Patient taking differently: 2 sprays by Nasal route daily as needed. SHAKE LIQUID AND USE 2 SPRAYS IN EACH NOSTRIL DAILY) 1 Inhaler 2     Allergies:  Allergies   Allergen Reactions    Ciprofloxacin HIVES       Past Medical History:   Diagnosis Date    Diverticulosis     Frequent UTI     Hemorrhoids     High cholesterol     HTN (hypertension)     Hyperlipidemia     Hypothyroidism     subclinical hypothyroidism    Macular degeneration     Morbid obesity with BMI of 50.0-59.9, adult (HCC)     Prediabetes     Pregnancy     Per [employer]:  \" 2, Para 1, Miscarriage, 1 .\"    Supraventricular tachycardia     Management:  AV node ablation     Vitamin B12 deficiency       Past Surgical History:   Procedure Laterality Date    ABLATION      AV node ablation          CHOLECYSTECTOMY      COLONOSCOPY N/A 2017    Procedure: COLONOSCOPY;  Surgeon: Vicente Bryant MD;  Location: [facility] ENDOSCOPY    HEMORRHOIDECTOMY      OTHER SURGICAL HISTORY      laparotomy- adhesiolysis of bowel      Family History   Problem Relation Age of Onset    Diabetes Mother     Heart Disorder Mother         Congestive Heart Failure    Renal Disease Mother     Other (Other) Mother     Melanoma Father     Cancer Father         Basal Cell Cancer    Diabetes Sister     Obesity Sister     Ovarian Cancer Maternal Grandmother     Other (Other) Other         No Family h    Other (Other) Other         No Family h      Social History:   Social History     Socioeconomic History    Marital status:    Tobacco Use    Smoking status: Former     Packs/day: 1.00      Years: 30.00     Additional pack years: 0.00     Total pack years: 30.00     Types: Cigarettes     Quit date: 1996     Years since quittin.0     Passive exposure: Past    Smokeless tobacco: Never    Tobacco comments:     Quit at 42 y/o   Vaping Use    Vaping Use: Never used   Substance and Sexual Activity    Alcohol use: Yes     Alcohol/week: 0.0 standard drinks of alcohol     Comment: socially    Drug use: No   Other Topics Concern    Caffeine Concern Yes     Comment: Coffee, 2 cups per week     Exercise No        Objective:   Physical Exam  Constitutional:       General: She is not in acute distress.     Appearance: She is obese. She is not ill-appearing, toxic-appearing or diaphoretic.   HENT:      Right Ear: Tympanic membrane, ear canal and external ear normal.      Left Ear: Tympanic membrane, ear canal and external ear normal.      Nose:      Right Sinus: Frontal sinus tenderness present. No maxillary sinus tenderness.      Left Sinus: Maxillary sinus tenderness and frontal sinus tenderness present.      Mouth/Throat:      Mouth: No angioedema.      Pharynx: Uvula midline. No pharyngeal swelling, oropharyngeal exudate, posterior oropharyngeal erythema or uvula swelling.   Cardiovascular:      Rate and Rhythm: Normal rate and regular rhythm.      Heart sounds: Normal heart sounds. No murmur heard.     No gallop.   Pulmonary:      Effort: Pulmonary effort is normal. No respiratory distress.      Breath sounds: Normal breath sounds. No wheezing or rales.   Abdominal:      General: Bowel sounds are normal. There is no distension.      Palpations: Abdomen is soft. There is no mass.      Tenderness: There is no abdominal tenderness. There is no guarding.   Musculoskeletal:      Cervical back: Normal range of motion and neck supple. No rigidity or tenderness.      Right lower leg: No edema.      Left lower leg: No edema.   Lymphadenopathy:      Cervical: No cervical adenopathy.   Neurological:      Mental  Status: She is alert.         Assessment & Plan:   (J01.90) Acute non-recurrent sinusitis, unspecified location  (primary encounter diagnosis)  Plan: pt given doxycycline and flonase NS. Pt told to call back if symptoms persist/worsens        No orders of the defined types were placed in this encounter.      Meds This Visit:  Requested Prescriptions      No prescriptions requested or ordered in this encounter       Imaging & Referrals:  None

## 2024-02-05 ENCOUNTER — TELEPHONE (OUTPATIENT)
Dept: INTERNAL MEDICINE CLINIC | Facility: CLINIC | Age: 69
End: 2024-02-05

## 2024-02-05 ENCOUNTER — OFFICE VISIT (OUTPATIENT)
Dept: INTERNAL MEDICINE CLINIC | Facility: CLINIC | Age: 69
End: 2024-02-05
Payer: MEDICARE

## 2024-02-05 ENCOUNTER — HOSPITAL ENCOUNTER (OUTPATIENT)
Dept: GENERAL RADIOLOGY | Age: 69
Discharge: HOME | End: 2024-02-05
Attending: INTERNAL MEDICINE
Payer: MEDICARE

## 2024-02-05 VITALS
WEIGHT: 249.63 LBS | OXYGEN SATURATION: 98 % | HEART RATE: 71 BPM | SYSTOLIC BLOOD PRESSURE: 130 MMHG | BODY MASS INDEX: 41.59 KG/M2 | TEMPERATURE: 98 F | DIASTOLIC BLOOD PRESSURE: 76 MMHG | HEIGHT: 65 IN

## 2024-02-05 DIAGNOSIS — R05.1 ACUTE COUGH: Primary | ICD-10-CM

## 2024-02-05 DIAGNOSIS — R05.1 ACUTE COUGH: ICD-10-CM

## 2024-02-05 PROCEDURE — 99213 OFFICE O/P EST LOW 20 MIN: CPT | Performed by: INTERNAL MEDICINE

## 2024-02-05 PROCEDURE — 71046 X-RAY EXAM CHEST 2 VIEWS: CPT | Performed by: INTERNAL MEDICINE

## 2024-02-05 RX ORDER — BENZONATATE 100 MG/1
100 CAPSULE ORAL 3 TIMES DAILY PRN
Qty: 30 CAPSULE | Refills: 0 | Status: SHIPPED | OUTPATIENT
Start: 2024-02-05

## 2024-02-05 RX ORDER — AMOXICILLIN AND CLAVULANATE POTASSIUM 875; 125 MG/1; MG/1
1 TABLET, FILM COATED ORAL 2 TIMES DAILY
Qty: 14 TABLET | Refills: 0 | Status: SHIPPED | OUTPATIENT
Start: 2024-02-05 | End: 2024-02-12

## 2024-02-05 NOTE — TELEPHONE ENCOUNTER
Patient calling, was seen 1/26/24 for sinus URI symptoms.  Still with continued symptoms, took ABT. No getting better, headache continues. Coughing \"a lot\" light yellow discharge. No SOB or Wheezing. Taking OTC Sudafed cold meds, not helpful. Scratchy voice. Doing frequent steamy showers.      Offered with partner today for eval but she declined saying she will only see Dr. Shore. She would like to be seen today if possible.

## 2024-02-05 NOTE — PROGRESS NOTES
Subjective:     Patient ID: Qing Miranda is a 69 year old female.    Cough  This is a new problem. The current episode started 1 to 4 weeks ago (2 weeks). The problem has been waxing and waning. The problem occurs hourly. The cough is Productive of sputum and productive of purulent sputum. Associated symptoms include headaches, nasal congestion, postnasal drip, rhinorrhea, a sore throat, shortness of breath and wheezing. Pertinent negatives include no chest pain, ear pain, fever or hemoptysis. Associated symptoms comments: Back pain. Nothing aggravates the symptoms. She has tried rest and OTC cough suppressant (abx doxycycline) for the symptoms. The treatment provided mild relief.       History/Other:   Review of Systems   Constitutional:  Negative for fever.   HENT:  Positive for congestion, postnasal drip, rhinorrhea, sinus pressure, sinus pain and sore throat. Negative for ear pain.    Respiratory:  Positive for cough, shortness of breath and wheezing. Negative for hemoptysis.    Cardiovascular:  Negative for chest pain.   Gastrointestinal: Negative.    Neurological:  Positive for headaches.     Current Outpatient Medications   Medication Sig Dispense Refill    fluticasone propionate 50 MCG/ACT Nasal Suspension SHAKE LIQUID AND USE 2 SPRAYS IN EACH NOSTRIL DAILY 1 each 2    cyanocobalamin 1000 MCG/ML Injection Solution INJECT 1 ML INTO THE MUSCLE EVERY 15 DAYS 24 mL 1    atorvastatin 20 MG Oral Tab Take 1 tablet (20 mg total) by mouth nightly. 90 tablet 0    furosemide 20 MG Oral Tab Take 1 tablet (20 mg total) by mouth daily as needed. 30 tablet 0    Potassium Chloride ER 10 MEQ Oral Tab CR Take 1 tablet (10 mEq total) by mouth daily as needed. 30 tablet 0    Homeopathic Products (IRRITATED EYE RELIEF OP) Apply to eye.      Doxycycline Monohydrate 100 MG Oral Tab Take 100 mg by mouth 2 (two) times daily. 14 tablet 0    Syringe/Needle, Disp, (BD LUER-MARGARITO SYRINGE) 25G X 1\" 3 ML Does not apply Misc USE TWICE  A MONTH AS DIRECTED 24 each 2    triamcinolone 0.1 % External Cream Apply rash on elbow bid (Patient not taking: Reported on 2024) 15 g 0    Irbesartan-hydroCHLOROthiazide (AVALIDE) 150-12.5 MG Oral Tab Take 1 tablet by mouth daily. (Patient not taking: Reported on 2023) 30 tablet 1    cloNIDine 0.1 MG Oral Tab Take one tab po daily (Patient not taking: Reported on 2024) 30 tablet 0     Allergies:  Allergies   Allergen Reactions    Ciprofloxacin HIVES       Past Medical History:   Diagnosis Date    Diverticulosis     Frequent UTI     Hemorrhoids     High cholesterol     HTN (hypertension)     Hyperlipidemia     Hypothyroidism     subclinical hypothyroidism    Macular degeneration     Morbid obesity with BMI of 50.0-59.9, adult (HCC)     Prediabetes     Pregnancy     Per NextGen:  \" 2, Para 1, Miscarriage, 1 .\"    Supraventricular tachycardia     Management:  AV node ablation     Vitamin B12 deficiency       Past Surgical History:   Procedure Laterality Date    ABLATION      AV node ablation          CHOLECYSTECTOMY      COLONOSCOPY N/A 2017    Procedure: COLONOSCOPY;  Surgeon: Vicente Bryant MD;  Location: [facility] ENDOSCOPY    HEMORRHOIDECTOMY      OTHER SURGICAL HISTORY      laparotomy- adhesiolysis of bowel      Family History   Problem Relation Age of Onset    Diabetes Mother     Heart Disorder Mother         Congestive Heart Failure    Renal Disease Mother     Other (Other) Mother     Melanoma Father     Cancer Father         Basal Cell Cancer    Diabetes Sister     Obesity Sister     Ovarian Cancer Maternal Grandmother     Other (Other) Other         No Family h    Other (Other) Other         No Family h      Social History:   Social History     Socioeconomic History    Marital status:    Tobacco Use    Smoking status: Former     Packs/day: 1.00     Years: 30.00     Additional pack years: 0.00     Total pack years: 30.00     Types: Cigarettes      Quit date: 1996     Years since quittin.1     Passive exposure: Past    Smokeless tobacco: Never    Tobacco comments:     Quit at 42 y/o   Vaping Use    Vaping Use: Never used   Substance and Sexual Activity    Alcohol use: Yes     Alcohol/week: 0.0 standard drinks of alcohol     Comment: socially    Drug use: No   Other Topics Concern    Caffeine Concern Yes     Comment: Coffee, 2 cups per week     Exercise No        Objective:   Physical Exam  Constitutional:       General: She is not in acute distress.     Appearance: She is well-developed. She is obese. She is not ill-appearing, toxic-appearing or diaphoretic.   HENT:      Head: Normocephalic and atraumatic.      Right Ear: Tympanic membrane, ear canal and external ear normal.      Left Ear: Tympanic membrane, ear canal and external ear normal.      Nose:      Right Sinus: Maxillary sinus tenderness and frontal sinus tenderness present.      Left Sinus: Maxillary sinus tenderness and frontal sinus tenderness present.      Mouth/Throat:      Pharynx: No oropharyngeal exudate.   Eyes:      General:         Right eye: No discharge.         Left eye: No discharge.      Conjunctiva/sclera: Conjunctivae normal.      Pupils: Pupils are equal, round, and reactive to light.   Neck:      Vascular: No JVD.   Cardiovascular:      Rate and Rhythm: Normal rate and regular rhythm.      Heart sounds: Normal heart sounds.   Pulmonary:      Effort: Pulmonary effort is normal. No respiratory distress.      Breath sounds: Normal breath sounds. No wheezing or rales.   Abdominal:      General: Bowel sounds are normal. There is no distension.      Palpations: Abdomen is soft. There is no mass.      Tenderness: There is no abdominal tenderness. There is no guarding or rebound.   Musculoskeletal:         General: No tenderness. Normal range of motion.      Cervical back: Normal range of motion and neck supple. No rigidity or tenderness.      Right lower leg: No edema.      Left  lower leg: No edema.   Lymphadenopathy:      Cervical: No cervical adenopathy.   Skin:     General: Skin is warm and dry.      Findings: No rash.   Neurological:      Mental Status: She is alert and oriented to person, place, and time.         Assessment & Plan:   (R05.1) Acute cough  (primary encounter diagnosis)  Plan: XR CHEST PA + LAT CHEST (PRZ=04541)        Will get cxr today due to her persistent productive cough and back pain. Also has sinus symptoms indicative of her sinusitis but did clear up completely with course of doxyccyline and flonase NS.      Cxr showed no acute cardiopulmonary changes so I told pt likely sinusitis didn't really clear up completely so will give her augmentin and also gave her cough med. Call back if symptoms persist/worsensl .    No orders of the defined types were placed in this encounter.      Meds This Visit:  Requested Prescriptions      No prescriptions requested or ordered in this encounter       Imaging & Referrals:  None

## 2024-02-05 NOTE — TELEPHONE ENCOUNTER
Future Appointments   Date Time Provider Department Center   2/5/2024  1:30 PM Christos Shore MD [facility]

## 2024-02-12 ENCOUNTER — HOSPITAL ENCOUNTER (EMERGENCY)
Facility: HOSPITAL | Age: 69
Discharge: HOME | End: 2024-02-12
Attending: EMERGENCY MEDICINE
Payer: MEDICARE

## 2024-02-12 VITALS
HEIGHT: 61.5 IN | WEIGHT: 250 LBS | SYSTOLIC BLOOD PRESSURE: 119 MMHG | DIASTOLIC BLOOD PRESSURE: 46 MMHG | BODY MASS INDEX: 46.6 KG/M2 | OXYGEN SATURATION: 93 % | RESPIRATION RATE: 18 BRPM | TEMPERATURE: 98 F | HEART RATE: 59 BPM

## 2024-02-12 DIAGNOSIS — N30.00 ACUTE CYSTITIS WITHOUT HEMATURIA: ICD-10-CM

## 2024-02-12 DIAGNOSIS — U07.1 COVID-19: Primary | ICD-10-CM

## 2024-02-12 LAB
ALBUMIN SERPL-MCNC: 4.2 G/DL (ref 3.2–4.8)
ALBUMIN/GLOB SERPL: 1.5 {RATIO} (ref 1–2)
ALP LIVER SERPL-CCNC: 99 U/L
ALT SERPL-CCNC: 9 U/L
ANION GAP SERPL CALC-SCNC: 7 MMOL/L (ref 0–18)
AST SERPL-CCNC: 13 U/L (ref ?–34)
BASOPHILS # BLD AUTO: 0.02 X10(3) UL (ref 0–0.2)
BASOPHILS NFR BLD AUTO: 0.3 %
BILIRUB SERPL-MCNC: 0.3 MG/DL (ref 0.2–1.1)
BILIRUB UR QL: NEGATIVE
BUN BLD-MCNC: 17 MG/DL (ref 9–23)
BUN/CREAT SERPL: 22.7 (ref 10–20)
CALCIUM BLD-MCNC: 9.1 MG/DL (ref 8.7–10.4)
CHLORIDE SERPL-SCNC: 105 MMOL/L (ref 98–112)
CO2 SERPL-SCNC: 25 MMOL/L (ref 21–32)
COLOR UR: YELLOW
CREAT BLD-MCNC: 0.75 MG/DL
DEPRECATED RDW RBC AUTO: 42.5 FL (ref 35.1–46.3)
EGFRCR SERPLBLD CKD-EPI 2021: 87 ML/MIN/1.73M2 (ref 60–?)
EOSINOPHIL # BLD AUTO: 0.2 X10(3) UL (ref 0–0.7)
EOSINOPHIL NFR BLD AUTO: 2.7 %
ERYTHROCYTE [DISTWIDTH] IN BLOOD BY AUTOMATED COUNT: 13.7 % (ref 11–15)
FLUAV + FLUBV RNA SPEC NAA+PROBE: NEGATIVE
FLUAV + FLUBV RNA SPEC NAA+PROBE: NEGATIVE
GLOBULIN PLAS-MCNC: 2.8 G/DL (ref 2.8–4.4)
GLUCOSE BLD-MCNC: 102 MG/DL (ref 70–99)
GLUCOSE UR-MCNC: NORMAL MG/DL
HCT VFR BLD AUTO: 36.9 %
HGB BLD-MCNC: 11.7 G/DL
HGB UR QL STRIP.AUTO: NEGATIVE
IMM GRANULOCYTES # BLD AUTO: 0.03 X10(3) UL (ref 0–1)
IMM GRANULOCYTES NFR BLD: 0.4 %
KETONES UR-MCNC: NEGATIVE MG/DL
LEUKOCYTE ESTERASE UR QL STRIP.AUTO: 500
LYMPHOCYTES # BLD AUTO: 1.37 X10(3) UL (ref 1–4)
LYMPHOCYTES NFR BLD AUTO: 18.5 %
MCH RBC QN AUTO: 26.8 PG (ref 26–34)
MCHC RBC AUTO-ENTMCNC: 31.7 G/DL (ref 31–37)
MCV RBC AUTO: 84.4 FL
MONOCYTES # BLD AUTO: 0.8 X10(3) UL (ref 0.1–1)
MONOCYTES NFR BLD AUTO: 10.8 %
NEUTROPHILS # BLD AUTO: 4.98 X10 (3) UL (ref 1.5–7.7)
NEUTROPHILS # BLD AUTO: 4.98 X10(3) UL (ref 1.5–7.7)
NEUTROPHILS NFR BLD AUTO: 67.3 %
NITRITE UR QL STRIP.AUTO: NEGATIVE
OSMOLALITY SERPL CALC.SUM OF ELEC: 286 MOSM/KG (ref 275–295)
PH UR: 5 [PH] (ref 5–8)
PLATELET # BLD AUTO: 359 10(3)UL (ref 150–450)
POTASSIUM SERPL-SCNC: 4 MMOL/L (ref 3.5–5.1)
PROT SERPL-MCNC: 7 G/DL (ref 5.7–8.2)
RBC # BLD AUTO: 4.37 X10(6)UL
RSV RNA SPEC NAA+PROBE: NEGATIVE
SARS-COV-2 RNA RESP QL NAA+PROBE: DETECTED
SODIUM SERPL-SCNC: 137 MMOL/L (ref 136–145)
SP GR UR STRIP: 1.03 (ref 1–1.03)
UROBILINOGEN UR STRIP-ACNC: NORMAL
WBC # BLD AUTO: 7.4 X10(3) UL (ref 4–11)

## 2024-02-12 PROCEDURE — 80053 COMPREHEN METABOLIC PANEL: CPT

## 2024-02-12 PROCEDURE — 87186 SC STD MICRODIL/AGAR DIL: CPT | Performed by: EMERGENCY MEDICINE

## 2024-02-12 PROCEDURE — 0241U SARS-COV-2/FLU A AND B/RSV BY PCR (GENEXPERT): CPT

## 2024-02-12 PROCEDURE — 85025 COMPLETE CBC W/AUTO DIFF WBC: CPT | Performed by: EMERGENCY MEDICINE

## 2024-02-12 PROCEDURE — 99283 EMERGENCY DEPT VISIT LOW MDM: CPT

## 2024-02-12 PROCEDURE — 81001 URINALYSIS AUTO W/SCOPE: CPT | Performed by: EMERGENCY MEDICINE

## 2024-02-12 PROCEDURE — 99284 EMERGENCY DEPT VISIT MOD MDM: CPT

## 2024-02-12 PROCEDURE — 87086 URINE CULTURE/COLONY COUNT: CPT | Performed by: EMERGENCY MEDICINE

## 2024-02-12 PROCEDURE — 80053 COMPREHEN METABOLIC PANEL: CPT | Performed by: EMERGENCY MEDICINE

## 2024-02-12 PROCEDURE — 85025 COMPLETE CBC W/AUTO DIFF WBC: CPT

## 2024-02-12 PROCEDURE — 0241U SARS-COV-2/FLU A AND B/RSV BY PCR (GENEXPERT): CPT | Performed by: EMERGENCY MEDICINE

## 2024-02-12 PROCEDURE — 36415 COLL VENOUS BLD VENIPUNCTURE: CPT

## 2024-02-12 PROCEDURE — 87077 CULTURE AEROBIC IDENTIFY: CPT | Performed by: EMERGENCY MEDICINE

## 2024-02-12 RX ORDER — NITROFURANTOIN 25; 75 MG/1; MG/1
100 CAPSULE ORAL 2 TIMES DAILY
Qty: 20 CAPSULE | Refills: 0 | Status: SHIPPED | OUTPATIENT
Start: 2024-02-12 | End: 2024-02-22

## 2024-02-13 ENCOUNTER — TELEPHONE (OUTPATIENT)
Dept: INTERNAL MEDICINE CLINIC | Facility: CLINIC | Age: 69
End: 2024-02-13

## 2024-02-13 NOTE — ED PROVIDER NOTES
Patient Seen in: [facility]      History     Chief Complaint   Patient presents with    Urinary Symptoms     Stated Complaint: urinary symptom,fever    Subjective:   HPI        Objective:   Past Medical History:   Diagnosis Date    Diverticulosis     Frequent UTI     Hemorrhoids     High cholesterol     HTN (hypertension)     Hyperlipidemia     Hypothyroidism     subclinical hypothyroidism    Macular degeneration     Morbid obesity with BMI of 50.0-59.9, adult (HCC)     Prediabetes     Pregnancy     Per NextGen:  \" 2, Para 1, Miscarriage, 1 .\"    Supraventricular tachycardia     Management:  AV node ablation     Vitamin B12 deficiency               Past Surgical History:   Procedure Laterality Date    ABLATION      AV node ablation          CHOLECYSTECTOMY      COLONOSCOPY N/A 2017    Procedure: COLONOSCOPY;  Surgeon: Vicente Bryant MD;  Location: [facility] ENDOSCOPY    HEMORRHOIDECTOMY      OTHER SURGICAL HISTORY      laparotomy- adhesiolysis of bowel                Social History     Socioeconomic History    Marital status:    Tobacco Use    Smoking status: Former     Packs/day: 1.00     Years: 30.00     Additional pack years: 0.00     Total pack years: 30.00     Types: Cigarettes     Quit date: 1996     Years since quittin.1     Passive exposure: Past    Smokeless tobacco: Never    Tobacco comments:     Quit at 40 y/o   Vaping Use    Vaping Use: Never used   Substance and Sexual Activity    Alcohol use: Yes     Alcohol/week: 0.0 standard drinks of alcohol     Comment: socially    Drug use: No   Other Topics Concern    Caffeine Concern Yes     Comment: Coffee, 2 cups per week     Exercise No              Review of Systems    Positive for stated complaint: urinary symptom,fever  Other systems are as noted in HPI.  Constitutional and vital signs reviewed.      All other systems reviewed and negative except as noted  above.    Physical Exam     ED Triage Vitals [02/12/24 2018]   /54   Pulse 78   Resp 19   Temp 98.4 °F (36.9 °C)   Temp src Oral   SpO2 92 %   O2 Device None (Room air)       Current:/46   Pulse 59   Temp 98.4 °F (36.9 °C) (Oral)   Resp 18   Ht 156.2 cm (5' 1.5\")   Wt 113.4 kg   SpO2 93%   BMI 46.47 kg/m²         Physical Exam          ED Course     Labs Reviewed   COMP METABOLIC PANEL (14) - Abnormal; Notable for the following components:       Result Value    Glucose 102 (*)     BUN/CREA Ratio 22.7 (*)     ALT 9 (*)     All other components within normal limits   URINALYSIS WITH CULTURE REFLEX - Abnormal; Notable for the following components:    Clarity Urine Turbid (*)     Protein Urine Trace (*)     Leukocyte Esterase Urine 500 (*)     WBC Urine 21-50 (*)     Squamous Epi. Cells Few (*)     Ca Oxalate Crystals Few (*)     All other components within normal limits   SARS-COV-2/FLU A AND B/RSV BY PCR (GENEXPERT) - Abnormal; Notable for the following components:    SARS-CoV-2 (COVID-19) - (GeneXpert) Detected (*)     All other components within normal limits    Narrative:     This test is intended for the qualitative detection and differentiation of SARS-CoV-2, influenza A, influenza B, and respiratory syncytial virus (RSV) viral RNA in nasopharyngeal or nares swabs from individuals suspected of respiratory viral infection consistent with COVID-19 by their healthcare provider. Signs and symptoms of respiratory viral infection due to SARS-CoV-2, influenza, and RSV can be similar.    Test performed using the Xpert Xpress SARS-CoV-2/FLU/RSV (real time RT-PCR)  assay on the GeneXpert instrument, [employer], [place] [address].   This test is being used under the Food and Drug Administration's Emergency Use Authorization.    The authorized Fact Sheet for Healthcare Providers for this assay is available upon request from the laboratory.   CBC W/ DIFFERENTIAL - Abnormal; Notable for the following  components:    HGB 11.7 (*)     All other components within normal limits   CBC WITH DIFFERENTIAL WITH PLATELET    Narrative:     The following orders were created for panel order CBC With Differential With Platelet.  Procedure                               Abnormality         Status                     ---------                               -----------         ------                     CBC W/ DIFFERENTIAL[[id number]]          Abnormal            Final result                 Please view results for these tests on the individual orders.   URINE CULTURE, ROUTINE                      MDM      69-year-old female with history of previous UTI presents today with fever, low back pain, urinary frequency, and dysuria.  She also reports that this afternoon she was experiencing some chills.  Of note, she has been having URI symptoms on and off since December and completed a course of antibiotics 2 weeks ago.    On exam, vitals normal, well-appearing, soft and nontender abdomen, no CVA tenderness, respiratory distress    Differential: UTI, cystitis, bronchitis, viral URI, considered but low likelihood pneumonia    Labs generally reassuring but urinalysis consistent with infection and COVID-19 PCR positive.    Patient likely outside of Paxlovid window.  Will prescribe Macrobid for UTI and give PMD follow-up instructions with return precautions.                               MDM    Disposition and Plan     Clinical Impression:  1. COVID-19    2. Acute cystitis without hematuria         Disposition:  Discharge  2/12/2024  9:43 pm    Follow-up:  Christos Shore MD  [address] [place] [id number]  [phone]    Follow up in 1 week(s)  As needed    We recommend that you schedule follow up care with a primary care provider within the next three months to obtain basic health screening including reassessment of your blood pressure.      Medications Prescribed:  Discharge Medication List as of 2/12/2024  9:48 PM         START taking these medications    Details   nitrofurantoin monohydrate macro 100 MG Oral Cap Take 1 capsule (100 mg total) by mouth 2 (two) times daily for 10 days., Normal, Disp-20 capsule, R-0

## 2024-02-14 NOTE — TELEPHONE ENCOUNTER
Patient has COVID, tested positive 2/12/24    Message below did not specify patients needs. Patient requesting visit after quarantine  1/23/24 or 1/27/24 after 1pm. Please advise if able to add on to either day.

## 2024-02-14 NOTE — TELEPHONE ENCOUNTER
Spoke with patient, (  Name and  verified ) informed of Dr. Shore's instructions below      Future Appointments   Date Time Provider Department Center   2024  2:30 PM Christos Shore MD [facility]

## 2024-02-15 ENCOUNTER — HOSPITAL ENCOUNTER (INPATIENT)
Facility: HOSPITAL | Age: 69
LOS: 3 days | Discharge: HOME HEALTH | End: 2024-02-19
Attending: EMERGENCY MEDICINE | Admitting: STUDENT IN AN ORGANIZED HEALTH CARE EDUCATION/TRAINING PROGRAM
Payer: MEDICARE

## 2024-02-15 ENCOUNTER — APPOINTMENT (OUTPATIENT)
Dept: CT IMAGING | Facility: HOSPITAL | Age: 69
End: 2024-02-15
Attending: EMERGENCY MEDICINE
Payer: MEDICARE

## 2024-02-15 DIAGNOSIS — N30.00 ACUTE CYSTITIS WITHOUT HEMATURIA: Primary | ICD-10-CM

## 2024-02-15 LAB
ANION GAP SERPL CALC-SCNC: 3 MMOL/L (ref 0–18)
BASOPHILS # BLD AUTO: 0.03 X10(3) UL (ref 0–0.2)
BASOPHILS NFR BLD AUTO: 0.3 %
BUN BLD-MCNC: 23 MG/DL (ref 9–23)
BUN/CREAT SERPL: 31.5 (ref 10–20)
CALCIUM BLD-MCNC: 9.1 MG/DL (ref 8.7–10.4)
CHLORIDE SERPL-SCNC: 108 MMOL/L (ref 98–112)
CO2 SERPL-SCNC: 26 MMOL/L (ref 21–32)
CREAT BLD-MCNC: 0.73 MG/DL
DEPRECATED RDW RBC AUTO: 42.6 FL (ref 35.1–46.3)
EGFRCR SERPLBLD CKD-EPI 2021: 90 ML/MIN/1.73M2 (ref 60–?)
EOSINOPHIL # BLD AUTO: 0.33 X10(3) UL (ref 0–0.7)
EOSINOPHIL NFR BLD AUTO: 3.3 %
ERYTHROCYTE [DISTWIDTH] IN BLOOD BY AUTOMATED COUNT: 13.9 % (ref 11–15)
GLUCOSE BLD-MCNC: 103 MG/DL (ref 70–99)
HCT VFR BLD AUTO: 38.8 %
HGB BLD-MCNC: 12.8 G/DL
IMM GRANULOCYTES # BLD AUTO: 0.04 X10(3) UL (ref 0–1)
IMM GRANULOCYTES NFR BLD: 0.4 %
LYMPHOCYTES # BLD AUTO: 2.85 X10(3) UL (ref 1–4)
LYMPHOCYTES NFR BLD AUTO: 28.7 %
MCH RBC QN AUTO: 27.9 PG (ref 26–34)
MCHC RBC AUTO-ENTMCNC: 33 G/DL (ref 31–37)
MCV RBC AUTO: 84.7 FL
MONOCYTES # BLD AUTO: 0.6 X10(3) UL (ref 0.1–1)
MONOCYTES NFR BLD AUTO: 6 %
NEUTROPHILS # BLD AUTO: 6.09 X10 (3) UL (ref 1.5–7.7)
NEUTROPHILS # BLD AUTO: 6.09 X10(3) UL (ref 1.5–7.7)
NEUTROPHILS NFR BLD AUTO: 61.3 %
OSMOLALITY SERPL CALC.SUM OF ELEC: 288 MOSM/KG (ref 275–295)
PLATELET # BLD AUTO: 422 10(3)UL (ref 150–450)
POTASSIUM SERPL-SCNC: 4.1 MMOL/L (ref 3.5–5.1)
RBC # BLD AUTO: 4.58 X10(6)UL
SODIUM SERPL-SCNC: 137 MMOL/L (ref 136–145)
WBC # BLD AUTO: 9.9 X10(3) UL (ref 4–11)

## 2024-02-15 PROCEDURE — 74176 CT ABD & PELVIS W/O CONTRAST: CPT | Performed by: EMERGENCY MEDICINE

## 2024-02-15 PROCEDURE — 99223 1ST HOSP IP/OBS HIGH 75: CPT | Performed by: HOSPITALIST

## 2024-02-15 NOTE — PROGRESS NOTES
ED Culture Callback Results Review    Pharmacist reviewed culture results from ED visit .    Final urine culture positive for pseudomonas aeruginosa that is not susceptible to previously prescribed Nitrofurantoin (Macrobid) therapy.     Discussed with patient if she would like to try cipro (documented allergy to cipro) or come back to the ED to be started on IV antibiotics. She opted to come back to the ED. Charge nurse notified.     Syd Beck PharmD   Emergency Medicine Pharmacist Specialist  02/15/24; 4:35 PM

## 2024-02-16 ENCOUNTER — APPOINTMENT (OUTPATIENT)
Dept: PICC SERVICES | Facility: HOSPITAL | Age: 69
End: 2024-02-16
Attending: INTERNAL MEDICINE
Payer: MEDICARE

## 2024-02-16 LAB
ANION GAP SERPL CALC-SCNC: 7 MMOL/L (ref 0–18)
BASOPHILS # BLD AUTO: 0.02 X10(3) UL (ref 0–0.2)
BASOPHILS NFR BLD AUTO: 0.2 %
BUN BLD-MCNC: 23 MG/DL (ref 9–23)
BUN/CREAT SERPL: 30.7 (ref 10–20)
CALCIUM BLD-MCNC: 8.8 MG/DL (ref 8.7–10.4)
CHLORIDE SERPL-SCNC: 107 MMOL/L (ref 98–112)
CO2 SERPL-SCNC: 26 MMOL/L (ref 21–32)
CREAT BLD-MCNC: 0.75 MG/DL
DEPRECATED RDW RBC AUTO: 43.5 FL (ref 35.1–46.3)
EGFRCR SERPLBLD CKD-EPI 2021: 87 ML/MIN/1.73M2 (ref 60–?)
EOSINOPHIL # BLD AUTO: 0.4 X10(3) UL (ref 0–0.7)
EOSINOPHIL NFR BLD AUTO: 4 %
ERYTHROCYTE [DISTWIDTH] IN BLOOD BY AUTOMATED COUNT: 13.8 % (ref 11–15)
GLUCOSE BLD-MCNC: 111 MG/DL (ref 70–99)
HCT VFR BLD AUTO: 39.2 %
HGB BLD-MCNC: 12.3 G/DL
IMM GRANULOCYTES # BLD AUTO: 0.05 X10(3) UL (ref 0–1)
IMM GRANULOCYTES NFR BLD: 0.5 %
LYMPHOCYTES # BLD AUTO: 2.91 X10(3) UL (ref 1–4)
LYMPHOCYTES NFR BLD AUTO: 29.2 %
MCH RBC QN AUTO: 27 PG (ref 26–34)
MCHC RBC AUTO-ENTMCNC: 31.4 G/DL (ref 31–37)
MCV RBC AUTO: 86 FL
MONOCYTES # BLD AUTO: 0.75 X10(3) UL (ref 0.1–1)
MONOCYTES NFR BLD AUTO: 7.5 %
NEUTROPHILS # BLD AUTO: 5.84 X10 (3) UL (ref 1.5–7.7)
NEUTROPHILS # BLD AUTO: 5.84 X10(3) UL (ref 1.5–7.7)
NEUTROPHILS NFR BLD AUTO: 58.6 %
OSMOLALITY SERPL CALC.SUM OF ELEC: 294 MOSM/KG (ref 275–295)
PLATELET # BLD AUTO: 371 10(3)UL (ref 150–450)
POTASSIUM SERPL-SCNC: 4.3 MMOL/L (ref 3.5–5.1)
RBC # BLD AUTO: 4.56 X10(6)UL
SODIUM SERPL-SCNC: 140 MMOL/L (ref 136–145)
WBC # BLD AUTO: 10 X10(3) UL (ref 4–11)

## 2024-02-16 PROCEDURE — 99233 SBSQ HOSP IP/OBS HIGH 50: CPT | Performed by: HOSPITALIST

## 2024-02-16 RX ORDER — HEPARIN SODIUM 5000 [USP'U]/ML
5000 INJECTION, SOLUTION INTRAVENOUS; SUBCUTANEOUS EVERY 12 HOURS
Status: DISCONTINUED | OUTPATIENT
Start: 2024-02-16 | End: 2024-02-19

## 2024-02-16 RX ORDER — HYDROCHLOROTHIAZIDE 12.5 MG/1
12.5 TABLET ORAL DAILY
Status: DISCONTINUED | OUTPATIENT
Start: 2024-02-16 | End: 2024-02-19

## 2024-02-16 RX ORDER — FLUTICASONE PROPIONATE 50 MCG
1 SPRAY, SUSPENSION (ML) NASAL DAILY
Status: DISCONTINUED | OUTPATIENT
Start: 2024-02-16 | End: 2024-02-19

## 2024-02-16 RX ORDER — IRBESARTAN AND HYDROCHLOROTHIAZIDE 150; 12.5 MG/1; MG/1
1 TABLET, FILM COATED ORAL DAILY
Status: DISCONTINUED | OUTPATIENT
Start: 2024-02-16 | End: 2024-02-16 | Stop reason: RX

## 2024-02-16 RX ORDER — ATORVASTATIN CALCIUM 20 MG/1
20 TABLET, FILM COATED ORAL NIGHTLY
Status: DISCONTINUED | OUTPATIENT
Start: 2024-02-16 | End: 2024-02-19

## 2024-02-16 RX ORDER — MAGNESIUM HYDROXIDE/ALUMINUM HYDROXICE/SIMETHICONE 120; 1200; 1200 MG/30ML; MG/30ML; MG/30ML
30 SUSPENSION ORAL 4 TIMES DAILY PRN
Status: DISCONTINUED | OUTPATIENT
Start: 2024-02-16 | End: 2024-02-19

## 2024-02-16 RX ORDER — HYDRALAZINE HYDROCHLORIDE 20 MG/ML
10 INJECTION INTRAMUSCULAR; INTRAVENOUS EVERY 4 HOURS PRN
Status: DISCONTINUED | OUTPATIENT
Start: 2024-02-16 | End: 2024-02-19

## 2024-02-16 RX ORDER — HYDROCODONE BITARTRATE AND ACETAMINOPHEN 5; 325 MG/1; MG/1
1 TABLET ORAL EVERY 6 HOURS PRN
Status: DISCONTINUED | OUTPATIENT
Start: 2024-02-16 | End: 2024-02-19

## 2024-02-16 RX ORDER — LOSARTAN POTASSIUM 50 MG/1
50 TABLET ORAL DAILY
Status: DISCONTINUED | OUTPATIENT
Start: 2024-02-16 | End: 2024-02-19

## 2024-02-16 RX ORDER — CYANOCOBALAMIN 1000 UG/ML
1000 INJECTION, SOLUTION INTRAMUSCULAR; SUBCUTANEOUS ONCE
Status: COMPLETED | OUTPATIENT
Start: 2024-02-16 | End: 2024-02-16

## 2024-02-16 RX ORDER — ZOLPIDEM TARTRATE 5 MG/1
5 TABLET ORAL NIGHTLY PRN
Status: DISCONTINUED | OUTPATIENT
Start: 2024-02-16 | End: 2024-02-19

## 2024-02-16 RX ORDER — ONDANSETRON 2 MG/ML
4 INJECTION INTRAMUSCULAR; INTRAVENOUS EVERY 6 HOURS PRN
Status: DISCONTINUED | OUTPATIENT
Start: 2024-02-16 | End: 2024-02-19

## 2024-02-16 RX ORDER — ACETAMINOPHEN 325 MG/1
650 TABLET ORAL EVERY 6 HOURS PRN
Status: DISCONTINUED | OUTPATIENT
Start: 2024-02-16 | End: 2024-02-19

## 2024-02-16 NOTE — CONSULTS
[facility] ID CONSULT NOTE    Qing Miranda Patient Status:  Inpatient    1955 MRN [id number]   Location [facility]5W Attending Ry Love MD   Hosp Day # 0 PCP Christos Shore MD       Reason for Consultation:  UTI    ASSESSMENT:    Antibiotics: IV cefepime    # Acute UTI with pyelo with PSAR - macrobid PTA   - CT A/P w/o stones  # COVID-19 diagnosed  with sinusitis sx x3 weeks s/p doxy and Augmentin  # HTN, HLD, morbid obesity      PLAN:    -  IV cefepime  -  f/up on cx  -  place midline  -  plan for IV cefepime x10 days on discharge  -  Follow fever curve, wbc  -  Reviewed labs, micro, imaging reports, available old records  -  d/w patient, RN, Primary    History of Present Illness:  Qing Miranda is a a(n) 69 year old female. Patient is a 69-year-old female with a history of HTN, HLD, morbid obesity with a BMI of 47 who was recently seen in the ED on  with fever, low back pain, urinary frequency w/o f/c, dysuria.  Found to be COVID-19 positive.  UA with pyuria although no bacteria seen urine culture came back as Pseudomonas.  Was discharged on Macrobid.  Blood cultures came back as permitted since has a recorded allergy to Cipro with hives.  Currently asymptomatic.  No fever or leukocytosis.    History:  Past Medical History:   Diagnosis Date    Diverticulosis     Frequent UTI     Hemorrhoids     High cholesterol     HTN (hypertension)     Hyperlipidemia     Hypothyroidism     subclinical hypothyroidism    Macular degeneration     Morbid obesity with BMI of 50.0-59.9, adult (HCC)     Prediabetes     Pregnancy     Per NextGen:  \" 2, Para 1, Miscarriage, 1 .\"    Supraventricular tachycardia     Management:  AV node ablation     Vitamin B12 deficiency      Past Surgical History:   Procedure Laterality Date    ABLATION      AV node ablation          CHOLECYSTECTOMY      COLONOSCOPY N/A 2017    Procedure:  COLONOSCOPY;  Surgeon: Vicente Bryant MD;  Location: [facility] ENDOSCOPY    HEMORRHOIDECTOMY      OTHER SURGICAL HISTORY      laparotomy- adhesiolysis of bowel     Family History   Problem Relation Age of Onset    Diabetes Mother     Heart Disorder Mother         Congestive Heart Failure    Renal Disease Mother     Other (Other) Mother     Melanoma Father     Cancer Father         Basal Cell Cancer    Diabetes Sister     Obesity Sister     Ovarian Cancer Maternal Grandmother     Other (Other) Other         No Family h    Other (Other) Other         No Family h      reports that she quit smoking about 28 years ago. Her smoking use included cigarettes. She has a 30 pack-year smoking history. She has been exposed to tobacco smoke. She has never used smokeless tobacco. She reports current alcohol use. She reports that she does not use drugs.    Allergies:  Allergies   Allergen Reactions    Ciprofloxacin HIVES       Medications:    Current Facility-Administered Medications:     atorvastatin (Lipitor) tab 20 mg, 20 mg, Oral, Nightly    ondansetron (Zofran) 4 MG/2ML injection 4 mg, 4 mg, Intravenous, Q6H PRN    acetaminophen (Tylenol) tab 650 mg, 650 mg, Oral, Q6H PRN    heparin (Porcine) 5000 UNIT/ML injection 5,000 Units, 5,000 Units, Subcutaneous, Q12H    hydrALAzine (Apresoline) 20 mg/mL injection 10 mg, 10 mg, Intravenous, Q4H PRN    HYDROcodone-acetaminophen (Norco) 5-325 MG per tab 1 tablet, 1 tablet, Oral, Q6H PRN    zolpidem (Ambien) tab 5 mg, 5 mg, Oral, Nightly PRN    alum-mag hydroxide-simethicone (Maalox) 200-200-20 MG/5ML oral suspension 30 mL, 30 mL, Oral, QID PRN    ceFEPIme (Maxipime) 1 g in sodium chloride 0.9% 100 mL IVPB-MBP, 1 g, Intravenous, Q8H    losartan (Cozaar) tab 50 mg, 50 mg, Oral, Daily **AND** hydroCHLOROthiazide (HYDRODIURIL) tab 12.5 mg, 12.5 mg, Oral, Daily    influenza vaccine high dose quad (Fluzone QIV HD) 0.7 mL IM injection (ages >/= 65 years) 0.7 mL, 0.7 mL, Intramuscular, Prior to  discharge    fluticasone propionate (Flonase) 50 MCG/ACT nasal suspension 1 spray, 1 spray, Each Nare, Daily    cyanocobalamin (Vitamin B12) 1000 MCG/ML injection 1,000 mcg, 1,000 mcg, Intramuscular, Once    Review of Systems:  CONSTITUTIONAL:  No weight loss, weakness or +fatigue.  HEENT:  Eyes:  No visual loss, blurred vision, double vision or yellow sclerae. Ears, Nose, Throat:  No hearing loss, sneezing, congestion, runny nose or sore throat.  SKIN:  No rash or itching.  CARDIOVASCULAR:  No chest pain, chest pressure or chest discomfort  RESPIRATORY:  No shortness of breath, cough or sputum.  GASTROINTESTINAL:  No anorexia, nausea, vomiting or diarrhea. No abdominal pain or blood.  GENITOURINARY:  No Burning on urination.   NEUROLOGICAL:  No headache, dizziness, syncope, paralysis, ataxia, numbness or tingling in the extremities.  MUSCULOSKELETAL:  No muscle, back pain, joint pain or stiffness.    Physical Exam:  Vital signs: Blood pressure 146/75, pulse 83, temperature 97.7 °F (36.5 °C), temperature source Oral, resp. rate 18, height 154.9 cm (5' 1\"), weight 250 lb (113.4 kg), SpO2 92%, not currently breastfeeding.    General: Alert, oriented, NAD  HEENT: Moist mucous membranes. EOMI  Neck: No lymphadenopathy.  Supple.  Cardiovascular: RRR  Respiratory: Symmetric expansion  Abdomen: Soft, nontender, nondistended.   Musculoskeletal: No edema noted  Integument: No lesions. No erythema.    Laboratory Data: Reviewed in EMR    Microbiology: Reviewed in EMR    Radiology: Reviewed    Thank you for allowing us to participate in the care of this patient. Please do not hesitate to call if you have any questions.     We will continue to follow with you and will make further recommendations based on his progress.    Uday Hutchinson MD   [facility], [place], operated by [facility]  [phone]  2/16/2024

## 2024-02-16 NOTE — PROGRESS NOTES
Therapeutic interchange from Irbesartan-hydroCHLOROthiazide (Avalide) 150-12.5 MG  to losartan (Cozaar) tab 50 mg & hydroCHLOROthiazide (HYDRODIURIL) tab 12.5 mg  per P&T approved protocol.    Connie Garcia, PharmD

## 2024-02-16 NOTE — H&P
[facility]    History & Physical    Qing Miranda Patient Status:  Emergency    1955 MRN [id number]   Location [facility] EMERGENCY DEPARTMENT Attending Aide Martínez MD   Hosp Day # 0 PCP Christos Shore MD     Date:  2/15/2024  Date of Admission:  2/15/2024    Chief Complaint:  Chief Complaint   Patient presents with    UTI       History of Present Illness:  Qing Miranda is a(n) 69 year old female, with past medical history significant for frequent UTIs, renal calculi hypertension and hyperlipidemia was called into the ER after her urine culture returned positive for Pseudomonas sensitive to Cipro however patient is allergic to quinolones.  She had come to the ER earlier with complaint of fever chills and dysuria at that time was started on Macrobid for presumptive UTI, results now indicate Pseudomonas in her urine which will require IV antibiotics considering her Cipro allergy.  Claims her symptoms have somewhat improved since starting the Macrobid however continues to have increased urinary frequency.    History:  Past Medical History:   Diagnosis Date    Diverticulosis     Frequent UTI     Hemorrhoids     High cholesterol     HTN (hypertension)     Hyperlipidemia     Hypothyroidism     subclinical hypothyroidism    Macular degeneration     Morbid obesity with BMI of 50.0-59.9, adult (HCC)     Prediabetes     Pregnancy     Per [employer]:  \" 2, Para 1, Miscarriage, 1 .\"    Supraventricular tachycardia     Management:  AV node ablation     Vitamin B12 deficiency      Past Surgical History:   Procedure Laterality Date    ABLATION      AV node ablation          CHOLECYSTECTOMY      COLONOSCOPY N/A 2017    Procedure: COLONOSCOPY;  Surgeon: Vicente Bryant MD;  Location: [facility] ENDOSCOPY    HEMORRHOIDECTOMY      OTHER SURGICAL HISTORY      laparotomy- adhesiolysis of bowel     Family History   Problem Relation Age of Onset     Diabetes Mother     Heart Disorder Mother         Congestive Heart Failure    Renal Disease Mother     Other (Other) Mother     Melanoma Father     Cancer Father         Basal Cell Cancer    Diabetes Sister     Obesity Sister     Ovarian Cancer Maternal Grandmother     Other (Other) Other         No Family h    Other (Other) Other         No Family h      reports that she quit smoking about 28 years ago. Her smoking use included cigarettes. She has a 30 pack-year smoking history. She has been exposed to tobacco smoke. She has never used smokeless tobacco. She reports current alcohol use. She reports that she does not use drugs.    Allergies:  Allergies   Allergen Reactions    Ciprofloxacin HIVES       Home Medications:  Prior to Admission Medications   Prescriptions Last Dose Informant Patient Reported? Taking?   Doxycycline Monohydrate 100 MG Oral Tab   No No   Sig: Take 100 mg by mouth 2 (two) times daily.   Homeopathic Products (IRRITATED EYE RELIEF OP)   Yes No   Sig: Apply to eye.   Irbesartan-hydroCHLOROthiazide (AVALIDE) 150-12.5 MG Oral Tab   No No   Sig: Take 1 tablet by mouth daily.   Patient not taking: Reported on 4/25/2023   Potassium Chloride ER 10 MEQ Oral Tab CR   No No   Sig: Take 1 tablet (10 mEq total) by mouth daily as needed.   Syringe/Needle, Disp, (BD LUER-MARGARITO SYRINGE) 25G X 1\" 3 ML Does not apply Misc   No No   Sig: USE TWICE A MONTH AS DIRECTED   amoxicillin clavulanate 875-125 MG Oral Tab   No No   Sig: Take 1 tablet by mouth 2 (two) times daily for 7 days.   atorvastatin 20 MG Oral Tab   No No   Sig: Take 1 tablet (20 mg total) by mouth nightly.   benzonatate 100 MG Oral Cap   No No   Sig: Take 1 capsule (100 mg total) by mouth 3 (three) times daily as needed.   cloNIDine 0.1 MG Oral Tab   No No   Sig: Take one tab po daily   Patient not taking: Reported on 1/26/2024   cyanocobalamin 1000 MCG/ML Injection Solution   No No   Sig: INJECT 1 ML INTO THE MUSCLE EVERY 15 DAYS   fluticasone  propionate 50 MCG/ACT Nasal Suspension   No No   Sig: SHAKE LIQUID AND USE 2 SPRAYS IN EACH NOSTRIL DAILY   furosemide 20 MG Oral Tab   No No   Sig: Take 1 tablet (20 mg total) by mouth daily as needed.   nitrofurantoin monohydrate macro 100 MG Oral Cap   No No   Sig: Take 1 capsule (100 mg total) by mouth 2 (two) times daily for 10 days.   triamcinolone 0.1 % External Cream   No No   Sig: Apply rash on elbow bid   Patient not taking: Reported on 1/26/2024      Facility-Administered Medications: None       Review of Systems:  Constitutional:  Weakness, Fatigue.  Eye:  Negative.  Ear/Nose/Mouth/Throat:  Negative.  Respiratory:  Negative  Cardiovascular: Negative  Gastrointestinal:  Negative.  Genitourinary:  Negative  Endocrine:  Negative.  Immunologic:  Negative.  Musculoskeletal:  Negative.  Integumentary:  Negative.  Neurologic:  Negative.  Psychiatric:  Negative.  ROS reviewed as documented in chart    Physical Exam:  Temp:  [97.9 °F (36.6 °C)] 97.9 °F (36.6 °C)  Pulse:  [74] 74  Resp:  [20] 20  BP: (154)/(69) 154/69  SpO2:  [94 %] 94 %    General:  Alert and oriented.  Diffuse skin problem:  None.  Eye:  Pupils are equal, round and reactive to light, extraocular movements are intact, Normal conjunctiva.  HENT:  Normocephalic, oral mucosa is moist.  Head:  Normocephalic, atraumatic.  Neck:  Supple, non-tender, no carotid bruit, no jugular venous distention, no lymphadenopathy, no thyromegaly.  Respiratory:  Lungs are clear to auscultation, respirations are non-labored, breath sounds are equal, symmetrical chest wall expansion.  Cardiovascular:  Normal rate, regular rhythm, no murmur, no edema.  Gastrointestinal:  Soft, non-tender, non-distended, normal bowel sounds, no organomegaly.  Lymphatics:  No lymphadenopathy neck, axilla, groin.  Musculoskeletal: Normal range of motion.  normal strength.  Feet:  Normal pulses.  Neurologic:  Alert, oriented, no focal deficits, cranial nerves II-XII are grossly  intact.  Cognition and Speech:  Oriented, speech clear and coherent.  Psychiatric:  Cooperative, appropriate mood & affect.      Laboratory Data:   Lab Results   Component Value Date    WBC 9.9 02/15/2024    HGB 12.8 02/15/2024    HCT 38.8 02/15/2024    .0 02/15/2024    CREATSERUM 0.73 02/15/2024    BUN 23 02/15/2024     02/15/2024    K 4.1 02/15/2024     02/15/2024    CO2 26.0 02/15/2024     02/15/2024    CA 9.1 02/15/2024       Imaging:  No results found.     Assessment and Plan:    Complicated UTI  Pseudomonas on cultures, patient started on cefepime 1 g IVPB every 8 hours, will continue same.    Essential hypertension  Blood pressure well-controlled resume home medications.    COVID-19  No respiratory symptoms at this time, patient out of window for Paxlovid as well.  Would like to be tested prior to discharge so that she can live again with her  who has severe COPD.    Hyperlipidemia  Continue statin.    Morbid obesity with likely obstructive sleep apnea  BMI 47.2.  Patient counseled regarding diet and excise, consider CPAP at night.    Prophylaxis  Subcutaneous heparin    CODE STATUS  Full    Primary care physician  Christos Shore MD    MDM: High, severe exacerbation of chronic illness posing threat to life.  IV medications requiring close inpatient monitoring  75 minutes spent on this admission - examining patient, obtaining history, reviewing previous medical records, going over test results/imaging and discussing plan of care. All questions answered.     Disposition  Clinical course will dictate outcome      JEAN TALLEY MD  2/15/2024  10:02 PM

## 2024-02-16 NOTE — ED INITIAL ASSESSMENT (HPI)
Covid + pt called to facility after positive urine culture for pseudomonas aeruginosa resulted. Pt started on Macrobid x2 days ago. Pt denies current fever, dysuria, or n/v.  NAD.

## 2024-02-16 NOTE — CM/SW NOTE
Pt will need IV Cefepime at discharge q8.    SW sent infusion referrals- SW asked for nursing help as well as pt does not need HHC services otherwise.    IV abx script attached to referral.    Pt has Medicare- SW to confirm with pt benefits/co-pay.    Pt will need PICC line placed prior to DC.    SW entered flush orders for co-sign.    Update 3:30 PM    [facility] are able to bill pt after antibiotic therapy in case it needs to be shortened or extended.  Other infusion pharmacies asking for payment up front (pt has not met deductible Medicare part D).     [facility] can provide RN for lab draws/PICC line care.  SW reserved [facility] in [place].  SW entered face to face for skilled nursing and entered skilled nursing HHC orders as requested by liamert Peterson.    Update 4:06    SW met with pt at bedside.  Pt very upset SW did not talk to her before sending referrals.  SW stated prices for each pharmacy can vary- SW did not want pt to have to pay up front.    Pt has [employer] RX plan- SW scanned card and uploaded to referral. SW explained in order to expedite process referrals were started as it is Friday afternoon and plan is DC home early tomorrow.    Per Kristen, prescription plans rarely cover home infusion.  Pt's BCBS plan does not cover any of the co-pay.    APN notified as well as ID.  Liamert Peterson told to hold off on delivery for now.    PLAN: DC home w/IV abx by [facility].  [facility] to provide RN    / to remain available for support and/or discharge planning.     Heather Lerner MSW, LSW [phone]

## 2024-02-16 NOTE — PLAN OF CARE
Midline was just placed x pt to go home tomorrow with 10 day course of IV ABT.  No concerns or complaints at this time per pt.  Vitals stable, afebrile. Ambulatory, self in the pt's room.  Continue IV ABT.     Problem: Patient Centered Care  Goal: Patient preferences are identified and integrated in the patient's plan of care  Description: Interventions:  - What would you like us to know as we care for you? I live at home w/ my .  - Provide timely, complete, and accurate information to patient/family  - Incorporate patient and family knowledge, values, beliefs, and cultural backgrounds into the planning and delivery of care  - Encourage patient/family to participate in care and decision-making at the level they choose  - Honor patient and family perspectives and choices  Outcome: Progressing     Problem: Patient/Family Goals  Goal: Patient/Family Long Term Goal  Description: Patient's Long Term Goal: To feel better and return back home w/ my .    Interventions:  -Assess vital signs routinely  -Administer IV Antibiotics as scheduled  -Administer scheduled & PRN medications  -Diagnostic exams  -Routine blood work, report abnormal values to MD(s)  -Update pt on plan of care & give clinical updates  -Assist in discharge planning  - See additional Care Plan goals for specific interventions  Outcome: Progressing  Goal: Patient/Family Short Term Goal  Description: Patient's Short Term Goal: To treat my UTI w/ IV Antibiotics     Interventions:   -Assess vital signs routinely  -Administer IV Antibiotics as scheduled  -Administer scheduled & PRN medications  -Diagnostic exams  -Routine blood work, report abnormal values to MD(s)  -Update pt on plan of care & give clinical updates  -Assist in discharge planning    - See additional Care Plan goals for specific interventions  Outcome: Progressing     Problem: RESPIRATORY - ADULT  Goal: Achieves optimal ventilation and oxygenation  Description: INTERVENTIONS:  -  Assess for changes in respiratory status  - Assess for changes in mentation and behavior  - Position to facilitate oxygenation and minimize respiratory effort  - Oxygen supplementation based on oxygen saturation or ABGs  - Provide Smoking Cessation handout, if applicable  - Encourage broncho-pulmonary hygiene including cough, deep breathe, Incentive Spirometry  - Assess the need for suctioning and perform as needed  - Assess and instruct to report SOB or any respiratory difficulty  - Respiratory Therapy support as indicated  - Manage/alleviate anxiety  - Monitor for signs/symptoms of CO2 retention  Outcome: Progressing     Problem: CARDIOVASCULAR - ADULT  Goal: Maintains optimal cardiac output and hemodynamic stability  Description: INTERVENTIONS:  - Monitor vital signs, rhythm, and trends  - Monitor for bleeding, hypotension and signs of decreased cardiac output  - Evaluate effectiveness of vasoactive medications to optimize hemodynamic stability  - Monitor arterial and/or venous puncture sites for bleeding and/or hematoma  - Assess quality of pulses, skin color and temperature  - Assess for signs of decreased coronary artery perfusion - ex. Angina  - Evaluate fluid balance, assess for edema, trend weights  Outcome: Progressing  Goal: Absence of cardiac arrhythmias or at baseline  Description: INTERVENTIONS:  - Continuous cardiac monitoring, monitor vital signs, obtain 12 lead EKG if indicated  - Evaluate effectiveness of antiarrhythmic and heart rate control medications as ordered  - Initiate emergency measures for life threatening arrhythmias  - Monitor electrolytes and administer replacement therapy as ordered  Outcome: Progressing     Problem: PAIN - ADULT  Goal: Verbalizes/displays adequate comfort level or patient's stated pain goal  Description: INTERVENTIONS:  - Encourage pt to monitor pain and request assistance  - Assess pain using appropriate pain scale  - Administer analgesics based on type and severity  of pain and evaluate response  - Implement non-pharmacological measures as appropriate and evaluate response  - Consider cultural and social influences on pain and pain management  - Manage/alleviate anxiety  - Utilize distraction and/or relaxation techniques  - Monitor for opioid side effects  - Notify MD/LIP if interventions unsuccessful or patient reports new pain  - Anticipate increased pain with activity and pre-medicate as appropriate  Outcome: Progressing     Problem: GENITOURINARY - ADULT  Goal: Absence of urinary retention  Description: INTERVENTIONS:  - Assess patient’s ability to void and empty bladder  - Monitor intake/output and perform bladder scan as needed  - Follow urinary retention protocol/standard of care  - Consider collaborating with pharmacy to review patient's medication profile  - Implement strategies to promote bladder emptying  Outcome: Progressing     Problem: METABOLIC/FLUID AND ELECTROLYTES - ADULT  Goal: Electrolytes maintained within normal limits  Description: INTERVENTIONS:  - Monitor labs and rhythm and assess patient for signs and symptoms of electrolyte imbalances  - Administer electrolyte replacement as ordered  - Monitor response to electrolyte replacements, including rhythm and repeat lab results as appropriate  - Fluid restriction as ordered  - Instruct patient on fluid and nutrition restrictions as appropriate  Outcome: Progressing  Goal: Hemodynamic stability and optimal renal function maintained  Description: INTERVENTIONS:  - Monitor labs and assess for signs and symptoms of volume excess or deficit  - Monitor intake, output and patient weight  - Monitor urine specific gravity, serum osmolarity and serum sodium as indicated or ordered  - Monitor response to interventions for patient's volume status, including labs, urine output, blood pressure (other measures as available)  - Encourage oral intake as appropriate  - Instruct patient on fluid and nutrition restrictions as  appropriate  Outcome: Progressing     Problem: SKIN/TISSUE INTEGRITY - ADULT  Goal: Skin integrity remains intact  Description: INTERVENTIONS  - Assess and document risk factors for pressure ulcer development  - Assess and document skin integrity  - Monitor for areas of redness and/or skin breakdown  - Initiate interventions, skin care algorithm/standards of care as needed  Outcome: Progressing  Goal: Oral mucous membranes remain intact  Description: INTERVENTIONS  - Assess oral mucosa and hygiene practices  - Implement preventative oral hygiene regimen  - Implement oral medicated treatments as ordered  Outcome: Progressing

## 2024-02-16 NOTE — PROGRESS NOTES
[facility]    Progress Note    Qing Miranda Patient Status:  Inpatient    1955 MRN [id number]   Location [facility]5W Attending Ry Love MD   Hosp Day # 0 PCP Christos Shore MD       Subjective:   Qing Miranda rests in bed comfortably, stating she drinks a lot water and urinates frequently, but no pain or itching during urination.    Review of Systems:   10 point ROS completed and was negative, except for pertinent positive and negatives stated in subjective.    Objective:     Vitals:    24 0048 24 0056 24 0613 24 0844   BP: (!) 161/68  118/62 146/75   BP Location: Right arm  Right arm Right arm   Pulse: 69  66 83   Resp:  18   Temp: 97.6 °F (36.4 °C)  98.2 °F (36.8 °C) 97.7 °F (36.5 °C)   TempSrc: Oral  Oral Oral   SpO2: 96%  96% 92%   Weight:  250 lb (113.4 kg)     Height:         Patient Weight for the past 72 hrs:   Weight   02/15/24 2035 250 lb (113.4 kg)   24 0056 250 lb (113.4 kg)       Intake/Output Summary (Last 24 hours) at 2024 0950  Last data filed at 2024 0835  Gross per 24 hour   Intake 780 ml   Output --   Net 780 ml       GENERAL:  The patient appeared to be in no distress   SKIN:  Warm and hydrated  HEENT:  Head was atraumatic and normocephalic.    CHEST:  Symmetrical movement on inspiration  LUNGS:  No audible wheezing  ABDOMEN: Non-distended  MUSCULOSKELETAL:  There was no deformity.    EXTREMITIES: There was no edema  NEUROLOGICAL:  There was no focal deficit.    PSYCHIATRIC: Calm and cooperative     Current Scheduled Inpatient Meds:      atorvastatin  20 mg Oral Nightly    heparin  5,000 Units Subcutaneous Q12H    cefepime  1 g Intravenous Q8H    losartan  50 mg Oral Daily    And    hydrochlorothiazide  12.5 mg Oral Daily    fluticasone propionate  1 spray Each Nare Daily       Current PRN Inpatient Meds:      ondansetron, acetaminophen, hydrALAzine, HYDROcodone-acetaminophen, zolpidem, alum-mag  hydroxide-simethicone, influenza virus vaccine PF    Results:     Lab Results   Component Value Date    WBC 10.0 02/16/2024    HGB 12.3 02/16/2024    HCT 39.2 02/16/2024    .0 02/16/2024    CREATSERUM 0.75 02/16/2024    BUN 23 02/16/2024     02/16/2024    K 4.3 02/16/2024     02/16/2024    CO2 26.0 02/16/2024     (H) 02/16/2024    CA 8.8 02/16/2024    ALB 4.2 02/12/2024    ALKPHO 99 02/12/2024    BILT 0.3 02/12/2024    TP 7.0 02/12/2024    AST 13 02/12/2024    ALT 9 (L) 02/12/2024    T4F 1.0 03/10/2023    TSH 5.080 (H) 03/10/2023    LIP 65 (L) 10/07/2021    MG 1.9 10/10/2021    PHOS 3.7 11/30/2021    B12 489 04/29/2021       Recent Labs   Lab 02/12/24  2029 02/15/24  2109 02/16/24  0505   RBC 4.37 4.58 4.56   HGB 11.7* 12.8 12.3   HCT 36.9 38.8 39.2   MCV 84.4 84.7 86.0   MCH 26.8 27.9 27.0   MCHC 31.7 33.0 31.4   RDW 13.7 13.9 13.8   NEPRELIM 4.98 6.09 5.84   WBC 7.4 9.9 10.0   .0 422.0 371.0     Recent Labs   Lab 02/12/24  2028 02/15/24  2110 02/16/24  0505   * 103* 111*   BUN 17 23 23   CREATSERUM 0.75 0.73 0.75   CA 9.1 9.1 8.8   ALB 4.2  --   --     137 140   K 4.0 4.1 4.3    108 107   CO2 25.0 26.0 26.0   ALKPHO 99  --   --    AST 13  --   --    ALT 9*  --   --    BILT 0.3  --   --    TP 7.0  --   --      No results found for: \"PT\", \"INR\"    Culture:  No results found for this visit on 02/15/24.    Imaging/EKG:   No results found.      Assessment and Plan:   Qing Miranda is a 69-year-old female with hx of frequent UTIs, renal calculi, hypertension and hyperlipidemia, who was called back to hospital for positive urine culture for Pseudomonas.    # Complicated UTI, Pseudomonas   # Recurrent UTI  - patient presented to ED 2/12/24 for urinary frequency, UCx obtained, sent home with nitrofurantoin  - UA 2/12/24 has no bacteria or nitrite, but some WBC  - UCx 2/12/4 grew 10,000 - 50,000 CFU/ML Pseudomonas aeruginosa, sensitive to cefepime and fluoroquinolone;  patient developed hives with ciprofloxacin.  - patient has no fever, leukocytosis, or bladder wall thickening on CT; she drinks a lot water and urinates frequently  - cefepime (2/16- )   - ID consulted    # Essential hypertension  - Blood pressure well-controlled resume home medications.     # Hyperlipidemia  - Continue statin    # COVID-19 test positive  - No respiratory symptoms at this time, patient out of window for Paxlovid as well.    - Patient would like to be tested prior to discharge so that she can live again with her  who has severe COPD.     # Morbid obesity  - BMI 47     Diet: regular  PT/OT: deferred  DVT ppx: heparin  Code status: Full  Dispo: per ID    MDM: high Ry Love MD, PhD  Message via Secure Chat  [facility] Hospitalist

## 2024-02-16 NOTE — ED QUICK NOTES
Orders for admission, patient is aware of plan and ready to go upstairs. Any questions, please call ED RN Debi at extension [phone].     Patient Covid vaccination status: Fully vaccinated     COVID Test Ordered in ED: None    COVID Suspicion at Admission: N/A    Running Infusions:  None    Mental Status/LOC at time of transport: A&Ox4    Other pertinent information:   CIWA score: N/A   NIH score:  N/A        
Yes
SHORTNESS OF BREATH/COUGH/EXERTIONAL DYSPNEA

## 2024-02-16 NOTE — PLAN OF CARE
Problem: Patient Centered Care  Goal: Patient preferences are identified and integrated in the patient's plan of care  Description: Interventions:  - What would you like us to know as we care for you? I live at home w/ my .  - Provide timely, complete, and accurate information to patient/family  - Incorporate patient and family knowledge, values, beliefs, and cultural backgrounds into the planning and delivery of care  - Encourage patient/family to participate in care and decision-making at the level they choose  - Honor patient and family perspectives and choices  Outcome: Progressing     Problem: Patient/Family Goals  Goal: Patient/Family Long Term Goal  Description: Patient's Long Term Goal: To feel better and return back home w/ my .    Interventions:  -Assess vital signs routinely  -Administer IV Antibiotics as scheduled  -Administer scheduled & PRN medications  -Diagnostic exams  -Routine blood work, report abnormal values to MD(s)  -Update pt on plan of care & give clinical updates  -Assist in discharge planning  - See additional Care Plan goals for specific interventions  Outcome: Progressing  Goal: Patient/Family Short Term Goal  Description: Patient's Short Term Goal: To treat my UTI w/ IV Antibiotics     Interventions:   -Assess vital signs routinely  -Administer IV Antibiotics as scheduled  -Administer scheduled & PRN medications  -Diagnostic exams  -Routine blood work, report abnormal values to MD(s)  -Update pt on plan of care & give clinical updates  -Assist in discharge planning    - See additional Care Plan goals for specific interventions  Outcome: Progressing     Problem: RESPIRATORY - ADULT  Goal: Achieves optimal ventilation and oxygenation  Description: INTERVENTIONS:  - Assess for changes in respiratory status  - Assess for changes in mentation and behavior  - Position to facilitate oxygenation and minimize respiratory effort  - Oxygen supplementation based on oxygen saturation  or ABGs  - Provide Smoking Cessation handout, if applicable  - Encourage broncho-pulmonary hygiene including cough, deep breathe, Incentive Spirometry  - Assess the need for suctioning and perform as needed  - Assess and instruct to report SOB or any respiratory difficulty  - Respiratory Therapy support as indicated  - Manage/alleviate anxiety  - Monitor for signs/symptoms of CO2 retention  Outcome: Progressing     Problem: CARDIOVASCULAR - ADULT  Goal: Maintains optimal cardiac output and hemodynamic stability  Description: INTERVENTIONS:  - Monitor vital signs, rhythm, and trends  - Monitor for bleeding, hypotension and signs of decreased cardiac output  - Evaluate effectiveness of vasoactive medications to optimize hemodynamic stability  - Monitor arterial and/or venous puncture sites for bleeding and/or hematoma  - Assess quality of pulses, skin color and temperature  - Assess for signs of decreased coronary artery perfusion - ex. Angina  - Evaluate fluid balance, assess for edema, trend weights  Outcome: Progressing  Goal: Absence of cardiac arrhythmias or at baseline  Description: INTERVENTIONS:  - Continuous cardiac monitoring, monitor vital signs, obtain 12 lead EKG if indicated  - Evaluate effectiveness of antiarrhythmic and heart rate control medications as ordered  - Initiate emergency measures for life threatening arrhythmias  - Monitor electrolytes and administer replacement therapy as ordered  Outcome: Progressing     Problem: PAIN - ADULT  Goal: Verbalizes/displays adequate comfort level or patient's stated pain goal  Description: INTERVENTIONS:  - Encourage pt to monitor pain and request assistance  - Assess pain using appropriate pain scale  - Administer analgesics based on type and severity of pain and evaluate response  - Implement non-pharmacological measures as appropriate and evaluate response  - Consider cultural and social influences on pain and pain management  - Manage/alleviate anxiety  -  Utilize distraction and/or relaxation techniques  - Monitor for opioid side effects  - Notify MD/LIP if interventions unsuccessful or patient reports new pain  - Anticipate increased pain with activity and pre-medicate as appropriate  Outcome: Progressing     Problem: GENITOURINARY - ADULT  Goal: Absence of urinary retention  Description: INTERVENTIONS:  - Assess patient’s ability to void and empty bladder  - Monitor intake/output and perform bladder scan as needed  - Follow urinary retention protocol/standard of care  - Consider collaborating with pharmacy to review patient's medication profile  - Implement strategies to promote bladder emptying  Outcome: Progressing     Problem: METABOLIC/FLUID AND ELECTROLYTES - ADULT  Goal: Electrolytes maintained within normal limits  Description: INTERVENTIONS:  - Monitor labs and rhythm and assess patient for signs and symptoms of electrolyte imbalances  - Administer electrolyte replacement as ordered  - Monitor response to electrolyte replacements, including rhythm and repeat lab results as appropriate  - Fluid restriction as ordered  - Instruct patient on fluid and nutrition restrictions as appropriate  Outcome: Progressing  Goal: Hemodynamic stability and optimal renal function maintained  Description: INTERVENTIONS:  - Monitor labs and assess for signs and symptoms of volume excess or deficit  - Monitor intake, output and patient weight  - Monitor urine specific gravity, serum osmolarity and serum sodium as indicated or ordered  - Monitor response to interventions for patient's volume status, including labs, urine output, blood pressure (other measures as available)  - Encourage oral intake as appropriate  - Instruct patient on fluid and nutrition restrictions as appropriate  Outcome: Progressing     Problem: SKIN/TISSUE INTEGRITY - ADULT  Goal: Skin integrity remains intact  Description: INTERVENTIONS  - Assess and document risk factors for pressure ulcer development  -  Assess and document skin integrity  - Monitor for areas of redness and/or skin breakdown  - Initiate interventions, skin care algorithm/standards of care as needed  Outcome: Progressing  Goal: Oral mucous membranes remain intact  Description: INTERVENTIONS  - Assess oral mucosa and hygiene practices  - Implement preventative oral hygiene regimen  - Implement oral medicated treatments as ordered  Outcome: Progressing

## 2024-02-16 NOTE — ED PROVIDER NOTES
Patient Seen in: [facility] Emergency Department      History     Chief Complaint   Patient presents with    UTI     Stated Complaint: + urine pseudomonas, admit for IV abx.    Subjective:   HPI    69-year-old female presents for additional IV IV antibiotics for Pseudomonas urinary obstruction.  Patient seen in the emergency department on 02/12/2022 for fever fever, chills with urinary frequency.  UA was positive, also noted to test positive for COVID, discharged on Macrobid.  Her urine culture showed Pseudomonas, close patient is allergic to ciprofloxacin, currently unable to take oral ciprofloxacin.  She denies nausea nausea or vomiting, no pain.  She reports a history of kidney stones, states that She has had ureteral stones with minimal symptoms    Objective:   No pertinent past medical history.            No pertinent past surgical history.              No pertinent social history.            Review of Systems    Positive for stated complaint: + urine pseudomonas, admit for IV abx.  Other systems are as noted in HPI.  Constitutional and vital signs reviewed.      All other systems reviewed and negative except as noted above.    Physical Exam     ED Triage Vitals [02/15/24 2035]   /69   Pulse 74   Resp 20   Temp 97.9 °F (36.6 °C)   Temp src Temporal   SpO2 94 %   O2 Device None (Room air)       Current:/69   Pulse 74   Temp 97.9 °F (36.6 °C) (Temporal)   Resp 20   Ht 154.9 cm (5' 1\")   Wt 113.4 kg   SpO2 94%   BMI 47.24 kg/m²         Physical Exam  Vitals and nursing note reviewed.   Constitutional:       General: She is not in acute distress.     Appearance: She is well-developed.   HENT:      Head: Normocephalic and atraumatic.   Eyes:      Conjunctiva/sclera: Conjunctivae normal.   Cardiovascular:      Rate and Rhythm: Normal rate and regular rhythm.      Heart sounds: Normal heart sounds.   Pulmonary:      Effort: Pulmonary effort is normal. No respiratory distress.      Breath  sounds: Normal breath sounds.   Abdominal:      General: Bowel sounds are normal. There is no distension.      Palpations: Abdomen is soft.      Tenderness: There is no abdominal tenderness. There is no guarding or rebound.   Musculoskeletal:         General: Normal range of motion.      Cervical back: Normal range of motion and neck supple.   Skin:     General: Skin is warm and dry.      Findings: No rash.   Neurological:      General: No focal deficit present.      Mental Status: She is alert and oriented to person, place, and time.               ED Course     Labs Reviewed   BASIC METABOLIC PANEL (8) - Abnormal; Notable for the following components:       Result Value    Glucose 103 (*)     BUN/CREA Ratio 31.5 (*)     All other components within normal limits   CBC WITH DIFFERENTIAL WITH PLATELET    Narrative:     The following orders were created for panel order CBC With Differential With Platelet.  Procedure                               Abnormality         Status                     ---------                               -----------         ------                     CBC W/ DIFFERENTIAL[[id number]]                              Final result                 Please view results for these tests on the individual orders.   BLOOD CULTURE   BLOOD CULTURE   CBC W/ DIFFERENTIAL             Noncontrast CT abdomen and pelvis  Comparison made with prior CT A/P from 10/7/2021    IMPRESSION:  1.  No renal or ureteral stone, no hydronephrosis or hydroureter.  Urinary bladder is partially distended appearing normal.  2.  5 mm nodules in the right lower lobe x 2 appear unchanged from prior CT, status post cholecystectomy, uncomplicated moderate fat-containing umbilical hernia, colonic diverticulosis without diverticulitis, normal appendix,         MDM        Admission disposition: 2/15/2024 10:39 PM           Medical Decision Making  Afebrile patient with normal WBC, urine culture reviewed, noted to be positive for  Pseudomonas, discussed with pharmacy today started empirically on cefepime..  He seemed to have without evidence of ureteral stone.  Discussed with and admitted to Dr. Alexis.    Problems Addressed:  Acute cystitis without hematuria: acute illness or injury    Amount and/or Complexity of Data Reviewed  External Data Reviewed: labs.     Details: CV CBC and BMP stable complete labs from 2/12/2024  Labs: ordered.  Radiology: ordered.  Discussion of management or test interpretation with external provider(s): Discussed with hospitalist and radiology        Disposition and Plan     Clinical Impression:  1. Acute cystitis without hematuria         Disposition:  Admit  2/15/2024 10:39 pm    Follow-up:  No follow-up provider specified.  We recommend that you schedule follow up care with a primary care provider within the next three months to obtain basic health screening including reassessment of your blood pressure.      Medications Prescribed:  Current Discharge Medication List                            Hospital Problems       Present on Admission  Date Reviewed: 2/5/2024            ICD-10-CM Noted POA    * (Principal) Acute cystitis without hematuria N30.00 2/15/2024 Unknown

## 2024-02-17 PROCEDURE — 99233 SBSQ HOSP IP/OBS HIGH 50: CPT | Performed by: HOSPITALIST

## 2024-02-17 NOTE — PROGRESS NOTES
[facility]    Progress Note    Qing Miranda Patient Status:  Inpatient    1955 MRN [id number]   Location [facility] Attending Ry Love MD   Hosp Day # 1 PCP Christos Shore MD       Subjective:   Qing Miranda rests in bed comfortably, no new complaints, await ID    Review of Systems:   10 point ROS completed and was negative, except for pertinent positive and negatives stated in subjective.    Objective:     Vitals:    24 0844 24 1414 24 2100 24 0400   BP: 146/75 142/57 143/65 116/64   BP Location: Right arm Right arm Right arm Right arm   Pulse: 83 73 70 80   Resp: 18  18 16   Temp: 97.7 °F (36.5 °C) 98.2 °F (36.8 °C) 98.5 °F (36.9 °C) 98.3 °F (36.8 °C)   TempSrc: Oral Oral Oral Oral   SpO2: 92% 93% 95% 93%   Weight:       Height:         Patient Weight for the past 72 hrs:   Weight   02/15/24 2035 250 lb (113.4 kg)   24 0056 250 lb (113.4 kg)       Intake/Output Summary (Last 24 hours) at 2024 0751  Last data filed at 2024 2115  Gross per 24 hour   Intake 1035 ml   Output --   Net 1035 ml       GENERAL:  The patient appeared to be in no distress   SKIN:  Warm and hydrated  HEENT:  Head was atraumatic and normocephalic.    CHEST:  Symmetrical movement on inspiration  LUNGS:  No audible wheezing  ABDOMEN: Non-distended  MUSCULOSKELETAL:  There was no deformity.    EXTREMITIES: There was no edema  NEUROLOGICAL:  There was no focal deficit.    PSYCHIATRIC: Calm and cooperative     Current Scheduled Inpatient Meds:      atorvastatin  20 mg Oral Nightly    heparin  5,000 Units Subcutaneous Q12H    cefepime  1 g Intravenous Q8H    losartan  50 mg Oral Daily    And    hydrochlorothiazide  12.5 mg Oral Daily    fluticasone propionate  1 spray Each Nare Daily       Current PRN Inpatient Meds:      ondansetron, acetaminophen, hydrALAzine, HYDROcodone-acetaminophen, zolpidem, alum-mag hydroxide-simethicone, influenza virus vaccine  PF    Results:     Lab Results   Component Value Date    WBC 10.0 02/16/2024    HGB 12.3 02/16/2024    HCT 39.2 02/16/2024    .0 02/16/2024    CREATSERUM 0.75 02/16/2024    BUN 23 02/16/2024     02/16/2024    K 4.3 02/16/2024     02/16/2024    CO2 26.0 02/16/2024     (H) 02/16/2024    CA 8.8 02/16/2024    ALB 4.2 02/12/2024    ALKPHO 99 02/12/2024    BILT 0.3 02/12/2024    TP 7.0 02/12/2024    AST 13 02/12/2024    ALT 9 (L) 02/12/2024    T4F 1.0 03/10/2023    TSH 5.080 (H) 03/10/2023    LIP 65 (L) 10/07/2021    MG 1.9 10/10/2021    PHOS 3.7 11/30/2021    B12 489 04/29/2021       Recent Labs   Lab 02/12/24  2029 02/15/24  2109 02/16/24  0505   RBC 4.37 4.58 4.56   HGB 11.7* 12.8 12.3   HCT 36.9 38.8 39.2   MCV 84.4 84.7 86.0   MCH 26.8 27.9 27.0   MCHC 31.7 33.0 31.4   RDW 13.7 13.9 13.8   NEPRELIM 4.98 6.09 5.84   WBC 7.4 9.9 10.0   .0 422.0 371.0     Recent Labs   Lab 02/12/24  2028 02/15/24  2110 02/16/24  0505   * 103* 111*   BUN 17 23 23   CREATSERUM 0.75 0.73 0.75   CA 9.1 9.1 8.8   ALB 4.2  --   --     137 140   K 4.0 4.1 4.3    108 107   CO2 25.0 26.0 26.0   ALKPHO 99  --   --    AST 13  --   --    ALT 9*  --   --    BILT 0.3  --   --    TP 7.0  --   --      No results found for: \"PT\", \"INR\"    Culture:  Hospital Encounter on 02/15/24   1. Blood Culture     Status: None (Preliminary result)    Collection Time: 02/15/24  9:14 PM    Specimen: Blood,peripheral   Result Value Ref Range    Blood Culture Result No Growth 1 Day N/A       Imaging/EKG:   CT ABDOMEN+PELVIS KIDNEYSTONE 2D RNDR(NO IV,NO ORAL)(CPT=74176)    Result Date: 2/16/2024  CONCLUSION:  1. No evidence of nephroureterolithiasis or obstructive uropathy.  2. No acute intra-abdominal process is identified by noncontrast CT technique. The etiology of the patient's symptoms is unclear from this study.  3. Pancolonic diverticulosis without CT evidence acute complication.  4. Hepatomegaly with suspected  underlying hepatic steatosis.   5. Status post cholecystectomy without significant hepatobiliary dilatation.  6. Stable right lower lobe pulmonary nodule.  7. Lesser incidental findings as above.    A preliminary report was issued by the [facility] teleradiology service. There are no major discrepancies.   Dictated by (CST): Lloyd Ford MD on 2/16/2024 at 9:45 AM     Finalized by (CST): Lloyd Ford MD on 2/16/2024 at 9:52 AM             Assessment and Plan:   Qign Miranda is a 69-year-old female with hx of frequent UTIs, renal calculi, hypertension and hyperlipidemia, who was called back to hospital for positive urine culture for Pseudomonas.    # Complicated UTI, Pseudomonas   # Recurrent UTI  - patient presented to ED 2/12/24 for urinary frequency, UCx obtained, sent home with nitrofurantoin  - UA 2/12/24 has no bacteria or nitrite, but some WBC  - UCx 2/12/4 grew 10,000 - 50,000 CFU/ML Pseudomonas aeruginosa, sensitive to cefepime and fluoroquinolone; patient developed hives with ciprofloxacin.  - patient has no fever, leukocytosis, or bladder wall thickening on CT; she drinks a lot water and urinates frequently.  - urine culture and blood culture 2/15 NGTD  - cefepime (2/16- )   - ID on consult: midline, IV cefepime x10 days on discharge, home with [facility]     # Essential hypertension  - Blood pressure controlled with home medications.     # Hyperlipidemia  - Continue home statin    # COVID-19 test positive  - No respiratory symptoms at this time, patient out of window for Paxlovid as well.    - Patient would like to be tested prior to discharge so that she can live again with her  who has severe COPD.     # Morbid obesity  - BMI 47     Diet: regular  PT/OT: deferred  DVT ppx: heparin  Code status: Full  Dispo: per ID    MDM: high Ry Love MD, PhD  Message via Secure Chat  [facility] Hospitalist

## 2024-02-17 NOTE — PROGRESS NOTES
INFECTIOUS DISEASE PROGRESS NOTE    Qing Miranda Patient Status:  Inpatient    1955 MRN [id number]   Location [facility]5W Attending Ry Love MD   Hosp Day # 1 PCP Christos Shore MD       SUBJECTIVE  ROS done. Frequency better. No f/c. Tolerating ax.    ASSESSMENT/PLAN:    Antibiotics: IV cefepime     # Acute UTI with pyelo with PSAR - macrobid PTA               - CT A/P w/o stones  # COVID-19 diagnosed  with sinusitis sx x3 weeks s/p doxy and Augmentin  # HTN, HLD, morbid obesity        PLAN:     -  IV cefepime  -  f/up on cx  -  plan for IV cefepime x10 days on discharge  -  Follow fever curve, wbc  -  Reviewed labs, micro, imaging reports, available old records  -  d/w patient, RN     History of Present Illness:  Qnig Miranda is a a(n) 69 year old female. Patient is a 69-year-old female with a history of HTN, HLD, morbid obesity with a BMI of 47 who was recently seen in the ED on  with fever, low back pain, urinary frequency w/o f/c, dysuria.  Found to be COVID-19 positive.  UA with pyuria although no bacteria seen urine culture came back as Pseudomonas.  Was discharged on Macrobid.  Blood cultures came back as permitted since has a recorded allergy to Cipro with hives.  Currently asymptomatic.  No fever or leukocytosis.    OBJECTIVE  /69 (BP Location: Right arm)   Pulse 75   Temp 97 °F (36.1 °C) (Oral)   Resp 18   Ht 154.9 cm (5' 1\")   Wt 250 lb (113.4 kg)   SpO2 96%   BMI 47.24 kg/m²     General: No acute distress. Alert.  HEENT: EOMI   Abdomen: Soft, nontender, nondistended.   Musculoskeletal: No edema  Integument: No rashes        MD Eduardo [facility]  [phone]

## 2024-02-17 NOTE — PLAN OF CARE
Problem: Patient Centered Care  Goal: Patient preferences are identified and integrated in the patient's plan of care  Description: Interventions:  - What would you like us to know as we care for you? I live at home w/ my .  - Provide timely, complete, and accurate information to patient/family  - Incorporate patient and family knowledge, values, beliefs, and cultural backgrounds into the planning and delivery of care  - Encourage patient/family to participate in care and decision-making at the level they choose  - Honor patient and family perspectives and choices  Outcome: Progressing     Problem: Patient/Family Goals  Goal: Patient/Family Long Term Goal  Description: Patient's Long Term Goal: To feel better and return back home w/ my .    Interventions:  -Assess vital signs routinely  -Administer IV Antibiotics as scheduled  -Administer scheduled & PRN medications  -Diagnostic exams  -Routine blood work, report abnormal values to MD(s)  -Update pt on plan of care & give clinical updates  -Assist in discharge planning  - See additional Care Plan goals for specific interventions  Outcome: Progressing     Problem: RESPIRATORY - ADULT  Goal: Achieves optimal ventilation and oxygenation  Description: INTERVENTIONS:  - Assess for changes in respiratory status  - Assess for changes in mentation and behavior  - Position to facilitate oxygenation and minimize respiratory effort  - Oxygen supplementation based on oxygen saturation or ABGs  - Provide Smoking Cessation handout, if applicable  - Encourage broncho-pulmonary hygiene including cough, deep breathe, Incentive Spirometry  - Assess the need for suctioning and perform as needed  - Assess and instruct to report SOB or any respiratory difficulty  - Respiratory Therapy support as indicated  - Manage/alleviate anxiety  - Monitor for signs/symptoms of CO2 retention  Outcome: Progressing       VSS. A/Ox4. C/o headache relived by PRN norco. RA. Pt  independent. Walks to bathroom self. No issues over night will cont to monitor.

## 2024-02-18 PROCEDURE — 99232 SBSQ HOSP IP/OBS MODERATE 35: CPT | Performed by: HOSPITALIST

## 2024-02-18 NOTE — SPIRITUAL CARE NOTE
Spiritual Care Visit Note    Patient Name: Qing Miranda Date of Spiritual Care Visit: 24   : 1955 Primary Dx: Acute cystitis without hematuria       Referred By: Referral From: Nurse    Spiritual Care Taxonomy:     responded to call from nurse to bring patient communion.  brought patient communion and provided pastoral care.  can be reached through EPIC or by phone.        Methods: Assist with spiritual/Congregation practices;Encourage self care;Offer spiritual/Congregation support    Interventions: Explain  role;Perform a Congregation rite or ritual;Acknowledge current situation    Visit Type/Summary:     - Spiritual Care: Responded to a request via the on call phone Consulted with RN prior to visit. Offered empathic listening and emotional support. Provided support for Patient's spiritual/Congregation requests.  remains available for follow up.    Spiritual Care support can be requested via an [facility] consult. For urgent/immediate needs, please contact the On Call  at: [place]: ext [phone]    Maria C Gutierrez MDiv

## 2024-02-18 NOTE — CM/SW NOTE
SW called [facility] to confirmed if they are able to deliver abx today, answering service responded that someone will call back.  Per SW's note, [facility] confirmed that they are able to provided RN services, but nothing was confirmed in aidin messages. SW initiated tent home health referrals via aidin for RN only for IV ABX. SW placed f2f. Assigned SW will need to confirm if infusion company is able to provide RN services with T &T to the patient, if not, then HH RN can do so.     Plan: Pending medical clearance, DC to Home with [facility] , *confirm delivery time,  HH* list/choice    SW/CM to remain available for support and/or discharge planning.     Mel Newsome, MSW, LSW   x [phone]

## 2024-02-18 NOTE — PLAN OF CARE
Pt A&O x4, discharge ready, pt will go home with daughter while covid + and requests another covid test before discharge. Pt's  has a hx of COPD, pt wants to be cautious to avoid  while covid + extended length placed, will remain in place for discharge. Pt needs medication delivery confirmation from [facility] services. Pt is self care, calls appropriately, fall precautions in place. Rounded hourly overnight.       Problem: Patient Centered Care  Goal: Patient preferences are identified and integrated in the patient's plan of care  Description: Interventions:  - What would you like us to know as we care for you? I live at home w/ my .  - Provide timely, complete, and accurate information to patient/family  - Incorporate patient and family knowledge, values, beliefs, and cultural backgrounds into the planning and delivery of care  - Encourage patient/family to participate in care and decision-making at the level they choose  - Honor patient and family perspectives and choices  Outcome: Progressing     Problem: Patient/Family Goals  Goal: Patient/Family Long Term Goal  Description: Patient's Long Term Goal: To feel better and return back home w/ my .    Interventions:  -Assess vital signs routinely  -Administer IV Antibiotics as scheduled  -Administer scheduled & PRN medications  -Diagnostic exams  -Routine blood work, report abnormal values to MD(s)  -Update pt on plan of care & give clinical updates  -Assist in discharge planning  - See additional Care Plan goals for specific interventions  Outcome: Progressing  Goal: Patient/Family Short Term Goal  Description: Patient's Short Term Goal: To treat my UTI w/ IV Antibiotics     Interventions:   -Assess vital signs routinely  -Administer IV Antibiotics as scheduled  -Administer scheduled & PRN medications  -Diagnostic exams  -Routine blood work, report abnormal values to MD(s)  -Update pt on plan of care & give clinical updates  -Assist in  discharge planning    - See additional Care Plan goals for specific interventions  Outcome: Progressing     Problem: RESPIRATORY - ADULT  Goal: Achieves optimal ventilation and oxygenation  Description: INTERVENTIONS:  - Assess for changes in respiratory status  - Assess for changes in mentation and behavior  - Position to facilitate oxygenation and minimize respiratory effort  - Oxygen supplementation based on oxygen saturation or ABGs  - Provide Smoking Cessation handout, if applicable  - Encourage broncho-pulmonary hygiene including cough, deep breathe, Incentive Spirometry  - Assess the need for suctioning and perform as needed  - Assess and instruct to report SOB or any respiratory difficulty  - Respiratory Therapy support as indicated  - Manage/alleviate anxiety  - Monitor for signs/symptoms of CO2 retention  Outcome: Progressing     Problem: CARDIOVASCULAR - ADULT  Goal: Maintains optimal cardiac output and hemodynamic stability  Description: INTERVENTIONS:  - Monitor vital signs, rhythm, and trends  - Monitor for bleeding, hypotension and signs of decreased cardiac output  - Evaluate effectiveness of vasoactive medications to optimize hemodynamic stability  - Monitor arterial and/or venous puncture sites for bleeding and/or hematoma  - Assess quality of pulses, skin color and temperature  - Assess for signs of decreased coronary artery perfusion - ex. Angina  - Evaluate fluid balance, assess for edema, trend weights  Outcome: Progressing  Goal: Absence of cardiac arrhythmias or at baseline  Description: INTERVENTIONS:  - Continuous cardiac monitoring, monitor vital signs, obtain 12 lead EKG if indicated  - Evaluate effectiveness of antiarrhythmic and heart rate control medications as ordered  - Initiate emergency measures for life threatening arrhythmias  - Monitor electrolytes and administer replacement therapy as ordered  Outcome: Progressing     Problem: PAIN - ADULT  Goal: Verbalizes/displays adequate  comfort level or patient's stated pain goal  Description: INTERVENTIONS:  - Encourage pt to monitor pain and request assistance  - Assess pain using appropriate pain scale  - Administer analgesics based on type and severity of pain and evaluate response  - Implement non-pharmacological measures as appropriate and evaluate response  - Consider cultural and social influences on pain and pain management  - Manage/alleviate anxiety  - Utilize distraction and/or relaxation techniques  - Monitor for opioid side effects  - Notify MD/LIP if interventions unsuccessful or patient reports new pain  - Anticipate increased pain with activity and pre-medicate as appropriate  Outcome: Progressing     Problem: GENITOURINARY - ADULT  Goal: Absence of urinary retention  Description: INTERVENTIONS:  - Assess patient’s ability to void and empty bladder  - Monitor intake/output and perform bladder scan as needed  - Follow urinary retention protocol/standard of care  - Consider collaborating with pharmacy to review patient's medication profile  - Implement strategies to promote bladder emptying  Outcome: Progressing     Problem: METABOLIC/FLUID AND ELECTROLYTES - ADULT  Goal: Electrolytes maintained within normal limits  Description: INTERVENTIONS:  - Monitor labs and rhythm and assess patient for signs and symptoms of electrolyte imbalances  - Administer electrolyte replacement as ordered  - Monitor response to electrolyte replacements, including rhythm and repeat lab results as appropriate  - Fluid restriction as ordered  - Instruct patient on fluid and nutrition restrictions as appropriate  Outcome: Progressing  Goal: Hemodynamic stability and optimal renal function maintained  Description: INTERVENTIONS:  - Monitor labs and assess for signs and symptoms of volume excess or deficit  - Monitor intake, output and patient weight  - Monitor urine specific gravity, serum osmolarity and serum sodium as indicated or ordered  - Monitor  response to interventions for patient's volume status, including labs, urine output, blood pressure (other measures as available)  - Encourage oral intake as appropriate  - Instruct patient on fluid and nutrition restrictions as appropriate  Outcome: Progressing     Problem: SKIN/TISSUE INTEGRITY - ADULT  Goal: Skin integrity remains intact  Description: INTERVENTIONS  - Assess and document risk factors for pressure ulcer development  - Assess and document skin integrity  - Monitor for areas of redness and/or skin breakdown  - Initiate interventions, skin care algorithm/standards of care as needed  Outcome: Progressing  Goal: Oral mucous membranes remain intact  Description: INTERVENTIONS  - Assess oral mucosa and hygiene practices  - Implement preventative oral hygiene regimen  - Implement oral medicated treatments as ordered  Outcome: Progressing

## 2024-02-18 NOTE — PROGRESS NOTES
[facility]    Progress Note    Qing Miranda Patient Status:  Inpatient    1955 MRN [id number]   Location [facility]5W Attending Ry Love MD   Hosp Day # 2 PCP Christos Shore MD       Subjective:   Qing Miranda rests in bed comfortably, no new complaints, await ins auth for home IV cefepime. Discussed with ID and RN.    Review of Systems:   10 point ROS completed and was negative, except for pertinent positive and negatives stated in subjective.    Objective:     Vitals:    24 2103 24 2355 24 0435 24 0807   BP: 135/74 133/61 135/66 124/61   BP Location: Right arm Right arm Right arm Right arm   Pulse: 81 71 64 83   Resp:    Temp: 98.5 °F (36.9 °C) 98.4 °F (36.9 °C) 97.5 °F (36.4 °C) 97 °F (36.1 °C)   TempSrc: Oral Oral Oral Oral   SpO2: 96% 93% 90% 95%   Weight:       Height:         Patient Weight for the past 72 hrs:   Weight   02/15/24 2035 250 lb (113.4 kg)   24 0056 250 lb (113.4 kg)       Intake/Output Summary (Last 24 hours) at 2024 0832  Last data filed at 2024 0515  Gross per 24 hour   Intake 900 ml   Output --   Net 900 ml       GENERAL:  The patient appeared to be in no distress   SKIN:  Warm and hydrated  HEENT:  Head was atraumatic and normocephalic.    CHEST:  Symmetrical movement on inspiration  LUNGS:  No audible wheezing  ABDOMEN: Non-distended  MUSCULOSKELETAL:  There was no deformity.    EXTREMITIES: There was no edema  NEUROLOGICAL:  There was no focal deficit.    PSYCHIATRIC: Calm and cooperative     Current Scheduled Inpatient Meds:      atorvastatin  20 mg Oral Nightly    heparin  5,000 Units Subcutaneous Q12H    cefepime  1 g Intravenous Q8H    losartan  50 mg Oral Daily    And    hydrochlorothiazide  12.5 mg Oral Daily    fluticasone propionate  1 spray Each Nare Daily       Current PRN Inpatient Meds:      benzocaine, ondansetron, acetaminophen, hydrALAzine, HYDROcodone-acetaminophen,  zolpidem, alum-mag hydroxide-simethicone, influenza virus vaccine PF    Results:     Lab Results   Component Value Date    WBC 10.0 02/16/2024    HGB 12.3 02/16/2024    HCT 39.2 02/16/2024    .0 02/16/2024    CREATSERUM 0.75 02/16/2024    BUN 23 02/16/2024     02/16/2024    K 4.3 02/16/2024     02/16/2024    CO2 26.0 02/16/2024     (H) 02/16/2024    CA 8.8 02/16/2024    ALB 4.2 02/12/2024    ALKPHO 99 02/12/2024    BILT 0.3 02/12/2024    TP 7.0 02/12/2024    AST 13 02/12/2024    ALT 9 (L) 02/12/2024    T4F 1.0 03/10/2023    TSH 5.080 (H) 03/10/2023    LIP 65 (L) 10/07/2021    MG 1.9 10/10/2021    PHOS 3.7 11/30/2021    B12 489 04/29/2021       Recent Labs   Lab 02/12/24  2029 02/15/24  2109 02/16/24  0505   RBC 4.37 4.58 4.56   HGB 11.7* 12.8 12.3   HCT 36.9 38.8 39.2   MCV 84.4 84.7 86.0   MCH 26.8 27.9 27.0   MCHC 31.7 33.0 31.4   RDW 13.7 13.9 13.8   NEPRELIM 4.98 6.09 5.84   WBC 7.4 9.9 10.0   .0 422.0 371.0     Recent Labs   Lab 02/12/24  2028 02/15/24  2110 02/16/24  0505   * 103* 111*   BUN 17 23 23   CREATSERUM 0.75 0.73 0.75   CA 9.1 9.1 8.8   ALB 4.2  --   --     137 140   K 4.0 4.1 4.3    108 107   CO2 25.0 26.0 26.0   ALKPHO 99  --   --    AST 13  --   --    ALT 9*  --   --    BILT 0.3  --   --    TP 7.0  --   --      No results found for: \"PT\", \"INR\"    Culture:  Hospital Encounter on 02/15/24   1. Urine Culture, Routine     Status: None    Collection Time: 02/15/24 11:12 PM    Specimen: Urine, clean catch   Result Value Ref Range    Urine Culture No Growth at 18-24 hrs. N/A   2. Blood Culture     Status: None (Preliminary result)    Collection Time: 02/15/24  9:14 PM    Specimen: Blood,peripheral   Result Value Ref Range    Blood Culture Result No Growth 2 Days N/A       Imaging/EKG:   No results found.      Assessment and Plan:   Qing Miranda is a 69-year-old female with hx of frequent UTIs, renal calculi, hypertension and hyperlipidemia, who was  called back to hospital for positive urine culture for Pseudomonas.    # Complicated UTI, Pseudomonas   # Recurrent UTI  - patient presented to ED 2/12/24 for urinary frequency, UCx obtained, sent home with nitrofurantoin  - UA 2/12/24 has no bacteria or nitrite, but some WBC  - UCx 2/12/4 grew 10,000 - 50,000 CFU/ML Pseudomonas aeruginosa, sensitive to cefepime and fluoroquinolone; patient developed hives with ciprofloxacin.  - patient has no fever, leukocytosis, or bladder wall thickening on CT; she drinks a lot water and urinates frequently.  - urine culture and blood culture 2/15 NGTD  - cefepime (2/16- )   - ID on consult: midline, IV cefepime x10 days on discharge, home with [facility].    # Essential hypertension  - Blood pressure controlled with home medications.     # Hyperlipidemia  - Continue home statin    # COVID-19 test positive  - No respiratory symptoms at this time, patient out of window for Paxlovid as well.    - Patient would like to be tested prior to discharge so that she can live again with her  who has severe COPD.     # Morbid obesity  - BMI 47     Diet: regular  PT/OT: deferred  DVT ppx: heparin  Code status: Full  Dispo: per ins auth for home IV abx. Ready for discharge.    Ry Love MD, PhD  Message via Secure Chat  [facility] Hospitalist

## 2024-02-19 VITALS
WEIGHT: 250 LBS | TEMPERATURE: 99 F | BODY MASS INDEX: 47.2 KG/M2 | DIASTOLIC BLOOD PRESSURE: 62 MMHG | HEART RATE: 85 BPM | RESPIRATION RATE: 18 BRPM | OXYGEN SATURATION: 95 % | SYSTOLIC BLOOD PRESSURE: 143 MMHG | HEIGHT: 61 IN

## 2024-02-19 LAB — SARS-COV-2 RNA RESP QL NAA+PROBE: DETECTED

## 2024-02-19 PROCEDURE — 99239 HOSP IP/OBS DSCHRG MGMT >30: CPT | Performed by: HOSPITALIST

## 2024-02-19 NOTE — CM/SW NOTE
TISHA followed up on discharge planning.    TISHA attached Midline information to infusion referral.    TISHA confirmed with liaison Kristen from [facility] that [facility] can provide weekly lab draws and line care.    Kristen confirmed they will begin SOC  tomorrow for first home dose.    TISHA attached signed face to face and recent CBC/BMP lab results to referral.    Per [facility] Simon, COVID results are pending.  Pt will either discharge to her home or to her daughter's home based on + or - results.    *Pt positive for COVID 9:30 AM*    As previously noted, pt will have a $243.40 co-pay (may be slightly lower now due to no weekend discharge).  Pt's [employer] med plan does not cover home infusion.  [facility] will bill pt.    TISHA spoke to pt bedside and confirmed final DC plan/copay.  Final co-pay $198.40.  Pt v/u.  Pt confirmed her daughter will transport her home as she is staying with her dtr based on COVID + results.    TISHA confirmed [facility] has contacted pt for home delivery/initial call.    PLAN: DC home w/[facility]/nursing through [facility]    / to remain available for support and/or discharge planning.     Heather Lerner MSW, LSW [id number]

## 2024-02-19 NOTE — PLAN OF CARE
Pt A&Ox4, planned for poss discharge today pending next COVID results, as well as pending HH and medication delivery. Pt is self care, calls appropriately for assistance, fall precautions in place.       Problem: Patient Centered Care  Goal: Patient preferences are identified and integrated in the patient's plan of care  Description: Interventions:  - What would you like us to know as we care for you? I live at home w/ my .  - Provide timely, complete, and accurate information to patient/family  - Incorporate patient and family knowledge, values, beliefs, and cultural backgrounds into the planning and delivery of care  - Encourage patient/family to participate in care and decision-making at the level they choose  - Honor patient and family perspectives and choices  Outcome: Progressing     Problem: Patient/Family Goals  Goal: Patient/Family Long Term Goal  Description: Patient's Long Term Goal: To feel better and return back home w/ my .    Interventions:  -Assess vital signs routinely  -Administer IV Antibiotics as scheduled  -Administer scheduled & PRN medications  -Diagnostic exams  -Routine blood work, report abnormal values to MD(s)  -Update pt on plan of care & give clinical updates  -Assist in discharge planning  - See additional Care Plan goals for specific interventions  Outcome: Progressing  Goal: Patient/Family Short Term Goal  Description: Patient's Short Term Goal: To treat my UTI w/ IV Antibiotics     Interventions:   -Assess vital signs routinely  -Administer IV Antibiotics as scheduled  -Administer scheduled & PRN medications  -Diagnostic exams  -Routine blood work, report abnormal values to MD(s)  -Update pt on plan of care & give clinical updates  -Assist in discharge planning    - See additional Care Plan goals for specific interventions  Outcome: Progressing     Problem: RESPIRATORY - ADULT  Goal: Achieves optimal ventilation and oxygenation  Description: INTERVENTIONS:  - Assess  for changes in respiratory status  - Assess for changes in mentation and behavior  - Position to facilitate oxygenation and minimize respiratory effort  - Oxygen supplementation based on oxygen saturation or ABGs  - Provide Smoking Cessation handout, if applicable  - Encourage broncho-pulmonary hygiene including cough, deep breathe, Incentive Spirometry  - Assess the need for suctioning and perform as needed  - Assess and instruct to report SOB or any respiratory difficulty  - Respiratory Therapy support as indicated  - Manage/alleviate anxiety  - Monitor for signs/symptoms of CO2 retention  Outcome: Progressing     Problem: CARDIOVASCULAR - ADULT  Goal: Maintains optimal cardiac output and hemodynamic stability  Description: INTERVENTIONS:  - Monitor vital signs, rhythm, and trends  - Monitor for bleeding, hypotension and signs of decreased cardiac output  - Evaluate effectiveness of vasoactive medications to optimize hemodynamic stability  - Monitor arterial and/or venous puncture sites for bleeding and/or hematoma  - Assess quality of pulses, skin color and temperature  - Assess for signs of decreased coronary artery perfusion - ex. Angina  - Evaluate fluid balance, assess for edema, trend weights  Outcome: Progressing  Goal: Absence of cardiac arrhythmias or at baseline  Description: INTERVENTIONS:  - Continuous cardiac monitoring, monitor vital signs, obtain 12 lead EKG if indicated  - Evaluate effectiveness of antiarrhythmic and heart rate control medications as ordered  - Initiate emergency measures for life threatening arrhythmias  - Monitor electrolytes and administer replacement therapy as ordered  Outcome: Progressing     Problem: PAIN - ADULT  Goal: Verbalizes/displays adequate comfort level or patient's stated pain goal  Description: INTERVENTIONS:  - Encourage pt to monitor pain and request assistance  - Assess pain using appropriate pain scale  - Administer analgesics based on type and severity of  pain and evaluate response  - Implement non-pharmacological measures as appropriate and evaluate response  - Consider cultural and social influences on pain and pain management  - Manage/alleviate anxiety  - Utilize distraction and/or relaxation techniques  - Monitor for opioid side effects  - Notify MD/LIP if interventions unsuccessful or patient reports new pain  - Anticipate increased pain with activity and pre-medicate as appropriate  Outcome: Progressing     Problem: GENITOURINARY - ADULT  Goal: Absence of urinary retention  Description: INTERVENTIONS:  - Assess patient’s ability to void and empty bladder  - Monitor intake/output and perform bladder scan as needed  - Follow urinary retention protocol/standard of care  - Consider collaborating with pharmacy to review patient's medication profile  - Implement strategies to promote bladder emptying  Outcome: Progressing     Problem: METABOLIC/FLUID AND ELECTROLYTES - ADULT  Goal: Electrolytes maintained within normal limits  Description: INTERVENTIONS:  - Monitor labs and rhythm and assess patient for signs and symptoms of electrolyte imbalances  - Administer electrolyte replacement as ordered  - Monitor response to electrolyte replacements, including rhythm and repeat lab results as appropriate  - Fluid restriction as ordered  - Instruct patient on fluid and nutrition restrictions as appropriate  Outcome: Progressing  Goal: Hemodynamic stability and optimal renal function maintained  Description: INTERVENTIONS:  - Monitor labs and assess for signs and symptoms of volume excess or deficit  - Monitor intake, output and patient weight  - Monitor urine specific gravity, serum osmolarity and serum sodium as indicated or ordered  - Monitor response to interventions for patient's volume status, including labs, urine output, blood pressure (other measures as available)  - Encourage oral intake as appropriate  - Instruct patient on fluid and nutrition restrictions as  appropriate  Outcome: Progressing     Problem: SKIN/TISSUE INTEGRITY - ADULT  Goal: Skin integrity remains intact  Description: INTERVENTIONS  - Assess and document risk factors for pressure ulcer development  - Assess and document skin integrity  - Monitor for areas of redness and/or skin breakdown  - Initiate interventions, skin care algorithm/standards of care as needed  Outcome: Progressing  Goal: Oral mucous membranes remain intact  Description: INTERVENTIONS  - Assess oral mucosa and hygiene practices  - Implement preventative oral hygiene regimen  - Implement oral medicated treatments as ordered  Outcome: Progressing

## 2024-02-19 NOTE — DISCHARGE SUMMARY
[facility]    Discharge Summary    Qing Miranda Patient Status:  Inpatient    1955 MRN [id number]   Location [facility]5W Attending Ry Love MD   Hosp Day # 3 PCP Christos Shore MD     Date of Admission: 2/15/2024     Date of Discharge: 24     Hospital Discharge Diagnoses:  Complicated UTI    Lace+ Score: 50  59-90 High Risk  29-58 Medium Risk  0-28   Low Risk.    TCM Follow-Up Recommendation:  LACE 29-58: Moderate Risk of readmission after discharge from the hospital.    HPI Per Admitting Physician: Qing Miranda is a(n) 69 year old female, with past medical history significant for frequent UTIs, renal calculi hypertension and hyperlipidemia was called into the ER after her urine culture returned positive for Pseudomonas sensitive to Cipro however patient is allergic to quinolones. She had come to the ER earlier with complaint of fever chills and dysuria at that time was started on Macrobid for presumptive UTI, results now indicate Pseudomonas in her urine which will require IV antibiotics considering her Cipro allergy. Claims her symptoms have somewhat improved since starting the Macrobid however continues to have increased urinary frequency.    Hospital Course: Qing Miranda is a 69-year-old female with hx of frequent UTIs, renal calculi, hypertension and hyperlipidemia, who was called back to hospital for positive urine culture for Pseudomonas.     # Complicated UTI, Pseudomonas   # Recurrent UTI  - patient presented to ED 24 for urinary frequency, UCx obtained, sent home with nitrofurantoin  - UA 24 has no bacteria or nitrite, but some WBC  - UCx  grew 10,000 - 50,000 CFU/ML Pseudomonas aeruginosa, sensitive to cefepime and fluoroquinolone; patient developed hives with ciprofloxacin.  - patient has no fever, leukocytosis, or bladder wall thickening on CT; she drinks a lot water and urinates frequently.  - urine culture and blood  culture 2/15 were both negative.  - cefepime (2/16- )   - ID on consult: midline, IV cefepime x10 days on discharge, home with [email or web address].     # Essential hypertension  - Blood pressure controlled with home medications.     # Hyperlipidemia  - Continue home statin     # COVID-19 test positive  - No respiratory symptoms at this time, patient out of window for Paxlovid as well.    - Patient would like to be tested prior to discharge so that she can live again with her  who has severe COPD. It was positive as expected.     # Morbid obesity  - BMI 47     Code status: Full  Dispo: home       Physical Examination:  Vital Signs:  Blood pressure 136/55, pulse 71, temperature 97.9 °F (36.6 °C), temperature source Oral, resp. rate 20, height 5' 1\" (1.549 m), weight 250 lb (113.4 kg), SpO2 95%, not currently breastfeeding.     GENERAL:  The patient appeared to be in no distress.  Lying in bed, comfortably.  SKIN:  Warm and hydrated  HEENT:  Head was atraumatic and normocephalic.    NECK:  Supple.  There was no JVD.   HEART:  S1 and S2 heard.  RRR   LUNGS:  Air entry was good.  No crackles or wheezes   ABDOMEN: Soft and non-tender.  Bowel sounds were present.  MUSCULOSKELETAL:  There was no deformity.    EXTREMITIES: There was no edema, clubbing or cyanosis  NEUROLOGICAL:  There was no focal deficit.  Cranial nerves II through XII were intact.  PSYCHIATRIC: Calm and cooperative     CULTURE:   Hospital Encounter on 02/15/24   1. Urine Culture, Routine     Status: None    Collection Time: 02/15/24 11:12 PM    Specimen: Urine, clean catch   Result Value Ref Range    Urine Culture No Growth at 18-24 hrs. N/A   2. Blood Culture     Status: None (Preliminary result)    Collection Time: 02/15/24  9:14 PM    Specimen: Blood,peripheral   Result Value Ref Range    Blood Culture Result No Growth 3 Days N/A       IMAGING STUDIES:   CT ABDOMEN+PELVIS KIDNEYSTONE 2D RNDR(NO IV,NO ORAL)(CPT=74176)    Result Date:  2/16/2024  CONCLUSION:  1. No evidence of nephroureterolithiasis or obstructive uropathy.  2. No acute intra-abdominal process is identified by noncontrast CT technique. The etiology of the patient's symptoms is unclear from this study.  3. Pancolonic diverticulosis without CT evidence acute complication.  4. Hepatomegaly with suspected underlying hepatic steatosis.   5. Status post cholecystectomy without significant hepatobiliary dilatation.  6. Stable right lower lobe pulmonary nodule.  7. Lesser incidental findings as above.    A preliminary report was issued by the [facility] teleradiology service. There are no major discrepancies.   Dictated by (CST): Lloyd Ford MD on 2/16/2024 at 9:45 AM     Finalized by (CST): Lloyd Ford MD on 2/16/2024 at 9:52 AM          XR CHEST PA + LAT CHEST (CPT=71046)    Result Date: 2/5/2024  CONCLUSION: No acute cardiopulmonary abnormality.   Dictated by (CST): Almas Ogden MD on 2/05/2024 at 2:22 PM     Finalized by (CST): Almas Ogden MD on 2/05/2024 at 2:23 PM           LABS :     Lab Results   Component Value Date    WBC 10.0 02/16/2024    HGB 12.3 02/16/2024    HCT 39.2 02/16/2024    .0 02/16/2024    CREATSERUM 0.75 02/16/2024    BUN 23 02/16/2024     02/16/2024    K 4.3 02/16/2024     02/16/2024    CO2 26.0 02/16/2024     (H) 02/16/2024    CA 8.8 02/16/2024    ALB 4.2 02/12/2024    ALKPHO 99 02/12/2024    BILT 0.3 02/12/2024    TP 7.0 02/12/2024    AST 13 02/12/2024    ALT 9 (L) 02/12/2024    T4F 1.0 03/10/2023    TSH 5.080 (H) 03/10/2023    LIP 65 (L) 10/07/2021    MG 1.9 10/10/2021    PHOS 3.7 11/30/2021    B12 489 04/29/2021       Recent Labs   Lab 02/12/24  2029 02/15/24  2109 02/16/24  0505   RBC 4.37 4.58 4.56   HGB 11.7* 12.8 12.3   HCT 36.9 38.8 39.2   MCV 84.4 84.7 86.0   MCH 26.8 27.9 27.0   MCHC 31.7 33.0 31.4   RDW 13.7 13.9 13.8   NEPRELIM 4.98 6.09 5.84   WBC 7.4 9.9 10.0   .0 422.0 371.0     Recent Labs   Lab  02/12/24  2028 02/15/24  2110 02/16/24  0505   * 103* 111*   BUN 17 23 23   CREATSERUM 0.75 0.73 0.75   CA 9.1 9.1 8.8   ALB 4.2  --   --     137 140   K 4.0 4.1 4.3    108 107   CO2 25.0 26.0 26.0   ALKPHO 99  --   --    AST 13  --   --    ALT 9*  --   --    BILT 0.3  --   --    TP 7.0  --   --      No results found for: \"PT\", \"INR\"    Discharge Medications:      Discharge Medications        START taking these medications        Instructions Prescription details   ceFEPIme 1 g in sodium chloride 100 mL      Inject 1 g into the vein every 8 (eight) hours for 10 days. ICD10: N10  WEEKLY CBC W/DIFF, CMP  FAX LABS TO DR. COLLNIS [phone]   Stop taking on: February 26, 2024  Quantity: 1 each  Refills: 0            CONTINUE taking these medications        Instructions Prescription details   atorvastatin 20 MG Tabs  Commonly known as: Lipitor      Take 1 tablet (20 mg total) by mouth nightly.   Quantity: 90 tablet  Refills: 0     BD Luer-Richa Syringe 25G X 1\" 3 ML Misc  Generic drug: Syringe/Needle (Disp)      USE TWICE A MONTH AS DIRECTED   Quantity: 24 each  Refills: 2     benzonatate 100 MG Caps  Commonly known as: Tessalon      Take 1 capsule (100 mg total) by mouth 3 (three) times daily as needed.   Quantity: 30 capsule  Refills: 0     cyanocobalamin 1000 MCG/ML Soln  Commonly known as: Vitamin B12      INJECT 1 ML INTO THE MUSCLE EVERY 15 DAYS   Quantity: 24 mL  Refills: 1     Doxycycline Monohydrate 100 MG Tabs      Take 100 mg by mouth 2 (two) times daily.   Quantity: 14 tablet  Refills: 0     fluticasone propionate 50 MCG/ACT Susp  Commonly known as: Flonase      SHAKE LIQUID AND USE 2 SPRAYS IN EACH NOSTRIL DAILY   Quantity: 1 each  Refills: 2     furosemide 20 MG Tabs  Commonly known as: Lasix      Take 1 tablet (20 mg total) by mouth daily as needed.   Quantity: 30 tablet  Refills: 0     IRRITATED EYE RELIEF OP      Apply to eye.   Refills: 0     nitrofurantoin monohydrate macro 100 MG  Caps  Commonly known as: Macrobid      Take 1 capsule (100 mg total) by mouth 2 (two) times daily for 10 days.   Stop taking on: February 22, 2024  Quantity: 20 capsule  Refills: 0     Potassium Chloride ER 10 MEQ Tbcr  Commonly known as: K-DUR      Take 1 tablet (10 mEq total) by mouth daily as needed.   Quantity: 30 tablet  Refills: 0            STOP taking these medications      amoxicillin clavulanate 875-125 MG Tabs  Commonly known as: Augmentin        cloNIDine 0.1 MG Tabs  Commonly known as: Catapres        Irbesartan-hydroCHLOROthiazide 150-12.5 MG Tabs  Commonly known as: Avalide        triamcinolone 0.1 % Crea  Commonly known as: Kenalog                  Where to Get Your Medications        Please  your prescriptions at the location directed by your doctor or nurse    Bring a paper prescription for each of these medications  ceFEPIme 1 g in sodium chloride 100 mL         Follow up Visits  Christos Shore MD  [address] [place] [address]  [phone]    Follow up in 1 week(s)        Consultants         Provider   Role Specialty     Uday Hutchinson MD  Consulting Physician INFECTIOUS DISEASES            ----------------------------------------------------  35 MIN SPENT ON THIS DISCHARGE   2/19/2024  7:40 AM    Ry Love MD, PhD  [facility]

## 2024-02-19 NOTE — PLAN OF CARE
Patient is AO x 4 on RA no complaints of pain. Call light in place. Pt to DC today after 1400 dose of antibiotics with midline for long term abx, discharge education completed. No questions or concerns at this time.     Problem: Patient Centered Care  Goal: Patient preferences are identified and integrated in the patient's plan of care  Description: Interventions:  - What would you like us to know as we care for you? I live at home w/ my .  - Provide timely, complete, and accurate information to patient/family  - Incorporate patient and family knowledge, values, beliefs, and cultural backgrounds into the planning and delivery of care  - Encourage patient/family to participate in care and decision-making at the level they choose  - Honor patient and family perspectives and choices  Outcome: Progressing     Problem: Patient/Family Goals  Goal: Patient/Family Long Term Goal  Description: Patient's Long Term Goal: To feel better and return back home w/ my .    Interventions:  -Assess vital signs routinely  -Administer IV Antibiotics as scheduled  -Administer scheduled & PRN medications  -Diagnostic exams  -Routine blood work, report abnormal values to MD(s)  -Update pt on plan of care & give clinical updates  -Assist in discharge planning  - See additional Care Plan goals for specific interventions  Outcome: Progressing  Goal: Patient/Family Short Term Goal  Description: Patient's Short Term Goal: To treat my UTI w/ IV Antibiotics     Interventions:   -Assess vital signs routinely  -Administer IV Antibiotics as scheduled  -Administer scheduled & PRN medications  -Diagnostic exams  -Routine blood work, report abnormal values to MD(s)  -Update pt on plan of care & give clinical updates  -Assist in discharge planning    - See additional Care Plan goals for specific interventions  Outcome: Progressing     Problem: RESPIRATORY - ADULT  Goal: Achieves optimal ventilation and oxygenation  Description:  INTERVENTIONS:  - Assess for changes in respiratory status  - Assess for changes in mentation and behavior  - Position to facilitate oxygenation and minimize respiratory effort  - Oxygen supplementation based on oxygen saturation or ABGs  - Provide Smoking Cessation handout, if applicable  - Encourage broncho-pulmonary hygiene including cough, deep breathe, Incentive Spirometry  - Assess the need for suctioning and perform as needed  - Assess and instruct to report SOB or any respiratory difficulty  - Respiratory Therapy support as indicated  - Manage/alleviate anxiety  - Monitor for signs/symptoms of CO2 retention  Outcome: Progressing     Problem: CARDIOVASCULAR - ADULT  Goal: Maintains optimal cardiac output and hemodynamic stability  Description: INTERVENTIONS:  - Monitor vital signs, rhythm, and trends  - Monitor for bleeding, hypotension and signs of decreased cardiac output  - Evaluate effectiveness of vasoactive medications to optimize hemodynamic stability  - Monitor arterial and/or venous puncture sites for bleeding and/or hematoma  - Assess quality of pulses, skin color and temperature  - Assess for signs of decreased coronary artery perfusion - ex. Angina  - Evaluate fluid balance, assess for edema, trend weights  Outcome: Progressing  Goal: Absence of cardiac arrhythmias or at baseline  Description: INTERVENTIONS:  - Continuous cardiac monitoring, monitor vital signs, obtain 12 lead EKG if indicated  - Evaluate effectiveness of antiarrhythmic and heart rate control medications as ordered  - Initiate emergency measures for life threatening arrhythmias  - Monitor electrolytes and administer replacement therapy as ordered  Outcome: Progressing     Problem: PAIN - ADULT  Goal: Verbalizes/displays adequate comfort level or patient's stated pain goal  Description: INTERVENTIONS:  - Encourage pt to monitor pain and request assistance  - Assess pain using appropriate pain scale  - Administer analgesics based on  type and severity of pain and evaluate response  - Implement non-pharmacological measures as appropriate and evaluate response  - Consider cultural and social influences on pain and pain management  - Manage/alleviate anxiety  - Utilize distraction and/or relaxation techniques  - Monitor for opioid side effects  - Notify MD/LIP if interventions unsuccessful or patient reports new pain  - Anticipate increased pain with activity and pre-medicate as appropriate  Outcome: Progressing     Problem: GENITOURINARY - ADULT  Goal: Absence of urinary retention  Description: INTERVENTIONS:  - Assess patient’s ability to void and empty bladder  - Monitor intake/output and perform bladder scan as needed  - Follow urinary retention protocol/standard of care  - Consider collaborating with pharmacy to review patient's medication profile  - Implement strategies to promote bladder emptying  Outcome: Progressing     Problem: METABOLIC/FLUID AND ELECTROLYTES - ADULT  Goal: Electrolytes maintained within normal limits  Description: INTERVENTIONS:  - Monitor labs and rhythm and assess patient for signs and symptoms of electrolyte imbalances  - Administer electrolyte replacement as ordered  - Monitor response to electrolyte replacements, including rhythm and repeat lab results as appropriate  - Fluid restriction as ordered  - Instruct patient on fluid and nutrition restrictions as appropriate  Outcome: Progressing  Goal: Hemodynamic stability and optimal renal function maintained  Description: INTERVENTIONS:  - Monitor labs and assess for signs and symptoms of volume excess or deficit  - Monitor intake, output and patient weight  - Monitor urine specific gravity, serum osmolarity and serum sodium as indicated or ordered  - Monitor response to interventions for patient's volume status, including labs, urine output, blood pressure (other measures as available)  - Encourage oral intake as appropriate  - Instruct patient on fluid and nutrition  restrictions as appropriate  Outcome: Progressing     Problem: SKIN/TISSUE INTEGRITY - ADULT  Goal: Skin integrity remains intact  Description: INTERVENTIONS  - Assess and document risk factors for pressure ulcer development  - Assess and document skin integrity  - Monitor for areas of redness and/or skin breakdown  - Initiate interventions, skin care algorithm/standards of care as needed  Outcome: Progressing  Goal: Oral mucous membranes remain intact  Description: INTERVENTIONS  - Assess oral mucosa and hygiene practices  - Implement preventative oral hygiene regimen  - Implement oral medicated treatments as ordered  Outcome: Progressing

## 2024-02-19 NOTE — CM/SW NOTE
02/19/24 1100   Discharge disposition   Expected discharge disposition Home-Health   Post Acute Care Provider   ([facility])   DME/Infusion Providers   ([facility])   Discharge transportation Private car     SW confirmed with RN Aurora and Dr. Love who stated pt is medically ready for discharge today.  Pt has discharge order.    HH updates/summary attached via [facility] to [facility].  Liaison Kristen from [facility] made aware of discharge through [facility] Messages    SW confirmed that [facility] contact information added to AVS.    PLAN: DC home with [facility] and home health RN through [facility]    Heather Lerner MSW, LSW [phone]

## 2024-02-19 NOTE — DISCHARGE INSTRUCTIONS
Sometimes managing your health at home requires assistance.  The [facility] team has recognized your preference to use [facility].  They can be reached at [phone].  A representative from the home health agency will contact you or your family to schedule delivery of medication.

## 2024-02-19 NOTE — PROGRESS NOTES
INFECTIOUS DISEASE PROGRESS NOTE    Qing Miranda Patient Status:  Inpatient    1955 MRN [id number]   Location [facility]5W Attending Ry Love MD   Hosp Day # 3 PCP Christos Shore MD       SUBJECTIVE  ROS done. Some urinary frequency and back pain. Tolerating abx.    ASSESSMENT/PLAN:    Antibiotics: IV cefepime     # Acute UTI with pyelo with PSAR - macrobid PTA               - CT A/P w/o stones  # COVID-19 diagnosed  with sinusitis sx x3 weeks s/p doxy and Augmentin  # HTN, HLD, morbid obesity        PLAN:     -  IV cefepime  -  f/up on cx  -  plan for IV cefepime x10 days on discharge  -  Follow fever curve, wbc  -  Reviewed labs, micro, imaging reports  -  d/w patient, staff     History of Present Illness:  Qing Miranda is a a(n) 69 year old female. Patient is a 69-year-old female with a history of HTN, HLD, morbid obesity with a BMI of 47 who was recently seen in the ED on  with fever, low back pain, urinary frequency w/o f/c, dysuria.  Found to be COVID-19 positive.  UA with pyuria although no bacteria seen urine culture came back as Pseudomonas.  Was discharged on Macrobid.  Blood cultures came back as permitted since has a recorded allergy to Cipro with hives.  Currently asymptomatic.  No fever or leukocytosis.    OBJECTIVE  /53 (BP Location: Right arm)   Pulse 73   Temp 98.2 °F (36.8 °C) (Oral)   Resp 18   Ht 154.9 cm (5' 1\")   Wt 250 lb (113.4 kg)   SpO2 95%   BMI 47.24 kg/m²     General: No acute distress. Alert.  HEENT: EOMI   Abdomen: Soft, nontender, nondistended.   Musculoskeletal: No edema  Integument: No rashes        MD Eduardo [facility]  [phone]

## 2024-02-20 ENCOUNTER — PATIENT OUTREACH (OUTPATIENT)
Dept: CASE MANAGEMENT | Age: 69
End: 2024-02-20

## 2024-02-20 DIAGNOSIS — N39.0 COMPLICATED UTI (URINARY TRACT INFECTION): ICD-10-CM

## 2024-02-20 DIAGNOSIS — N30.00 ACUTE CYSTITIS WITHOUT HEMATURIA: ICD-10-CM

## 2024-02-20 DIAGNOSIS — Z02.9 ENCOUNTERS FOR UNSPECIFIED ADMINISTRATIVE PURPOSE: Primary | ICD-10-CM

## 2024-02-20 PROCEDURE — 1111F DSCHRG MED/CURRENT MED MERGE: CPT

## 2024-02-20 NOTE — PROGRESS NOTES
Initial Post Discharge Follow Up   Discharge Date: 2/19/24  Contact Date: 2/20/2024    Consent Verification:  Assessment Completed With: Patient  HIPAA Verified?  Yes    Discharge Dx:   Acute Cystitis without hematuria  Complicated UTI  PMH: frequent UTI's, renal calculi, hypertension and hyperlipidemia     General:   How have you been since your discharge from the hospital? Patient states she went to work today. Patient denies fever/chills, no shortness of breath, no sore throat, no chest pain, no abdominal pian, no nausea/vomiting. Patient reports that her urine is clear yellow, no burning, urgency or foul odor. Patient states she is urinating around every two hours, states she has been drinking a lot of fluids. Patient denies any COVID symptoms, however, she is staying with her daughter because her  has COPD and she does not want to risk being by him. Patient reports the nurse was out to do a teach and train on administering the IV antibiotics. Patient states the nurse is going to do a PICC line dressing change on Thursday. NCM instructed patient to change positions frequently, walk as tolerated, rest when needed, stay hydrated and continue to take up to ten deep breaths an hour while awake, or if watching television take a deep breath with every commercial. NCM reviewed discharge instructions, medications, S&S of infection/blood clots with patient, she verbalized understanding of these. Patient denies any further questions or needs at this time.  Do you have any pain since discharge?  No    When you were leaving the hospital were your discharge instructions reviewed with you? Yes  How well were your discharge instructions explained to you?   On a scale of 1-5   1- Very Poor and 5- Very well   Very Well  Do you have any questions about your discharge instructions?  No  Before leaving the hospital was your diagnoses explained to you? Yes  Do you have any questions about your diagnoses? No  Are you able to  perform normal daily activities of living as you have prior to your hospital stay (dressing, bathing, ambulating to the bathroom, etc)? yes  (NCM) Was patient given a different diet per AVS? no      Medications:   STOP taking:  amoxicillin clavulanate 875-125 MG Tabs (Augmentin)  cloNIDine 0.1 MG Tabs (Catapres)  Irbesartan-hydroCHLOROthiazide 150-12.5 MG Tabs (Avalide)  triamcinolone 0.1 % Crea (Kenalog)  Review your updated medication list below.  Current Outpatient Medications   Medication Sig Dispense Refill    ceFEPIme 1 g in sodium chloride 100 mL Inject 1 g into the vein every 8 (eight) hours for 7 days. ICD10: N10  WEEKLY CBC W/DIFF, CMP  FAX LABS TO DR. COLLINS [phone] 1 each 0    nitrofurantoin monohydrate macro 100 MG Oral Cap Take 1 capsule (100 mg total) by mouth 2 (two) times daily for 10 days. 20 capsule 0    benzonatate 100 MG Oral Cap Take 1 capsule (100 mg total) by mouth 3 (three) times daily as needed. 30 capsule 0    fluticasone propionate 50 MCG/ACT Nasal Suspension SHAKE LIQUID AND USE 2 SPRAYS IN EACH NOSTRIL DAILY 1 each 2    Doxycycline Monohydrate 100 MG Oral Tab Take 100 mg by mouth 2 (two) times daily. 14 tablet 0    cyanocobalamin 1000 MCG/ML Injection Solution INJECT 1 ML INTO THE MUSCLE EVERY 15 DAYS 24 mL 1    Syringe/Needle, Disp, (BD LUER-MARGARITO SYRINGE) 25G X 1\" 3 ML Does not apply Misc USE TWICE A MONTH AS DIRECTED 24 each 2    atorvastatin 20 MG Oral Tab Take 1 tablet (20 mg total) by mouth nightly. 90 tablet 0    furosemide 20 MG Oral Tab Take 1 tablet (20 mg total) by mouth daily as needed. 30 tablet 0    Potassium Chloride ER 10 MEQ Oral Tab CR Take 1 tablet (10 mEq total) by mouth daily as needed. 30 tablet 0    Homeopathic Products (IRRITATED EYE RELIEF OP) Apply to eye.       Were there any changes to your current medication(s) noted on the AVS? Yes  If so, were these medication changes discussed with you prior to leaving the hospital? Yes  If a new medication was  prescribed:    Was the new medication's purpose & side effects reviewed? Yes  Do you have any questions about your new medication? No  Did you  your discharge medications when you left the hospital? Yes  Let's go over your medications together to make sure we are not missing anything. Medications Reviewed  Are there any reasons that keep you from taking your medication as prescribed? No  Are you having any concerns with constipation? No  Did patient receive their flu shot (Sept-March)? No    Discharge medications reviewed/discussed/and reconciled against outpatient medications with patient.  Any changes or updates to medications sent to PCP.  Patient Acknowledged     Referrals/orders at D/C:  Referrals/orders placed at D/C? yes  What services:   Home health   (If HH was ordered) Has HH been set up?  Yes    If Yes: With Whom: [facility]     DME ordered at D/C? No      Discharge orders, AVS reviewed and discussed with patient. Any changes or updates to orders sent to PCP.  Patient Acknowledged      SDOH:   Transportation:    Transportation Needs: No Transportation Needs (2/16/2024)    Transportation Needs     Lack of Transportation: No       Financial Strain:    Financial Resource Strain: Low Risk  (2/20/2024)    Financial Resource Strain     Difficulty of Paying Living Expenses: Not on file     Med Affordability: No        Diagnosis specifics:   Home Isolation  If you have tested positive for COVID-19, you should remain under home isolation and follow the below guidelines:  Stay home for five days.  If you have no symptoms or your symptoms are resolving after five days, you can leave your house.  Continue to wear a mask around others for five additional days.  If you have a fever, continue to stay home until your fever resolves.    Urinary tract infections in women  These self-care steps can help prevent future infections:  Drink plenty of fluids. This helps to prevent dehydration and flush out your bladder. Do  this unless you must restrict fluids for other health reasons, or your healthcare provider told you not to.  Clean yourself correctly after going to the bathroom. Wipe from front to back after using the toilet. This helps prevent the spread of bacteria.  Urinate more often. Don't try to hold urine in for a long time.  Wear loose-fitting clothes and cotton underwear. Don't wear tight-fitting pants.  Improve your diet and prevent constipation. Eat more fresh fruits and vegetables, and fiber. Eat less junk foods and fatty foods.  Don't have sex until your symptoms are gone.  Don't have caffeine, alcohol, and spicy foods. These can irritate your bladder.  Urinate right after you have sex to flush out your bladder.  If you use birth control pills and have frequent bladder infections, discuss it with your healthcare provider.    Call 911 if any of the following occur:   Trouble breathing  Hard to wake up or confusion  Fainting (loss of consciousness)  Fast heart rate    Call your healthcare provider right away if any of these occur:  Fever of 100.4ºF (38.0ºC) or higher, or as directed by your healthcare provider  Symptoms get worse or you have new symptoms  New or worsening back or belly pain  Repeated vomiting, or unable to keep medicine down  Weakness or dizziness      Follow up appointments:      Your appointments       Date & Time Appointment Department (Center)    Feb 23, 2024  2:30 PM CST Exam - Established with Christos Shore MD [facility] ([facility])        Mar 28, 2024  4:00 PM CDT Medicare Annual Well Visit with Christos Shore MD [facility] ([facility])              [facility]  [address] [address] [phone]            TCC  Was TCC ordered: No    PCP (If no TCC appointment)  Does patient already  have a PCP appointment scheduled? Yes  NCM Confirmed PCP office visit, NCM changed visit type to TCM appointment with patient on 2/23/2024 at 2:30 pm       Specialist    Does the patient have any other follow up appointment(s) needing to be scheduled? No  Does the patient need assistance scheduling appointment(s): No    Is there any reason as to why you cannot make your appointment(s)?  No     Needs post D/C:   Now that you are home, are there any needs or concerns you need addressed before your next visit with your PCP?  (DME, meds, questions, etc.): No    Interventions by NCM:   NCM reviewed medications, discharge instructions, S&S of infection/blood clots. Patient instructed to report any new or worsening symptoms, when to call the doctor and when to call 911. Patient verbalized understanding of these. NCM instructed patient to call PCP with any questions or needs, she states she will.      CCM referral placed:    No, patient declined in the past.    BOOK BY DATE: 3/4/2024

## 2024-02-23 ENCOUNTER — OFFICE VISIT (OUTPATIENT)
Dept: INTERNAL MEDICINE CLINIC | Facility: CLINIC | Age: 69
End: 2024-02-23
Payer: MEDICARE

## 2024-02-23 VITALS
WEIGHT: 254.81 LBS | DIASTOLIC BLOOD PRESSURE: 76 MMHG | HEIGHT: 61 IN | BODY MASS INDEX: 48.11 KG/M2 | SYSTOLIC BLOOD PRESSURE: 134 MMHG | HEART RATE: 80 BPM | OXYGEN SATURATION: 96 % | TEMPERATURE: 98 F

## 2024-02-23 DIAGNOSIS — I70.0 ATHEROSCLEROSIS OF ABDOMINAL AORTA (HCC): ICD-10-CM

## 2024-02-23 DIAGNOSIS — E66.01 CLASS 3 SEVERE OBESITY DUE TO EXCESS CALORIES WITH SERIOUS COMORBIDITY AND BODY MASS INDEX (BMI) OF 40.0 TO 44.9 IN ADULT (HCC): ICD-10-CM

## 2024-02-23 DIAGNOSIS — E03.8 SUBCLINICAL HYPOTHYROIDISM: ICD-10-CM

## 2024-02-23 DIAGNOSIS — I10 PRIMARY HYPERTENSION: ICD-10-CM

## 2024-02-23 DIAGNOSIS — B96.5 PSEUDOMONAS URINARY TRACT INFECTION: Primary | ICD-10-CM

## 2024-02-23 DIAGNOSIS — E78.2 MIXED HYPERLIPIDEMIA: ICD-10-CM

## 2024-02-23 DIAGNOSIS — U07.1 COVID-19 VIRUS INFECTION: ICD-10-CM

## 2024-02-23 DIAGNOSIS — R73.03 PREDIABETES: ICD-10-CM

## 2024-02-23 DIAGNOSIS — N39.0 PSEUDOMONAS URINARY TRACT INFECTION: Primary | ICD-10-CM

## 2024-02-25 NOTE — PROGRESS NOTES
Subjective:   Qing Miranda is a 69 year old female who presents for hospital follow up.   She was discharged from [facility] to Home Health Care Services  Admission Date: 2/15/24   Discharge Date: 2/19/24  Hospital Discharge Diagnosis: pseudomonas UTI, covid infection, hypertension, hyperlipidemia, preidabetes, preDM, obesity     Interactive contact within 2 business days post discharge first initiated on Date: 2/20/2024    During the visit, the following was completed:  Obtained and reviewed discharge summary, continuity of care documents, and Hospitalist notes  Reviewed Labs (CBC, CMP), Labs (Microbiology), and CT radiology results    HPI: pt admitted for pseudomonas UTI, was seen initially in ER for uti, was discharged on macrobid however urine culture came back positive for pseudomonas, pt  unable to take quinolones due to allergies so needed to be admitted for IV abx  cefepime then,  pt was seen by ID specialist, ct abd done and negative.   Pt  had midline for continuation of IV cefipime for pt to complete 10 days total abx tx.     History/Other:   Current Medications:  Medication Reconciliation:  I am aware of an inpatient discharge within the last 30 days.  The discharge medication list has been reconciled with the patient's current medication list and reviewed by me.  See medication list for additions of new medication, and changes to current doses of medications and discontinued medications.  Outpatient Medications Marked as Taking for the 2/23/24 encounter (Office Visit) with Christos Shore MD   Medication Sig    ceFEPIme 1 g in sodium chloride 100 mL Inject 1 g into the vein every 8 (eight) hours for 7 days. ICD10: N10  WEEKLY CBC W/DIFF, CMP  FAX LABS TO DR. COLLINS [phone]    fluticasone propionate 50 MCG/ACT Nasal Suspension SHAKE LIQUID AND USE 2 SPRAYS IN EACH NOSTRIL DAILY    cyanocobalamin 1000 MCG/ML Injection Solution INJECT 1 ML INTO THE MUSCLE EVERY 15 DAYS    atorvastatin  20 MG Oral Tab Take 1 tablet (20 mg total) by mouth nightly.    furosemide 20 MG Oral Tab Take 1 tablet (20 mg total) by mouth daily as needed.    Potassium Chloride ER 10 MEQ Oral Tab CR Take 1 tablet (10 mEq total) by mouth daily as needed.    Homeopathic Products (IRRITATED EYE RELIEF OP) Apply to eye.       Review of Systems:  GENERAL: weight stable, energy stable, no sweating  SKIN: denies any unusual skin lesions  EYES: denies blurred vision or double vision  HEENT: denies nasal congestion, sinus pain or ST  LUNGS: denies shortness of breath with exertion  CARDIOVASCULAR: denies chest pain on exertion or palpitations  GI: denies abdominal pain, denies heartburn, denies diarrhea  MUSCULOSKELETAL: denies pain, normal range of motion of extremities  NEURO: denies headaches, denies dizziness, denies weakness  PSYCHE: denies depression or anxiety  HEMATOLOGIC: denies hx of anemia, or bruising, denies bleeding  ENDOCRINE:  thyroid history  ALL/ASTHMA: denies hx of allergy or asthma    Objective:   No LMP recorded. Patient is postmenopausal.  Estimated body mass index is 48.14 kg/m² as calculated from the following:    Height as of this encounter: 5' 1\" (1.549 m).    Weight as of this encounter: 254 lb 12.8 oz (115.6 kg).   /76   Pulse 80   Temp 98 °F (36.7 °C) (Tympanic)   Ht 5' 1\" (1.549 m)   Wt 254 lb 12.8 oz (115.6 kg)   SpO2 96%   BMI 48.14 kg/m²    GENERAL: well developed, well nourished, in no apparent distress  SKIN: no rashes, no suspicious lesions  HEENT: atraumatic, normocephalic, ears and throat are clear  EYES: PERRLA, EOMI, conjunctiva are clear  NECK: supple, no adenopathy, no bruits  CHEST: no chest tenderness  LUNGS: clear to auscultation  CARDIO: RRR without murmur  GI: good BS's, no masses, HSM or tenderness  MUSCULOSKELETAL: back is not tender, FROM of the extremities  EXTREMITIES: no cyanosis, clubbing or edema  NEURO: Oriented times three, cranial nerves are intact, motor and sensory  are grossly intact    Assessment & Plan:   (N39.0,  B96.5) Pseudomonas urinary tract infection  (primary encounter diagnosis)  Plan: pt now asymptomatic, will complete 10 days of IV cefepime as recommended by ID service.     (U07.1) COVID-19 virus infection  Plan: pt already has no more symptoms and was beoyond     (I10) Primary hypertension  Plan: bp controlled. Cpm.     (R73.03) Prediabetes  Plan: continue to watch diet, cut back on carbs. Work on losing weight .    (E78.2) Mixed hyperlipidemia  Plan: on statin.     (E03.8) Subclinical hypothyroidism  Plan: we had just been observing and no treatment til TSH gets to above 10.     (E66.01,  Z68.41) Class 3 severe obesity due to excess calories with serious comorbidity and body mass index (BMI) of 40.0 to 44.9 in adult (HCC)  Plan: pt already had seen weight clinic before.     (I70.0) Atherosclerosis of abdominal aorta (HCC)  Plan: pt already on statin.          No follow-ups on file.

## 2024-03-28 ENCOUNTER — OFFICE VISIT (OUTPATIENT)
Dept: INTERNAL MEDICINE CLINIC | Facility: CLINIC | Age: 69
End: 2024-03-28
Payer: MEDICARE

## 2024-03-28 ENCOUNTER — LAB ENCOUNTER (OUTPATIENT)
Dept: LAB | Age: 69
End: 2024-03-28
Attending: INTERNAL MEDICINE
Payer: MEDICARE

## 2024-03-28 VITALS
OXYGEN SATURATION: 96 % | HEART RATE: 74 BPM | SYSTOLIC BLOOD PRESSURE: 132 MMHG | BODY MASS INDEX: 47.67 KG/M2 | HEIGHT: 61 IN | TEMPERATURE: 99 F | DIASTOLIC BLOOD PRESSURE: 80 MMHG | WEIGHT: 252.5 LBS

## 2024-03-28 DIAGNOSIS — E66.01 MORBID OBESITY WITH BMI OF 45.0-49.9, ADULT (HCC): ICD-10-CM

## 2024-03-28 DIAGNOSIS — R35.0 URINARY FREQUENCY: ICD-10-CM

## 2024-03-28 DIAGNOSIS — M85.88 OSTEOPENIA OF LUMBAR SPINE: ICD-10-CM

## 2024-03-28 DIAGNOSIS — Z12.31 ENCOUNTER FOR SCREENING MAMMOGRAM FOR BREAST CANCER: ICD-10-CM

## 2024-03-28 DIAGNOSIS — E55.9 VITAMIN D DEFICIENCY: ICD-10-CM

## 2024-03-28 DIAGNOSIS — E78.2 MIXED HYPERLIPIDEMIA: ICD-10-CM

## 2024-03-28 DIAGNOSIS — Z00.00 MEDICARE ANNUAL WELLNESS VISIT, SUBSEQUENT: Primary | ICD-10-CM

## 2024-03-28 DIAGNOSIS — I70.0 ATHEROSCLEROSIS OF ABDOMINAL AORTA (HCC): ICD-10-CM

## 2024-03-28 DIAGNOSIS — E53.8 VITAMIN B12 DEFICIENCY: ICD-10-CM

## 2024-03-28 DIAGNOSIS — Z12.11 COLON CANCER SCREENING: ICD-10-CM

## 2024-03-28 DIAGNOSIS — I34.0 NONRHEUMATIC MITRAL VALVE REGURGITATION: ICD-10-CM

## 2024-03-28 DIAGNOSIS — R06.83 SNORING: ICD-10-CM

## 2024-03-28 DIAGNOSIS — R73.03 PREDIABETES: ICD-10-CM

## 2024-03-28 DIAGNOSIS — E03.8 SUBCLINICAL HYPOTHYROIDISM: ICD-10-CM

## 2024-03-28 DIAGNOSIS — I10 PRIMARY HYPERTENSION: ICD-10-CM

## 2024-03-28 LAB
BILIRUB UR QL: NEGATIVE
CLARITY UR: CLEAR
GLUCOSE UR-MCNC: NORMAL MG/DL
HGB UR QL STRIP.AUTO: NEGATIVE
KETONES UR-MCNC: NEGATIVE MG/DL
LEUKOCYTE ESTERASE UR QL STRIP.AUTO: 75
NITRITE UR QL STRIP.AUTO: NEGATIVE
PH UR: 5 [PH] (ref 5–8)
PROT UR-MCNC: NEGATIVE MG/DL
SP GR UR STRIP: 1.02 (ref 1–1.03)
UROBILINOGEN UR STRIP-ACNC: NORMAL

## 2024-03-28 PROCEDURE — 81001 URINALYSIS AUTO W/SCOPE: CPT

## 2024-03-28 PROCEDURE — 87086 URINE CULTURE/COLONY COUNT: CPT

## 2024-03-28 NOTE — PROGRESS NOTES
Subjective:   Qing Miranda is a 69 year old female who presents for a Medicare Subsequent Annual Wellness visit (Pt already had Initial Annual Wellness) and scheduled follow up of multiple significant but stable problems.       History/Other:   Fall Risk Assessment:   She has been screened for Falls and is low risk.      Cognitive Assessment:   She had a completely normal cognitive assessment - see flowsheet entries     Functional Ability/Status:   Qing Miranda has some abnormal functions as listed below:  She has Vision problems based on screening of functional status.       Depression Screening (PHQ-2/PHQ-9): PHQ-2 SCORE: 0  , done 3/28/2024   If you checked off any problems, how difficult have these problems made it for you to do your work, take care of things at home, or get along with other people?: Not difficult at all    Last [place] Suicide Screening on 3/28/2024 was No Risk.         Advanced Directives:   She does NOT have a Living Will. [Do you have a living will?: No]  She does NOT have a Power of  for Health Care. [Do you have a healthcare power of ?: No]  Discussed Advance Care Planning with patient (and family/surrogate if present). Standard forms made available to patient in After Visit Summary.      Patient Active Problem List   Diagnosis    Colon cancer screening    Mixed hyperlipidemia    Vitamin B12 deficiency    Subclinical hypothyroidism    Prediabetes    Vitamin D deficiency    Primary hypertension    Stress    Depression, recurrent (HCC)    Osteopenia of lumbar spine    Atherosclerosis of abdominal aorta (HCC)     Allergies:  She is allergic to ciprofloxacin.    Current Medications:  Outpatient Medications Marked as Taking for the 3/28/24 encounter (Office Visit) with Christos Shore MD   Medication Sig    fluticasone propionate 50 MCG/ACT Nasal Suspension SHAKE LIQUID AND USE 2 SPRAYS IN EACH NOSTRIL DAILY    cyanocobalamin 1000 MCG/ML Injection Solution  INJECT 1 ML INTO THE MUSCLE EVERY 15 DAYS    Syringe/Needle, Disp, (BD LUER-MARGARITO SYRINGE) 25G X 1\" 3 ML Does not apply Misc USE TWICE A MONTH AS DIRECTED    atorvastatin 20 MG Oral Tab Take 1 tablet (20 mg total) by mouth nightly.    furosemide 20 MG Oral Tab Take 1 tablet (20 mg total) by mouth daily as needed.    Potassium Chloride ER 10 MEQ Oral Tab CR Take 1 tablet (10 mEq total) by mouth daily as needed.    Homeopathic Products (IRRITATED EYE RELIEF OP) Apply to eye.       Medical History:  She  has a past medical history of Diverticulosis, Frequent UTI, Hemorrhoids, High cholesterol, HTN (hypertension), Hyperlipidemia, Hypothyroidism, Macular degeneration, Morbid obesity with BMI of 50.0-59.9, adult (HCC), Prediabetes, Pregnancy (HCC), Supraventricular tachycardia (), and Vitamin B12 deficiency.  Surgical History:  She  has a past surgical history that includes  (); cholecystectomy (); hemorrhoidectomy; other surgical history; ablation; and colonoscopy (N/A, 2017).   Family History:  Her family history includes Cancer in her father; Diabetes in her mother and sister; Heart Disorder in her father and mother; Melanoma in her father; Obesity in her sister; Other in her brother, father, mother, and other family members; Ovarian Cancer in her maternal grandmother; Renal Disease in her mother; Stroke in her sister.  Social History:  She  reports that she quit smoking about 28 years ago. Her smoking use included cigarettes. She has a 30 pack-year smoking history. She has been exposed to tobacco smoke. She has never used smokeless tobacco. She reports current alcohol use. She reports that she does not use drugs.    Tobacco:  She smoked tobacco in the past but quit greater than 12 months ago.  Social History    Tobacco Use      Smoking status: Former        Packs/day: 1.00        Years: 30.00        Additional pack years: 0.00        Total pack years: 30.00        Types: Cigarettes        Quit  date: 1996        Years since quittin.2        Passive exposure: Past      Smokeless tobacco: Never      Tobacco comments: Quit at 42 y/o       CAGE Alcohol Screen:   CAGE screening score of 0 on 3/28/2024, showing low risk of alcohol abuse.      Patient Care Team:  Christos Shore MD as PCP - General (Internal Medicine)  Alice Monsalve MD as Consulting Physician (NEUROLOGY)  Oneal Mckenna MD as Consulting Physician (BARIATRICS)  Paige Mcgill MD (OBSTETRICS & GYNECOLOGY)    Review of Systems  GENERAL: feels well otherwise  SKIN: denies any unusual skin lesions  EYES: denies blurred vision or double vision  HEENT: denies nasal congestion, sinus pain or ST  LUNGS: denies shortness of breath with exertion  CARDIOVASCULAR: denies chest pain on exertion  GI: denies abdominal pain, denies heartburn  : denies dysuria, vaginal discharge or itching, no complaint of urinary incontinence ; +frequency  MUSCULOSKELETAL: denies back pain  NEURO: denies headaches  PSYCHE: denies depression or anxiety  HEMATOLOGIC: denies hx of anemia  ENDOCRINE:  thyroid history  ALL/ASTHMA: denies hx of allergy or asthma    Objective:   Physical Exam  General Appearance:  Alert, cooperative, no distress, appears stated age   Head:  Normocephalic, without obvious abnormality, atraumatic   Eyes:  PERRL, conjunctiva/corneas clear, EOM's intact both eyes   Ears:  Normal TM's and external ear canals, both ears   Nose: Nares normal, septum midline,mucosa normal, no drainage or sinus tenderness   Throat: Lips, mucosa, and tongue normal; teeth and gums normal   Neck: Supple, symmetrical, trachea midline, no adenopathy;  thyroid: not enlarged, symmetric, no tenderness/mass/nodules; no carotid bruit or JVD   Back:   Symmetric, no curvature, ROM normal, no CVA tenderness   Lungs:   Clear to auscultation bilaterally, respirations unlabored   Heart:  Regular rate and rhythm, S1 and S2 normal, + murmur, rub, or gallop   Abdomen:   Soft,  non-tender, bowel sounds active all four quadrants,  no masses, no organomegaly   Pelvic: Deferred   Extremities: Extremities normal, atraumatic, no cyanosis or edema   Pulses: 2+ and symmetric   Skin: Skin color, texture, turgor normal, no rashes or lesions   Lymph nodes: Cervical, supraclavicular nodes normal   Neurologic: Normal       /80   Pulse 74   Temp 98.8 °F (37.1 °C) (Tympanic)   Ht 5' 1\" (1.549 m)   Wt 252 lb 8 oz (114.5 kg)   SpO2 96%   BMI 47.71 kg/m²  Estimated body mass index is 47.71 kg/m² as calculated from the following:    Height as of this encounter: 5' 1\" (1.549 m).    Weight as of this encounter: 252 lb 8 oz (114.5 kg).    Medicare Hearing Assessment:   Hearing Screening    Time taken: 3/28/2024  3:12 PM  Screening Method: Questionnaire  I have a problem hearing over the telephone: No I have trouble following the conversations when two or more people are talking at the same time: No   I have trouble understanding things on the TV: No I have to strain to understand conversations: No   I have to worry about missing the telephone ring or doorbell: No I have trouble hearing conversations in a noisy background such as a crowded room or restaurant: Yes   I get confused about where sounds come from: No I misunderstand some words in a sentence and need to ask people to repeat themselves: No   I especially have trouble understanding the speech of women and children: No I have trouble understanding the speaker in a large room such as at a meeting or place of Yazdanism: Yes   Many people I talk to seem to mumble (or don't speak clearly): No People get annoyed because I misunderstand what they say: No   I misunderstand what others are saying and make inappropriate responses: No I avoid social activities because I cannot hear well and fear I will reply improperly: No   Family members and friends have told me they think I may have hearing loss: No             Visual Acuity:   Right Eye Visual  Acuity: Uncorrected Right Eye Chart Acuity: 20/40   Left Eye Visual Acuity: Uncorrected Left Eye Chart Acuity: 20/25   Both Eyes Visual Acuity: Uncorrected Both Eyes Chart Acuity: 20/25   Able To Tolerate Visual Acuity: Yes        Assessment & Plan:   Qing Miranda is a 69 year old female who presents for a Medicare Assessment.     (Z00.00) Medicare annual wellness visit, subsequent  (primary encounter diagnosis)  Plan: CBC With Differential With Platelet        Routne labs ordered. Advised to get shingrix and rsv vaccine.     (Z12.31) Encounter for screening mammogram for breast cancer  Plan: [facility] BRYAN 2D+3D SCREENING BILAT         (CPT=77067/36199)        Mammogram ordered.     (R35.0) Urinary frequency  Plan: Urinalysis, Routine [E], Urine Culture, Routine        [E]        Check ua and culture.     (R06.83) Snoring  Plan: Home Sleep Apnea Test (Adult pt only) - Sleep         consult required for Medicare pts, General         sleep study        Will order home sleep test .    (I34.0) Nonrheumatic mitral valve regurgitation  Plan: CARD ECHO 2D DOPPLER (CPT=93306)       Had been mild before and asymptomatic; will check 2decho    (E03.8) Subclinical hypothyroidism  Plan: TSH W Reflex To Free T4        Check TFT.     (I10) Primary hypertension  Plan: bp controlled. Cpm.     (E78.2) Mixed hyperlipidemia  Plan: Comp Metabolic Panel (14), Lipid Panel        Check lipid panel; continue with low chol diet and chol med.     (E55.9) Vitamin D deficiency  Plan: Vitamin D        Check vit D.     (E53.8) Vitamin B12 deficiency  Plan: Vitamin B12        Check vit B12.     (R73.03) Prediabetes  Plan: Hemoglobin A1C        Check fbg and A1c; cut down on carbs; work on losing weight .    (I70.0) Atherosclerosis of abdominal aorta (HCC)  Plan: pt already on statin.     (M85.88) Osteopenia of lumbar spine  Plan: continue with vit D and calcium supplement .     (Z12.11) Colon cancer screening  Plan: pt current with her  colonoscopy done in 2017 normal per pt and was told then to repeat in  10 yrs .    (E66.01,  Z68.42) Morbid obesity with BMI of 45.0-49.9, adult (HCC)  Plan: pt had been seen in bariatric clinic before and had d/w pt regarding going back and ffup with them. She states she is working on losing weight on her own currently.      The patient indicates understanding of these issues and agrees to the plan.  Reinforced healthy diet, lifestyle, and exercise.      No follow-ups on file.     Christos Shore MD, 3/28/2024     Supplementary Documentation:   General Health:  In the past six months, have you lost more than 10 pounds without trying?: 2 - No  Has your appetite been poor?: No  Type of Diet: Balanced  How does the patient maintain a good energy level?: Daily Walks  How would you describe your daily physical activity?: Heavy  How would you describe your current health state?: Good  How do you maintain positive mental well-being?: Social Interaction  On a scale of 0 to 10, with 0 being no pain and 10 being severe pain, what is your pain level?: 1 - (Mild)  In the past six months, have you experienced urine leakage?: 1-Yes  At any time do you feel concerned for the safety/well-being of yourself and/or your children, in your home or elsewhere?: No  Have you had any immunizations at another office such as Influenza, Hepatitis B, Tetanus, or Pneumococcal?: No       Qing Miranda's SCREENING SCHEDULE   Tests on this list are recommended by your physician but may not be covered, or covered at this frequency, by your insurer.   Please check with your insurance carrier before scheduling to verify coverage.   PREVENTATIVE SERVICES FREQUENCY &  COVERAGE DETAILS LAST COMPLETION DATE   Diabetes Screening    Fasting Blood Sugar /  Glucose    One screening every 12 months if never tested or if previously tested but not diagnosed with pre-diabetes   One screening every 6 months if diagnosed with pre-diabetes Lab Results    Component Value Date     (H) 02/16/2024        Cardiovascular Disease Screening    Lipid Panel  Cholesterol  Lipoprotein (HDL)  Triglycerides Covered every 5 years for all Medicare beneficiaries without apparent signs or symptoms of cardiovascular disease Lab Results   Component Value Date    CHOLEST 184 03/10/2023    HDL 37 (L) 03/10/2023     (H) 03/10/2023    TRIG 223 (H) 03/10/2023         Electrocardiogram (EKG)   Covered if needed at Welcome to Medicare, and non-screening if indicated for medical reasons 10/07/2021      Ultrasound Screening for Abdominal Aortic Aneurysm (AAA) Covered once in a lifetime for one of the following risk factors    Men who are 65-75 years old and have ever smoked    Anyone with a family history -     Colorectal Cancer Screening  Covered for ages 50-85; only need ONE of the following:    Colonoscopy   Covered every 10 years    Covered every 2 years if patient is at high risk or previous colonoscopy was abnormal 11/13/2017    Health Maintenance   Topic Date Due    Colorectal Cancer Screening  11/13/2027       Flexible Sigmoidoscopy   Covered every 4 years -    Fecal Occult Blood Test Covered annually -   Bone Density Screening    Bone density screening    Covered every 2 years after age 65 if diagnosed with risk of osteoporosis or estrogen deficiency.    Covered yearly for long-term glucocorticoid medication use (Steroids) Last Dexa Scan:    XR DEXA BONE DENSITOMETRY (CPT=77080) 08/04/2021      No recommendations at this time   Pap and Pelvic    Pap   Covered every 2 years for women at normal risk; Annually if at high risk 12/15/2021  No recommendations at this time    Chlamydia Annually if high risk -  No recommendations at this time   Screening Mammogram    Mammogram     Recommend annually for all female patients aged 40 and older    One baseline mammogram covered for patients aged 35-39 06/01/2023    Health Maintenance   Topic Date Due    Mammogram  06/01/2024        Immunizations    Influenza Covered once per flu season  Please get every year -  No recommendations at this time    Pneumococcal Each vaccine ([id number] & [id number]) covered once after 65 Prevnar 13: -    [id number]: 09/22/2021     No recommendations at this time    Hepatitis B One screening covered for patients with certain risk factors   -  No recommendations at this time    Tetanus Toxoid Not covered by Medicare Part B unless medically necessary (cut with metal); may be covered with your pharmacy prescription benefits -    Tetanus, Diptheria and Pertusis TD and TDaP Not covered by Medicare Part B -  No recommendations at this time    Zoster Not covered by Medicare Part B; may be covered with your pharmacy  prescription benefits -  Zoster Vaccines(1 of 2) Never done     Annual Monitoring of Persistent Medications (ACE/ARB, digoxin diuretics, anticonvulsants)    Potassium Annually Lab Results   Component Value Date    K 4.3 02/16/2024         Creatinine   Annually Lab Results   Component Value Date    CREATSERUM 0.75 02/16/2024         BUN Annually Lab Results   Component Value Date    BUN 23 02/16/2024       Drug Serum Conc Annually No results found for: \"DIGOXIN\", \"DIG\", \"VALP\"

## 2024-03-29 ENCOUNTER — TELEPHONE (OUTPATIENT)
Dept: INTERNAL MEDICINE CLINIC | Facility: CLINIC | Age: 69
End: 2024-03-29

## 2024-03-29 NOTE — TELEPHONE ENCOUNTER
Patient called, verified Name and . States she was told to call the office to follow up on urine testing. Urinalysis done yesterday.    Dr. Shore please advise.

## 2024-03-29 NOTE — TELEPHONE ENCOUNTER
pt was called and inform of your message below. Pt was inform our office is close tomorrow. She will check her [employer] account  FYI  our office is close tomorrow.

## 2024-03-29 NOTE — TELEPHONE ENCOUNTER
Urine culture preliminary result negative so far. Have pt call back again tomrrow am for final result.

## 2024-03-31 PROBLEM — J98.6 ELEVATED HEMIDIAPHRAGM: Status: RESOLVED | Noted: 2023-02-19 | Resolved: 2024-03-31

## 2024-03-31 PROBLEM — E66.813 CLASS 3 SEVERE OBESITY DUE TO EXCESS CALORIES WITH SERIOUS COMORBIDITY AND BODY MASS INDEX (BMI) OF 40.0 TO 44.9 IN ADULT (HCC): Status: RESOLVED | Noted: 2017-09-28 | Resolved: 2024-03-31
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
## 2024-03-31 PROBLEM — N30.00 ACUTE CYSTITIS WITHOUT HEMATURIA: Status: RESOLVED | Noted: 2024-02-15 | Resolved: 2024-03-31

## 2024-03-31 PROBLEM — E66.813 CLASS 3 SEVERE OBESITY DUE TO EXCESS CALORIES WITH SERIOUS COMORBIDITY AND BODY MASS INDEX (BMI) OF 40.0 TO 44.9 IN ADULT: Status: RESOLVED | Noted: 2017-09-28 | Resolved: 2024-03-31

## 2024-03-31 PROBLEM — E66.01 CLASS 3 SEVERE OBESITY DUE TO EXCESS CALORIES WITH SERIOUS COMORBIDITY AND BODY MASS INDEX (BMI) OF 40.0 TO 44.9 IN ADULT (HCC): Status: RESOLVED | Noted: 2017-09-28 | Resolved: 2024-03-31

## 2024-05-21 ENCOUNTER — MED REC SCAN ONLY (OUTPATIENT)
Dept: INTERNAL MEDICINE CLINIC | Facility: CLINIC | Age: 69
End: 2024-05-21

## 2024-07-27 ENCOUNTER — HOSPITAL ENCOUNTER (EMERGENCY)
Facility: HOSPITAL | Age: 69
Discharge: HOME | End: 2024-07-27
Attending: EMERGENCY MEDICINE
Payer: MEDICARE

## 2024-07-27 ENCOUNTER — APPOINTMENT (OUTPATIENT)
Dept: CT IMAGING | Facility: HOSPITAL | Age: 69
End: 2024-07-27
Attending: EMERGENCY MEDICINE
Payer: MEDICARE

## 2024-07-27 ENCOUNTER — APPOINTMENT (OUTPATIENT)
Dept: GENERAL RADIOLOGY | Facility: HOSPITAL | Age: 69
End: 2024-07-27
Attending: EMERGENCY MEDICINE
Payer: MEDICARE

## 2024-07-27 VITALS
BODY MASS INDEX: 47.2 KG/M2 | TEMPERATURE: 98 F | DIASTOLIC BLOOD PRESSURE: 70 MMHG | OXYGEN SATURATION: 94 % | SYSTOLIC BLOOD PRESSURE: 156 MMHG | RESPIRATION RATE: 17 BRPM | WEIGHT: 250 LBS | HEIGHT: 61 IN | HEART RATE: 62 BPM

## 2024-07-27 DIAGNOSIS — R06.00 DYSPNEA, UNSPECIFIED TYPE: Primary | ICD-10-CM

## 2024-07-27 DIAGNOSIS — R82.90 ABNORMAL URINALYSIS: ICD-10-CM

## 2024-07-27 DIAGNOSIS — J69.0 ASPIRATION PNEUMONITIS (HCC): ICD-10-CM

## 2024-07-27 DIAGNOSIS — R53.83 OTHER FATIGUE: ICD-10-CM

## 2024-07-27 LAB
ALBUMIN SERPL-MCNC: 4.6 G/DL (ref 3.2–4.8)
ALBUMIN/GLOB SERPL: 1.7 {RATIO} (ref 1–2)
ALP LIVER SERPL-CCNC: 113 U/L
ALT SERPL-CCNC: 11 U/L
ANION GAP SERPL CALC-SCNC: 5 MMOL/L (ref 0–18)
AST SERPL-CCNC: 12 U/L (ref ?–34)
BASOPHILS # BLD AUTO: 0.04 X10(3) UL (ref 0–0.2)
BASOPHILS NFR BLD AUTO: 0.5 %
BILIRUB SERPL-MCNC: 0.4 MG/DL (ref 0.2–1.1)
BILIRUB UR QL: NEGATIVE
BUN BLD-MCNC: 16 MG/DL (ref 9–23)
BUN/CREAT SERPL: 19.3 (ref 10–20)
CALCIUM BLD-MCNC: 9.5 MG/DL (ref 8.7–10.4)
CHLORIDE SERPL-SCNC: 106 MMOL/L (ref 98–112)
CLARITY UR: CLEAR
CO2 SERPL-SCNC: 29 MMOL/L (ref 21–32)
COHGB MFR BLD: 3 % (ref 0–3)
COLOR UR: COLORLESS
CREAT BLD-MCNC: 0.83 MG/DL
D DIMER PPP FEU-MCNC: 2.72 UG/ML FEU (ref ?–0.68)
DEPRECATED RDW RBC AUTO: 45.6 FL (ref 35.1–46.3)
EGFRCR SERPLBLD CKD-EPI 2021: 77 ML/MIN/1.73M2 (ref 60–?)
EOSINOPHIL # BLD AUTO: 0.26 X10(3) UL (ref 0–0.7)
EOSINOPHIL NFR BLD AUTO: 3 %
ERYTHROCYTE [DISTWIDTH] IN BLOOD BY AUTOMATED COUNT: 14.1 % (ref 11–15)
GLOBULIN PLAS-MCNC: 2.7 G/DL (ref 2–3.5)
GLUCOSE BLD-MCNC: 86 MG/DL (ref 70–99)
GLUCOSE UR-MCNC: NORMAL MG/DL
HCT VFR BLD AUTO: 42 %
HGB BLD-MCNC: 13.3 G/DL
HGB UR QL STRIP.AUTO: NEGATIVE
IMM GRANULOCYTES # BLD AUTO: 0.02 X10(3) UL (ref 0–1)
IMM GRANULOCYTES NFR BLD: 0.2 %
KETONES UR-MCNC: NEGATIVE MG/DL
LEUKOCYTE ESTERASE UR QL STRIP.AUTO: 250
LYMPHOCYTES # BLD AUTO: 2.92 X10(3) UL (ref 1–4)
LYMPHOCYTES NFR BLD AUTO: 34 %
MCH RBC QN AUTO: 27.8 PG (ref 26–34)
MCHC RBC AUTO-ENTMCNC: 31.7 G/DL (ref 31–37)
MCV RBC AUTO: 87.9 FL
MONOCYTES # BLD AUTO: 0.59 X10(3) UL (ref 0.1–1)
MONOCYTES NFR BLD AUTO: 6.9 %
NEUTROPHILS # BLD AUTO: 4.75 X10 (3) UL (ref 1.5–7.7)
NEUTROPHILS # BLD AUTO: 4.75 X10(3) UL (ref 1.5–7.7)
NEUTROPHILS NFR BLD AUTO: 55.4 %
NITRITE UR QL STRIP.AUTO: NEGATIVE
OSMOLALITY SERPL CALC.SUM OF ELEC: 290 MOSM/KG (ref 275–295)
PH UR: 5.5 [PH] (ref 5–8)
PLATELET # BLD AUTO: 346 10(3)UL (ref 150–450)
POTASSIUM SERPL-SCNC: 3.7 MMOL/L (ref 3.5–5.1)
PROT SERPL-MCNC: 7.3 G/DL (ref 5.7–8.2)
PROT UR-MCNC: NEGATIVE MG/DL
PUNCTURE CHARGE: NO
RBC # BLD AUTO: 4.78 X10(6)UL
SODIUM SERPL-SCNC: 140 MMOL/L (ref 136–145)
SP GR UR STRIP: 1.01 (ref 1–1.03)
T4 FREE SERPL-MCNC: 1.1 NG/DL (ref 0.8–1.7)
TROPONIN I SERPL HS-MCNC: 4 NG/L
TSI SER-ACNC: 5.88 MIU/ML (ref 0.55–4.78)
UROBILINOGEN UR STRIP-ACNC: NORMAL
WBC # BLD AUTO: 8.6 X10(3) UL (ref 4–11)

## 2024-07-27 PROCEDURE — 93005 ELECTROCARDIOGRAM TRACING: CPT

## 2024-07-27 PROCEDURE — 71045 X-RAY EXAM CHEST 1 VIEW: CPT | Performed by: EMERGENCY MEDICINE

## 2024-07-27 PROCEDURE — 85025 COMPLETE CBC W/AUTO DIFF WBC: CPT | Performed by: EMERGENCY MEDICINE

## 2024-07-27 PROCEDURE — 84443 ASSAY THYROID STIM HORMONE: CPT | Performed by: EMERGENCY MEDICINE

## 2024-07-27 PROCEDURE — 81001 URINALYSIS AUTO W/SCOPE: CPT | Performed by: EMERGENCY MEDICINE

## 2024-07-27 PROCEDURE — 84439 ASSAY OF FREE THYROXINE: CPT | Performed by: EMERGENCY MEDICINE

## 2024-07-27 PROCEDURE — 82805 BLOOD GASES W/O2 SATURATION: CPT | Performed by: EMERGENCY MEDICINE

## 2024-07-27 PROCEDURE — 85379 FIBRIN DEGRADATION QUANT: CPT | Performed by: EMERGENCY MEDICINE

## 2024-07-27 PROCEDURE — 80053 COMPREHEN METABOLIC PANEL: CPT | Performed by: EMERGENCY MEDICINE

## 2024-07-27 PROCEDURE — 84484 ASSAY OF TROPONIN QUANT: CPT | Performed by: EMERGENCY MEDICINE

## 2024-07-27 PROCEDURE — 93010 ELECTROCARDIOGRAM REPORT: CPT

## 2024-07-27 PROCEDURE — 71260 CT THORAX DX C+: CPT | Performed by: EMERGENCY MEDICINE

## 2024-07-27 PROCEDURE — 99285 EMERGENCY DEPT VISIT HI MDM: CPT

## 2024-07-27 PROCEDURE — 96365 THER/PROPH/DIAG IV INF INIT: CPT

## 2024-07-27 PROCEDURE — 82375 ASSAY CARBOXYHB QUANT: CPT | Performed by: EMERGENCY MEDICINE

## 2024-07-27 RX ORDER — ALBUTEROL SULFATE 90 UG/1
2 AEROSOL, METERED RESPIRATORY (INHALATION) EVERY 4 HOURS PRN
Qty: 1 EACH | Refills: 0 | Status: CANCELLED | OUTPATIENT
Start: 2024-07-27 | End: 2024-08-26

## 2024-07-27 RX ORDER — AMOXICILLIN AND CLAVULANATE POTASSIUM 875; 125 MG/1; MG/1
1 TABLET, FILM COATED ORAL 2 TIMES DAILY
Qty: 14 TABLET | Refills: 0 | Status: SHIPPED | OUTPATIENT
Start: 2024-07-27 | End: 2024-08-03

## 2024-07-27 NOTE — ED PROVIDER NOTES
Patient Seen in: [facility] Emergency Department    History     Chief Complaint   Patient presents with    Fatigue    Nausea     Stated Complaint: headache, nasuea, RADHA     HPI    69-year-old female with past medical history of hypertension, dyslipidemia, hypothyroid, recurrent UTI presenting for evaluation with several weeks to nearly 1 month of exertional shortness of breath associated nausea/fatigue with concern for seeming daytime somnolence and drowsiness.  Patient with longstanding history of snoring dating back to adolescence for which home sleep study scheduled though delayed secondary to familial circumstances.  No fevers or chills, no cough.  No leg swelling.  No recent travel or surgeries, no hemoptysis.  No chest pain.  No new medications or dosage changes.  Nausea without associated abdominal pain or diarrhea/urinary complaints though with some symptoms similarity to prior UTI state.      Past Medical History:    Diverticulosis    Frequent UTI    Hemorrhoids    High cholesterol    HTN (hypertension)    Hyperlipidemia    Hypothyroidism    subclinical hypothyroidism    Macular degeneration    Morbid obesity with BMI of 50.0-59.9, adult (HCC)    Prediabetes    Pregnancy (HCC)    Per NextGen:  \" 2, Para 1, Miscarriage, 1 .\"    Supraventricular tachycardia (HCC)    Management:  AV node ablation     Vitamin B12 deficiency       Past Surgical History:   Procedure Laterality Date    Ablation      AV node ablation          Cholecystectomy      Colonoscopy N/A 2017    Procedure: COLONOSCOPY;  Surgeon: Vicente Bryant MD;  Location: [facility]    Hemorrhoidectomy      Other surgical history      laparotomy- adhesiolysis of bowel            Family History   Problem Relation Age of Onset    Other (Other) Father         copd    Heart Disorder Father     Melanoma Father     Cancer Father         Basal Cell Cancer    Diabetes Mother     Heart Disorder Mother          Congestive Heart Failure    Renal Disease Mother     Other (Other) Mother     Ovarian Cancer Maternal Grandmother     Stroke Sister     Diabetes Sister     Obesity Sister     Other (Other) Brother         cirrhosis liver    Other (Other) Other         No Family h    Other (Other) Other         No Family h       Social History     Socioeconomic History    Marital status:    Tobacco Use    Smoking status: Former     Current packs/day: 0.00     Average packs/day: 1 pack/day for 30.0 years (30.0 ttl pk-yrs)     Types: Cigarettes     Start date: 1966     Quit date: 1996     Years since quittin.5     Passive exposure: Past    Smokeless tobacco: Never    Tobacco comments:     Quit at 40 y/o   Vaping Use    Vaping status: Never Used   Substance and Sexual Activity    Alcohol use: Yes     Alcohol/week: 0.0 standard drinks of alcohol     Comment: socially    Drug use: No   Other Topics Concern    Caffeine Concern Yes     Comment: Coffee, 2 cups per week     Exercise No     Social Determinants of Health     Financial Resource Strain: Low Risk  (2024)    Financial Resource Strain     Med Affordability: No   Food Insecurity: No Food Insecurity (2024)    Food Insecurity     Food Insecurity: Never true   Transportation Needs: No Transportation Needs (2024)    Transportation Needs     Lack of Transportation: No   Housing Stability: Low Risk  (2024)    Housing Stability     Housing Instability: No       Review of Systems :  Constitutional: As per HPI  Respiratory: Negative for cough; (+) shortness of breath.    Cardiovascular: Negative for chest pain and palpitations.   Gastrointestinal: Negative for vomiting and abdominal pain.   Genitourinary: Negative for dysuria and hematuria.     Positive for stated complaint: headache, nasuea, RADHA  Other systems are as noted in HPI.  Constitutional and vital signs reviewed.      All other systems reviewed and negative except as noted above.    PSFH  elements reviewed from today and agreed except as otherwise stated in HPI.    Physical Exam     ED Triage Vitals [07/27/24 1647]   /70   Pulse 70   Resp 20   Temp 98.1 °F (36.7 °C)   Temp src    SpO2 93 %   O2 Device None (Room air)       Current:/70   Pulse 70   Temp 98.1 °F (36.7 °C)   Resp 20   Ht 154.9 cm (5' 1\")   Wt 113.4 kg   SpO2 93%   BMI 47.24 kg/m²         Physical Exam   Constitutional: No distress.  Nontoxic and well-appearing.  HEENT: MMM.  Head: Normocephalic.  Atraumatic.  Eyes: No injection.  No photophobia.    Neck: Neck supple. No meningismus.  Cardiovascular: RRR.   Pulmonary/Chest: Effort normal. CTAB.  Abdominal: Soft.  Nontender.  Musculoskeletal: No gross deformity.  Lower extremities without calf tenderness or edema.  Neurological: Alert.  Cranial nerves II through XII grossly intact.  Bilateral upper lower extremity 5/5 strength proximally.  Skin: Skin is warm.   Psychiatric: Cooperative.  Nursing note and vitals reviewed.        ED Course     Labs Reviewed   TSH W REFLEX TO FREE T4 - Abnormal; Notable for the following components:       Result Value    TSH 5.882 (*)     All other components within normal limits   URINALYSIS, ROUTINE - Abnormal; Notable for the following components:    Urine Color Colorless (*)     Leukocyte Esterase Urine 250 (*)     WBC Urine 6-10 (*)     Squamous Epi. Cells Few (*)     All other components within normal limits   D-DIMER - Abnormal; Notable for the following components:    D-Dimer 2.72 (*)     All other components within normal limits   COMP METABOLIC PANEL (14) - Normal   TROPONIN I HIGH SENSITIVITY - Normal   T4, FREE (S) - Normal   CBC WITH DIFFERENTIAL WITH PLATELET    Narrative:     The following orders were created for panel order CBC With Differential With Platelet.  Procedure                               Abnormality         Status                     ---------                               -----------         ------                      CBC W/ DIFFERENTIAL[[id number]]                              Final result                 Please view results for these tests on the individual orders.   COOXIMETRY   CBC W/ DIFFERENTIAL     EKG    Rate, intervals and axes as noted on EKG Report.  Rate: 69  Rhythm: Sinus Rhythm  Reading: NSR 69 with RBBB without obvious MARISSA and inferior biphasic/TWI new vs 10/2021 as independently interpreted by myself         CT CHEST PE AORTA (IV ONLY) (CPT=71260)    Result Date: 7/27/2024  PROCEDURE: CT CHEST PE AORTA (IV ONLY) (CPT=71260)  COMPARISON: [facility], CT CHEST WO CONTRAST, 4/13/2013, 8:35 AM.  INDICATIONS: Shortness of breath.  Difficulty in breathing, headache, and nausea.  TECHNIQUE: CT images of the chest were obtained with non-ionic intravenous contrast material.  Automated exposure control for dose reduction was used. Adjustment of the mA and/or kV was done based on the patient's size. Use of iterative reconstruction technique for dose reduction was used. Dose information is transmitted to the ACR (American College of Radiology) NRDR (National Radiology Data Registry) which includes the Dose Index Registry.  FINDINGS:  CARDIAC: Mild coronary artery calcification. MEDIASTINUM/AMY: Normal.  No mass or adenopathy.  PULMONARY ARTERIES: Enlarged main pulmonary artery with transverse dimension of 3.7 cm. No evidence for pulmonary embolus through segmental level.  AORTA: Normal.  No aneurysm or dissection. LUNGS/PLEURA: Thickening of central central bronchi and some occluded subsegmental bronchi in both lower lobes.  Heterogeneous lung attenuation bilaterally.  A 7 mm right lower lobe perifissural nodule measured 5 mm in 2013, a 8 mm right lower lobe pulmonary nodule image 70 series 4 is new, and a 8 mm peripheral right lower lobe nodule image 69 series 4 is unchanged.  A calcified 3 mm right lower lobe pulmonary granuloma.  A 3 mm peripheral right upper lobe pulmonary nodule which is  unchanged from 2013. CHEST WALL: Normal.  No mass or axillary adenopathy.  LIMITED ABDOMEN: Small hiatal hernia.  Post cholecystectomy.  No suspect abnormality. BONES: Degenerative changes in spine. No suspect bone lesion or fracture.  OTHER: Negative.          CONCLUSION:  1. No pulmonary embolus through segmental level.  Respiratory motion artifact preclude evaluation for smaller more distal emboli. 2. Enlarged main pulmonary artery suggests pulmonary hypertension. 3. Thickened bronchi bilaterally and some occluded subsegmental bronchi in both lower lobes.  Findings compatible with bronchitis and bronchiolitis which may be related to infection and or aspiration. 4. Heterogeneous lung attenuation related to a combination of air trapping, respiratory motion, and atelectasis. 5. A new 8 mm right lower lobe pulmonary nodule relative to 2013. Follow-up CT scan in 3 months recommended. 6. Other pulmonary nodules are not significantly changed when accounting for the time differential. 7. Mild coronary artery calcification.     Dictated by (CST): Henok Avila MD on 7/27/2024 at 7:11 PM     Finalized by (CST): Henok Avila MD on 7/27/2024 at 7:20 PM          XR CHEST AP PORTABLE  (CPT=71045)    Result Date: 7/27/2024  PROCEDURE: XR CHEST AP PORTABLE  (CPT=71045) TIME: 5:21 p.m..   COMPARISON: [facility], XR CHEST PA + LAT CHEST (CPT=71046), 2/05/2024, 2:17 PM.  INDICATIONS: headache, nasuea, shortness of breath, difficulty breathing.  TECHNIQUE:   Single view.   FINDINGS:  CARDIAC/VASC: Heart size and pulmonary vascularity normal.  MEDIAST/AMY:   No visible mass or adenopathy. LUNGS/PLEURA: No consolidation or pleural effusion. BONES: No fracture or visible bony lesion. OTHER: Negative.          CONCLUSION: Normal examination.     Dictated by (CST): Henok Avila MD on 7/27/2024 at 6:06 PM     Finalized by (CST): Henok Avila MD on 7/27/2024 at 6:07 PM                [facility]   DIFFERENTIAL DIAGNOSIS:  After history and physical exam differential diagnosis includes but is not limited to carbon oxide exposure/toxicity, ACS, myocarditis, VTE, anemia, electrolyte derangement, thyroid arrangement, UTI, JAY with secondary complications/pulm hypertension.    Pulse ox: 93%:Normal on RA, as independently interpreted by myself    Cardiac Monitor Interpretation:   Pulse Readings from Last 1 Encounters:   07/27/24 70   , sinus,     Medical Decision Making  evaluation for multiple subacute complaints manifesting as exertional shortness of breath and daytime somnolence in setting of longstanding snoring history with obese habitus noted and clinical concern for likely JAY/obesity hypoventilation syndrome.  Given exertional complaints in low risk HEART/Wells patient, troponin sent and nonacute though elevated dimer noted for which CT obtained and without PE though with concern for pulmonary hypertension in addition to the pulmonary nodule in addition to concern for possible aspiration.  Urinalysis noted which culture sent and appeared antibiotic to initiate for both urinary and pulmonary coverage.  Patient denies of concern for JAY and secondary complications with need for sleep study and further management including likely CPAP for which pulmonary follow-up provided.    Problems Addressed:  Abnormal urinalysis: undiagnosed new problem with uncertain prognosis  Aspiration pneumonitis (HCC): undiagnosed new problem with uncertain prognosis  Dyspnea, unspecified type: undiagnosed new problem with uncertain prognosis  Other fatigue: acute illness or injury    Amount and/or Complexity of Data Reviewed  External Data Reviewed: labs, radiology, ECG and notes.     Details: 10/7/2021 EKG reviewed; 2/15/2024 ED notes/labs/CT imaging reviewed  Labs: ordered. Decision-making details documented in ED Course.  Radiology: ordered and independent interpretation performed. Decision-making details documented in ED Course.     Details: CXR  without obvious pneumothorax as independently interpreted by myself    ECG/medicine tests: ordered and independent interpretation performed. Decision-making details documented in ED Course.    Risk  Prescription drug management.      I was wearing at minimum a facemask and eye protection throughout this encounter with handwashing performed prior and after patient evaluation without personal hand/facial/oropharyngeal contact and gloves worn throughout encounter. See note and/or contact this provider for further PPE details.    Disposition and Plan     Clinical Impression:  1. Dyspnea, unspecified type    2. Other fatigue    3. Aspiration pneumonitis (HCC)    4. Abnormal urinalysis        Disposition:  Discharge    Follow-up:  Christos Shore MD  [address] [place] [address]  [facility] [id number] [phone]    Call  For followup and re-evaluation.    Peter Lopez MD  [address] [place] [address]  [facility] [id number] [phone]    Call  For outpatient sleep study.      Medications Prescribed:  Discharge Medication List as of 7/27/2024  8:30 PM        START taking these medications    Details   amoxicillin clavulanate 875-125 MG Oral Tab Take 1 tablet by mouth 2 (two) times daily for 7 days., Normal, Disp-14 tablet, R-0
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
## 2024-07-28 LAB
ATRIAL RATE: 69 BPM
P AXIS: 53 DEGREES
P-R INTERVAL: 142 MS
Q-T INTERVAL: 434 MS
QRS DURATION: 146 MS
QTC CALCULATION (BEZET): 465 MS
R AXIS: 33 DEGREES
T AXIS: -23 DEGREES
VENTRICULAR RATE: 69 BPM

## 2024-07-29 ENCOUNTER — PATIENT OUTREACH (OUTPATIENT)
Dept: CASE MANAGEMENT | Age: 69
End: 2024-07-29

## 2024-07-30 NOTE — PROGRESS NOTES
Pt had recent ED visit, calling to offer [facility] ED follow-up apt (discharged 07/27)    Dr Christos Shore  Internal Medicine  [address] [place] [address]  [address]  [place] [address]  [phone]  M to call [phone] to assist w/scheduling apt

## 2024-07-31 NOTE — PROGRESS NOTES
Pt had recent ED visit, calling to offer [facility] ED follow-up apt (discharged 07/27)     Dr Christos Shore  Internal Medicine  [address] [place] [address]  [address]  [place] [address]  [phone]  M to call [phone] to assist w/scheduling apt

## 2024-08-01 ENCOUNTER — TELEPHONE (OUTPATIENT)
Dept: INTERNAL MEDICINE CLINIC | Facility: CLINIC | Age: 69
End: 2024-08-01

## 2024-08-01 NOTE — TELEPHONE ENCOUNTER
Bedside scheduling called to make a hospital follow up for patient. First available was offered but tori said it was to far out. Other providers were offered but declined. Patient would rather see . Please advise.

## 2024-08-01 NOTE — TELEPHONE ENCOUNTER
Spoke with patient, verified , ER follow up appointment scheduled on 8/3 at 11:45am, patient verbalized understanding.

## 2024-08-01 NOTE — TELEPHONE ENCOUNTER
I know she is working so if she is available this Saturday 8/3/24, I can squeeze her in at 11:45am. thanks

## 2024-08-01 NOTE — PROGRESS NOTES
Pt had recent ED visit, calling to offer [facility] ED follow-up apt (discharged 07/27)     Dr Christos Shore  Internal Medicine  [address] [facility]  [address]  [place] [address]  [phone]  Dr Golden's office will call pt, due to no availability in needed time frame  Pt verbalized understanding  Closing encounter

## 2024-08-03 ENCOUNTER — LAB ENCOUNTER (OUTPATIENT)
Dept: LAB | Age: 69
End: 2024-08-03
Attending: INTERNAL MEDICINE
Payer: MEDICARE

## 2024-08-03 ENCOUNTER — OFFICE VISIT (OUTPATIENT)
Dept: INTERNAL MEDICINE CLINIC | Facility: CLINIC | Age: 69
End: 2024-08-03

## 2024-08-03 VITALS
OXYGEN SATURATION: 96 % | SYSTOLIC BLOOD PRESSURE: 124 MMHG | WEIGHT: 265.38 LBS | BODY MASS INDEX: 50.1 KG/M2 | TEMPERATURE: 98 F | DIASTOLIC BLOOD PRESSURE: 80 MMHG | HEIGHT: 61 IN | HEART RATE: 64 BPM

## 2024-08-03 DIAGNOSIS — I28.8 ENLARGED PULMONARY ARTERY (HCC): ICD-10-CM

## 2024-08-03 DIAGNOSIS — I25.10 ATHEROSCLEROSIS OF NATIVE CORONARY ARTERY OF NATIVE HEART WITHOUT ANGINA PECTORIS: ICD-10-CM

## 2024-08-03 DIAGNOSIS — N30.00 ACUTE CYSTITIS WITHOUT HEMATURIA: ICD-10-CM

## 2024-08-03 DIAGNOSIS — R91.1 PULMONARY NODULE: ICD-10-CM

## 2024-08-03 DIAGNOSIS — J20.9 ACUTE BRONCHITIS, UNSPECIFIED ORGANISM: Primary | ICD-10-CM

## 2024-08-03 LAB
BILIRUB UR QL: NEGATIVE
CLARITY UR: CLEAR
GLUCOSE UR-MCNC: NORMAL MG/DL
HGB UR QL STRIP.AUTO: NEGATIVE
KETONES UR-MCNC: NEGATIVE MG/DL
LEUKOCYTE ESTERASE UR QL STRIP.AUTO: NEGATIVE
NITRITE UR QL STRIP.AUTO: NEGATIVE
PH UR: 5.5 [PH] (ref 5–8)
PROT UR-MCNC: NEGATIVE MG/DL
SP GR UR STRIP: 1.02 (ref 1–1.03)
UROBILINOGEN UR STRIP-ACNC: NORMAL

## 2024-08-03 PROCEDURE — 81003 URINALYSIS AUTO W/O SCOPE: CPT

## 2024-08-03 PROCEDURE — 99214 OFFICE O/P EST MOD 30 MIN: CPT | Performed by: INTERNAL MEDICINE

## 2024-08-04 NOTE — PROGRESS NOTES
Subjective:     Patient ID: Qing Miranda is a 69 year old female.    Pt presents today for post ER ffup. Was seen in ER with complaint of not feeling well, some SOB, fatigued and increasing daytime somnolence for past 4 weeks.   Labs done showed negative troponin, d dimer positive, thus ct chest done. No PE, bronchitis/bronchiolitis noted, mild coronary calcification and new 8mm lung nodule. Ua showed some pyuria. Pt was discharged on augmentin.        History/Other:   Review of Systems   Constitutional: Negative.    Respiratory:  Positive for shortness of breath. Negative for cough and wheezing.    Cardiovascular: Negative.  Negative for chest pain, palpitations and leg swelling.   Gastrointestinal: Negative.    Genitourinary:  Negative for dysuria and hematuria.     Current Outpatient Medications   Medication Sig Dispense Refill    fluticasone propionate 50 MCG/ACT Nasal Suspension SHAKE LIQUID AND USE 2 SPRAYS IN EACH NOSTRIL DAILY 1 each 2    cyanocobalamin 1000 MCG/ML Injection Solution INJECT 1 ML INTO THE MUSCLE EVERY 15 DAYS 24 mL 1    atorvastatin 20 MG Oral Tab Take 1 tablet (20 mg total) by mouth nightly. 90 tablet 0    furosemide 20 MG Oral Tab Take 1 tablet (20 mg total) by mouth daily as needed. 30 tablet 0    Potassium Chloride ER 10 MEQ Oral Tab CR Take 1 tablet (10 mEq total) by mouth daily as needed. 30 tablet 0    Homeopathic Products (IRRITATED EYE RELIEF OP) Apply to eye.      Syringe/Needle, Disp, (BD LUER-MARGARITO SYRINGE) 25G X 1\" 3 ML Does not apply Misc USE TWICE A MONTH AS DIRECTED 24 each 2     Allergies:  Allergies   Allergen Reactions    Ciprofloxacin HIVES       Past Medical History:    Diverticulosis    Frequent UTI    Hemorrhoids    High cholesterol    HTN (hypertension)    Hyperlipidemia    Hypothyroidism    subclinical hypothyroidism    Macular degeneration    Morbid obesity with BMI of 50.0-59.9, adult (HCC)    Prediabetes    Pregnancy (HCC)    Per NextGen:  \" 2, Para 1,  Miscarriage, 1 .\"    Supraventricular tachycardia (HCC)    Management:  AV node ablation     Vitamin B12 deficiency      Past Surgical History:   Procedure Laterality Date    Ablation      AV node ablation          Cholecystectomy      Colonoscopy N/A 2017    Procedure: COLONOSCOPY;  Surgeon: Vicente Bryant MD;  Location: [facility] ENDOSCOPY    Hemorrhoidectomy      Other surgical history      laparotomy- adhesiolysis of bowel      Family History   Problem Relation Age of Onset    Other (Other) Father         copd    Heart Disorder Father     Melanoma Father     Cancer Father         Basal Cell Cancer    Diabetes Mother     Heart Disorder Mother         Congestive Heart Failure    Renal Disease Mother     Other (Other) Mother     Ovarian Cancer Maternal Grandmother     Stroke Sister     Diabetes Sister     Obesity Sister     Other (Other) Brother         cirrhosis liver    Other (Other) Other         No Family h    Other (Other) Other         No Family h      Social History:   Social History     Socioeconomic History    Marital status:    Tobacco Use    Smoking status: Former     Current packs/day: 0.00     Average packs/day: 1 pack/day for 30.0 years (30.0 ttl pk-yrs)     Types: Cigarettes     Start date: 1966     Quit date: 1996     Years since quittin.6     Passive exposure: Past    Smokeless tobacco: Never    Tobacco comments:     Quit at 40 y/o   Vaping Use    Vaping status: Never Used   Substance and Sexual Activity    Alcohol use: Yes     Alcohol/week: 0.0 standard drinks of alcohol     Comment: socially    Drug use: No   Other Topics Concern    Caffeine Concern Yes     Comment: Coffee, 2 cups per week     Exercise No     Social Determinants of Health     Financial Resource Strain: Low Risk  (2024)    Financial Resource Strain     Med Affordability: No   Food Insecurity: No Food Insecurity (2024)    Food Insecurity     Food Insecurity: Never true    Transportation Needs: No Transportation Needs (2/16/2024)    Transportation Needs     Lack of Transportation: No   Housing Stability: Low Risk  (2/16/2024)    Housing Stability     Housing Instability: No        Objective:   Physical Exam  Constitutional:       General: She is not in acute distress.     Appearance: She is well-developed. She is obese. She is not ill-appearing, toxic-appearing or diaphoretic.   HENT:      Head: Normocephalic and atraumatic.      Right Ear: External ear normal.      Left Ear: External ear normal.      Nose: Nose normal.      Mouth/Throat:      Pharynx: No oropharyngeal exudate.   Eyes:      General: No scleral icterus.        Right eye: No discharge.         Left eye: No discharge.      Conjunctiva/sclera: Conjunctivae normal.      Pupils: Pupils are equal, round, and reactive to light.   Neck:      Vascular: No JVD.   Cardiovascular:      Rate and Rhythm: Normal rate and regular rhythm.      Heart sounds: Normal heart sounds. No murmur heard.  Pulmonary:      Effort: Pulmonary effort is normal. No respiratory distress.      Breath sounds: Normal breath sounds. No wheezing or rales.   Abdominal:      General: Bowel sounds are normal. There is no distension.      Palpations: Abdomen is soft. There is no mass.      Tenderness: There is no abdominal tenderness. There is no guarding or rebound.   Musculoskeletal:         General: No tenderness. Normal range of motion.      Cervical back: Normal range of motion and neck supple.      Right lower leg: No edema.      Left lower leg: No edema.   Lymphadenopathy:      Cervical: No cervical adenopathy.   Skin:     General: Skin is warm and dry.      Coloration: Skin is not jaundiced or pale.      Findings: No rash.   Neurological:      Mental Status: She is alert and oriented to person, place, and time.         Assessment & Plan:   (J20.9) Acute bronchitis, unspecified organism  (primary encounter diagnosis)  Plan: d/w pt, likely had been  viral but was also given augmentin in ER, which would cover bacterial causes     (N30.00) Acute cystitis without hematuria  Plan: Urinalysis with Culture Reflex        Pt had been given augmentin in ER. Repeat ua.     (R91.1) Pulmonary nodule  Plan: CT CHEST (CPT=71250)        New 8mm lungnodule seen on ct chest in ER, recheck in 3mos     (I25.10) Atherosclerosis of native coronary artery of native heart without angina pectoris  Plan: CARD NUC STRESS TEST LEXISCAN (CPT         07353/86721/)        Mild coronary calcifications seen in chest ct, troponin negative; no PE,   Pt told to do lexiscan stress test to further evaluate.     (I28.8) Enlarged pulmonary artery (HCC)  Plan: seen on CT scan; pt told to do 2decho to evaluate.        Orders Placed This Encounter   Procedures    Urinalysis with Culture Reflex       Meds This Visit:  Requested Prescriptions      No prescriptions requested or ordered in this encounter       Imaging & Referrals:  CARD NUC STRESS TEST LEXISCAN (CPT 39722/02296/)  CT CHEST (ERE=68774)

## 2024-08-09 ENCOUNTER — OFFICE VISIT (OUTPATIENT)
Dept: PULMONOLOGY | Facility: CLINIC | Age: 69
End: 2024-08-09
Payer: MEDICARE

## 2024-08-09 VITALS
DIASTOLIC BLOOD PRESSURE: 73 MMHG | RESPIRATION RATE: 16 BRPM | HEIGHT: 61 IN | SYSTOLIC BLOOD PRESSURE: 139 MMHG | BODY MASS INDEX: 50 KG/M2 | HEART RATE: 61 BPM | OXYGEN SATURATION: 95 % | WEIGHT: 264.81 LBS

## 2024-08-09 DIAGNOSIS — G47.33 OSA (OBSTRUCTIVE SLEEP APNEA): ICD-10-CM

## 2024-08-09 DIAGNOSIS — J44.9 CHRONIC OBSTRUCTIVE PULMONARY DISEASE, UNSPECIFIED COPD TYPE (HCC): Primary | ICD-10-CM

## 2024-08-09 DIAGNOSIS — R91.1 LUNG NODULE: ICD-10-CM

## 2024-08-09 PROCEDURE — 99204 OFFICE O/P NEW MOD 45 MIN: CPT | Performed by: INTERNAL MEDICINE

## 2024-08-09 RX ORDER — AZITHROMYCIN 250 MG/1
TABLET, FILM COATED ORAL
Qty: 6 TABLET | Refills: 0 | Status: SHIPPED | OUTPATIENT
Start: 2024-08-09

## 2024-08-09 NOTE — H&P
[facility]    Pulmonary consult     Qing Miranda Patient Status:  Specimen    1955 MRN [id number]   Location [facility] Attending No att. providers found   Hosp Day # 0 PCP Christos Shore MD     Date:  2024    History provided by:patient  HPI:     Chief Complaint   Patient presents with    Consult     CT Scan/falling asleep at the wheel/still has fatigue/shortness of breath with fast movement and has chest discomfort     HPI    This is a very pleasant 69-year-old female with history of HTN, HL, prediabetic, obesity with a BMI of 50  25-pack-year history of smoking quit in .    Patient had recent visit to ER on 2024 with cough and dyspnea and chest CT showed no PE with evidence of bronchitis and new 8 mm right lower lobe nodule  Still with some dyspnea with deep inspiration and occasional cough  No fever or chills  Occasional lower extremity pitting edema and takes Lasix as needed  No dyspnea at rest  Okay to walk straight 3-4 blocks with dyspnea upon exertion with climbing stairs  Significant loud snoring and apnea and daytime sleepiness and fatigue and her ESS 18  Regular time in bed  Not refreshed in the morning  Denied chest pain orthopnea or PND  Denied hemoptysis  Denied TB or exposure    History     Past Medical History:    Diverticulosis    Frequent UTI    Hemorrhoids    High cholesterol    HTN (hypertension)    Hyperlipidemia    Hypothyroidism    subclinical hypothyroidism    Macular degeneration    Morbid obesity with BMI of 50.0-59.9, adult (HCC)    Prediabetes    Pregnancy (HCC)    Per NextGen:  \" 2, Para 1, Miscarriage, 1 .\"    Supraventricular tachycardia (HCC)    Management:  AV node ablation     Vitamin B12 deficiency     Past Surgical History:   Procedure Laterality Date    Ablation      AV node ablation          Cholecystectomy      Colonoscopy  N/A 2017    Procedure: COLONOSCOPY;  Surgeon: Vicente Bryant MD;  Location: [facility] ENDOSCOPY    Hemorrhoidectomy      Other surgical history      laparotomy- adhesiolysis of bowel     Family History   Problem Relation Age of Onset    Other (Other) Father         copd    Heart Disorder Father     Melanoma Father     Cancer Father         Basal Cell Cancer    Diabetes Mother     Heart Disorder Mother         Congestive Heart Failure    Renal Disease Mother     Other (Other) Mother     Ovarian Cancer Maternal Grandmother     Stroke Sister     Diabetes Sister     Obesity Sister     Other (Other) Brother         cirrhosis liver    Other (Other) Other         No Family h    Other (Other) Other         No Family h     Social History:  Social History     Socioeconomic History    Marital status:    Tobacco Use    Smoking status: Former     Current packs/day: 0.00     Average packs/day: 1 pack/day for 30.0 years (30.0 ttl pk-yrs)     Types: Cigarettes     Start date: 1966     Quit date: 1996     Years since quittin.6     Passive exposure: Past    Smokeless tobacco: Never    Tobacco comments:     Quit at 42 y/o   Vaping Use    Vaping status: Never Used   Substance and Sexual Activity    Alcohol use: Yes     Alcohol/week: 0.0 standard drinks of alcohol     Comment: socially    Drug use: No   Other Topics Concern    Caffeine Concern Yes     Comment: Coffee, 2 cups per week     Exercise No     Social Determinants of Health     Financial Resource Strain: Low Risk  (2024)    Financial Resource Strain     Med Affordability: No   Food Insecurity: No Food Insecurity (2024)    Food Insecurity     Food Insecurity: Never true   Transportation Needs: No Transportation Needs (2024)    Transportation Needs     Lack of Transportation: No   Housing Stability: Low Risk  (2024)    Housing Stability     Housing Instability: No     Allergies/Medications:   Allergies:   Allergies   Allergen Reactions     Ciprofloxacin HIVES     (Not in a hospital admission)      Review of Systems:   Review of Systems    Physical Exam:   Vital Signs:  Blood pressure 139/73, pulse 61, resp. rate 16, height 5' 1\" (1.549 m), weight 264 lb 12.8 oz (120.1 kg), SpO2 95%, not currently breastfeeding.  Physical Exam      Results:     Lab Results   Component Value Date    WBC 8.6 07/27/2024    HGB 13.3 07/27/2024    HCT 42.0 07/27/2024    .0 07/27/2024    CREATSERUM 0.83 07/27/2024    BUN 16 07/27/2024     07/27/2024    K 3.7 07/27/2024     07/27/2024    CO2 29.0 07/27/2024    GLU 86 07/27/2024    CA 9.5 07/27/2024    ALB 4.6 07/27/2024    ALKPHO 113 07/27/2024    BILT 0.4 07/27/2024    TP 7.3 07/27/2024    AST 12 07/27/2024    ALT 11 07/27/2024    T4F 1.1 07/27/2024    TSH 5.882 (H) 07/27/2024    LIP 65 (L) 10/07/2021    DDIMER 2.72 (H) 07/27/2024    MG 1.9 10/10/2021    PHOS 3.7 11/30/2021    TROPHS 4 07/27/2024    B12 489 04/29/2021     Chest ct 7/27/2024 reviewed ;  FINDINGS:  CARDIAC: Mild coronary artery calcification.  MEDIASTINUM/AMY: Normal.  No mass or adenopathy.    PULMONARY ARTERIES: Enlarged main pulmonary artery with transverse dimension of 3.7 cm. No evidence for pulmonary embolus through segmental level.    AORTA: Normal.  No aneurysm or dissection.  LUNGS/PLEURA: Thickening of central central bronchi and some occluded subsegmental bronchi in both lower lobes.  Heterogeneous lung attenuation bilaterally.  A 7 mm right lower lobe perifissural nodule measured 5 mm in 2013, a 8 mm right lower lobe  pulmonary nodule image 70 series 4 is new, and a 8 mm peripheral right lower lobe nodule image 69 series 4 is unchanged.  A calcified 3 mm right lower lobe pulmonary granuloma.  A 3 mm peripheral right upper lobe pulmonary nodule which is unchanged from  2013.  CHEST WALL: Normal.  No mass or axillary adenopathy.    LIMITED ABDOMEN: Small hiatal hernia.  Post cholecystectomy.  No suspect abnormality.  BONES:  Degenerative changes in spine. No suspect bone lesion or fracture.     OTHER: Negative.                 Impression   CONCLUSION:  1. No pulmonary embolus through segmental level.  Respiratory motion artifact preclude evaluation for smaller more distal emboli.  2. Enlarged main pulmonary artery suggests pulmonary hypertension.  3. Thickened bronchi bilaterally and some occluded subsegmental bronchi in both lower lobes.  Findings compatible with bronchitis and bronchiolitis which may be related to infection and or aspiration.  4. Heterogeneous lung attenuation related to a combination of air trapping, respiratory motion, and atelectasis.  5. A new 8 mm right lower lobe pulmonary nodule relative to 2013. Follow-up CT scan in 3 months recommended.  6. Other pulmonary nodules are not significantly changed when accounting for the time differential.  7. Mild coronary artery calcification.         Assessment/Plan:     1-possible COPD with mild acute exacerbation  25-pack-year history of smoking quit 1996  Recent visit to ER on 7/27/2024 and chest CT as discussed above    Plan ;  Course of Z-Renaldo antibiotics  Albuterol as needed  Baseline PFTs    2-right lower lobe 8 mm lung nodule new on chest CT comparing to old CTs  Follow-up chest CT in 3 months    3-obesity with high possibility for severe JAY  BMI 50  Upper airway class III Mallampati score  ESS 18  Patient preferred to have in lab sleep study and hopefully split diagnostic and titration    Plan ;  Sleep study  Diet and exercise and weight reduction  Avoid driving if sleepy  Avoid sedative and narcotic and alcohol      Follow-up in 3 months              Marques James MD  8/9/2024

## 2024-08-27 ENCOUNTER — LAB ENCOUNTER (OUTPATIENT)
Dept: LAB | Age: 69
End: 2024-08-27
Attending: INTERNAL MEDICINE
Payer: MEDICARE

## 2024-08-27 ENCOUNTER — NURSE TRIAGE (OUTPATIENT)
Dept: INTERNAL MEDICINE CLINIC | Facility: CLINIC | Age: 69
End: 2024-08-27

## 2024-08-27 ENCOUNTER — OFFICE VISIT (OUTPATIENT)
Dept: INTERNAL MEDICINE CLINIC | Facility: CLINIC | Age: 69
End: 2024-08-27
Payer: MEDICARE

## 2024-08-27 ENCOUNTER — HOSPITAL ENCOUNTER (OUTPATIENT)
Dept: GENERAL RADIOLOGY | Age: 69
Discharge: HOME | End: 2024-08-27
Attending: INTERNAL MEDICINE
Payer: MEDICARE

## 2024-08-27 ENCOUNTER — EKG ENCOUNTER (OUTPATIENT)
Dept: LAB | Age: 69
End: 2024-08-27
Attending: INTERNAL MEDICINE
Payer: MEDICARE

## 2024-08-27 VITALS
WEIGHT: 261 LBS | TEMPERATURE: 98 F | BODY MASS INDEX: 49 KG/M2 | HEART RATE: 60 BPM | DIASTOLIC BLOOD PRESSURE: 80 MMHG | SYSTOLIC BLOOD PRESSURE: 128 MMHG | OXYGEN SATURATION: 95 %

## 2024-08-27 DIAGNOSIS — R07.2 PRECORDIAL PAIN: ICD-10-CM

## 2024-08-27 DIAGNOSIS — R40.0 DAYTIME SOMNOLENCE: ICD-10-CM

## 2024-08-27 DIAGNOSIS — R53.83 OTHER FATIGUE: ICD-10-CM

## 2024-08-27 DIAGNOSIS — R53.83 OTHER FATIGUE: Primary | ICD-10-CM

## 2024-08-27 LAB
ANION GAP SERPL CALC-SCNC: 5 MMOL/L (ref 0–18)
ATRIAL RATE: 64 BPM
BASOPHILS # BLD AUTO: 0.05 X10(3) UL (ref 0–0.2)
BASOPHILS NFR BLD AUTO: 0.6 %
BILIRUB UR QL: NEGATIVE
BUN BLD-MCNC: 15 MG/DL (ref 9–23)
BUN/CREAT SERPL: 21.1 (ref 10–20)
CALCIUM BLD-MCNC: 9.7 MG/DL (ref 8.7–10.4)
CHLORIDE SERPL-SCNC: 107 MMOL/L (ref 98–112)
CO2 SERPL-SCNC: 30 MMOL/L (ref 21–32)
COLOR UR: YELLOW
CREAT BLD-MCNC: 0.71 MG/DL
DEPRECATED RDW RBC AUTO: 43.2 FL (ref 35.1–46.3)
EGFRCR SERPLBLD CKD-EPI 2021: 93 ML/MIN/1.73M2 (ref 60–?)
EOSINOPHIL # BLD AUTO: 0.24 X10(3) UL (ref 0–0.7)
EOSINOPHIL NFR BLD AUTO: 2.7 %
ERYTHROCYTE [DISTWIDTH] IN BLOOD BY AUTOMATED COUNT: 13.7 % (ref 11–15)
FASTING STATUS PATIENT QL REPORTED: YES
FOLATE SERPL-MCNC: 7.3 NG/ML (ref 5.4–?)
GLUCOSE BLD-MCNC: 92 MG/DL (ref 70–99)
GLUCOSE UR-MCNC: NORMAL MG/DL
HCT VFR BLD AUTO: 42.7 %
HGB BLD-MCNC: 13.9 G/DL
HGB UR QL STRIP.AUTO: NEGATIVE
IMM GRANULOCYTES # BLD AUTO: 0.02 X10(3) UL (ref 0–1)
IMM GRANULOCYTES NFR BLD: 0.2 %
KETONES UR-MCNC: NEGATIVE MG/DL
LEUKOCYTE ESTERASE UR QL STRIP.AUTO: 75
LYMPHOCYTES # BLD AUTO: 2.53 X10(3) UL (ref 1–4)
LYMPHOCYTES NFR BLD AUTO: 28.3 %
MCH RBC QN AUTO: 28.1 PG (ref 26–34)
MCHC RBC AUTO-ENTMCNC: 32.6 G/DL (ref 31–37)
MCV RBC AUTO: 86.4 FL
MONOCYTES # BLD AUTO: 0.51 X10(3) UL (ref 0.1–1)
MONOCYTES NFR BLD AUTO: 5.7 %
NEUTROPHILS # BLD AUTO: 5.6 X10 (3) UL (ref 1.5–7.7)
NEUTROPHILS # BLD AUTO: 5.6 X10(3) UL (ref 1.5–7.7)
NEUTROPHILS NFR BLD AUTO: 62.5 %
NITRITE UR QL STRIP.AUTO: NEGATIVE
OSMOLALITY SERPL CALC.SUM OF ELEC: 294 MOSM/KG (ref 275–295)
P AXIS: 51 DEGREES
P-R INTERVAL: 154 MS
PH UR: 5 [PH] (ref 5–8)
PLATELET # BLD AUTO: 335 10(3)UL (ref 150–450)
POTASSIUM SERPL-SCNC: 3.9 MMOL/L (ref 3.5–5.1)
PROT UR-MCNC: NEGATIVE MG/DL
Q-T INTERVAL: 466 MS
QRS DURATION: 134 MS
QTC CALCULATION (BEZET): 480 MS
R AXIS: 44 DEGREES
RBC # BLD AUTO: 4.94 X10(6)UL
SODIUM SERPL-SCNC: 142 MMOL/L (ref 136–145)
SP GR UR STRIP: 1.02 (ref 1–1.03)
T AXIS: -13 DEGREES
TROPONIN I SERPL HS-MCNC: 4 NG/L
UROBILINOGEN UR STRIP-ACNC: NORMAL
VENTRICULAR RATE: 64 BPM
VIT B12 SERPL-MCNC: 489 PG/ML (ref 211–911)
WBC # BLD AUTO: 9 X10(3) UL (ref 4–11)

## 2024-08-27 PROCEDURE — 93005 ELECTROCARDIOGRAM TRACING: CPT

## 2024-08-27 PROCEDURE — 85025 COMPLETE CBC W/AUTO DIFF WBC: CPT

## 2024-08-27 PROCEDURE — 81001 URINALYSIS AUTO W/SCOPE: CPT

## 2024-08-27 PROCEDURE — 36415 COLL VENOUS BLD VENIPUNCTURE: CPT | Performed by: INTERNAL MEDICINE

## 2024-08-27 PROCEDURE — 99213 OFFICE O/P EST LOW 20 MIN: CPT | Performed by: INTERNAL MEDICINE

## 2024-08-27 PROCEDURE — 82607 VITAMIN B-12: CPT

## 2024-08-27 PROCEDURE — 82746 ASSAY OF FOLIC ACID SERUM: CPT | Performed by: INTERNAL MEDICINE

## 2024-08-27 PROCEDURE — 71046 X-RAY EXAM CHEST 2 VIEWS: CPT | Performed by: INTERNAL MEDICINE

## 2024-08-27 PROCEDURE — 80048 BASIC METABOLIC PNL TOTAL CA: CPT

## 2024-08-27 PROCEDURE — 84484 ASSAY OF TROPONIN QUANT: CPT

## 2024-08-27 PROCEDURE — 87086 URINE CULTURE/COLONY COUNT: CPT

## 2024-08-27 PROCEDURE — 93010 ELECTROCARDIOGRAM REPORT: CPT | Performed by: STUDENT IN AN ORGANIZED HEALTH CARE EDUCATION/TRAINING PROGRAM

## 2024-08-27 RX ORDER — CEFPODOXIME PROXETIL 200 MG/1
200 TABLET, FILM COATED ORAL 2 TIMES DAILY
Qty: 20 TABLET | Refills: 0 | Status: SHIPPED | OUTPATIENT
Start: 2024-08-27

## 2024-08-27 RX ORDER — FUROSEMIDE 20 MG
20 TABLET ORAL
Qty: 30 TABLET | Refills: 0 | Status: SHIPPED | OUTPATIENT
Start: 2024-08-27

## 2024-08-27 RX ORDER — POTASSIUM CHLORIDE 750 MG/1
10 TABLET, EXTENDED RELEASE ORAL
Qty: 30 TABLET | Refills: 0 | Status: SHIPPED | OUTPATIENT
Start: 2024-08-27

## 2024-08-27 NOTE — TELEPHONE ENCOUNTER
Action Requested: Summary for Provider     []  Critical Lab, Recommendations Needed  [x] Need Additional Advice  []   FYI    []   Need Orders  [] Need Medications Sent to Pharmacy  []  Other     SUMMARY: Patient calling, she is having some fatigue and feeling tired so much that she has dozed off at work while looking at paperwork. She is drowsy. Patient says recent she has had UTI and PNA that she took meds for. She will feeling much better while on meds but now that she is off she is starting to feel this way. Patient says that she has some chest pain when she breathes in, she feels lungs are irritated. Pain is not constant. Patient is having some nausea and is not hungry feeling. Says when she has had this before, it's because she has UTI. Was to attend an activity last night but she was sleepy and tired so she did not go. She says this is just not her and would like to get in for follow up with MD     Please advise if you would be able to see her today     Patient not able to function well in work and home life due to be tired and fatigued.   Patient did say she is having come neuropathy in her left leg. This is not new to her, she has not had it in a while but since being ill it has returned.     No SOB. No lightheaded or dizzy. She did stay home from work today and would like to be seen by Dr. Shore today - prefers her MD vs scheduling with someone else.     ER warning signs given, she is agreeable to to if MD feels she needs to but would prefer to be seen in clinic and see her own MD     Reason for call: Fatigue  Onset: on and off since been sick (last month or so)  '    Reason for Disposition   MODERATE weakness (i.e., interferes with work, school, normal activities) and cause unknown (Exceptions: Weakness from acute minor illness or from poor fluid intake; weakness is chronic and not worse.)    Protocols used: Weakness (Generalized) and Fatigue-A-OH

## 2024-08-27 NOTE — PROGRESS NOTES
Subjective:     Patient ID: Qing Miranda is a 69 year old female.    Patient presents today with same complaints as she had when she went to ER about 4 weeks ago.  Pt states having severe fatigue, and even dozing off while at work. She said this had started about 2mos ago .  She also had intermittent chest pains for the past 2mos, denies having SOB, coughing, vomiting, diarrhea nor having urinary symptoms.         History/Other:   Review of Systems   Constitutional:  Positive for fatigue. Negative for appetite change, fever and unexpected weight change.   Respiratory: Negative.  Negative for cough, shortness of breath and wheezing.    Cardiovascular:  Positive for chest pain. Negative for palpitations.        Intermittent chest pains even before her recent ER visit.    Gastrointestinal: Negative.    Genitourinary:  Negative for dysuria, frequency and hematuria.     Current Outpatient Medications   Medication Sig Dispense Refill    fluticasone propionate 50 MCG/ACT Nasal Suspension SHAKE LIQUID AND USE 2 SPRAYS IN EACH NOSTRIL DAILY 1 each 2    cyanocobalamin 1000 MCG/ML Injection Solution INJECT 1 ML INTO THE MUSCLE EVERY 15 DAYS 24 mL 1    atorvastatin 20 MG Oral Tab Take 1 tablet (20 mg total) by mouth nightly. 90 tablet 0    furosemide 20 MG Oral Tab Take 1 tablet (20 mg total) by mouth daily as needed. 30 tablet 0    Potassium Chloride ER 10 MEQ Oral Tab CR Take 1 tablet (10 mEq total) by mouth daily as needed. 30 tablet 0    Homeopathic Products (IRRITATED EYE RELIEF OP) Apply to eye.      Syringe/Needle, Disp, (BD LUER-MARGARITO SYRINGE) 25G X 1\" 3 ML Does not apply Misc USE TWICE A MONTH AS DIRECTED 24 each 2     Allergies:  Allergies   Allergen Reactions    Ciprofloxacin HIVES       Past Medical History:    Diverticulosis    Frequent UTI    Hemorrhoids    High cholesterol    HTN (hypertension)    Hyperlipidemia    Hypothyroidism    subclinical hypothyroidism    Macular degeneration    Morbid obesity with BMI  of 50.0-59.9, adult (HCC)    Prediabetes    Pregnancy (HCC)    Per NextGen:  \" 2, Para 1, Miscarriage, 1 .\"    Supraventricular tachycardia (HCC)    Management:  AV node ablation     Vitamin B12 deficiency      Past Surgical History:   Procedure Laterality Date    Ablation      AV node ablation          Cholecystectomy      Colonoscopy N/A 2017    Procedure: COLONOSCOPY;  Surgeon: Vicente Bryant MD;  Location: [facility] ENDOSCOPY    Hemorrhoidectomy      Other surgical history      laparotomy- adhesiolysis of bowel      Family History   Problem Relation Age of Onset    Other (Other) Father         copd    Heart Disorder Father     Melanoma Father     Cancer Father         Basal Cell Cancer    Diabetes Mother     Heart Disorder Mother         Congestive Heart Failure    Renal Disease Mother     Other (Other) Mother     Ovarian Cancer Maternal Grandmother     Stroke Sister     Diabetes Sister     Obesity Sister     Other (Other) Brother         cirrhosis liver    Other (Other) Other         No Family h    Other (Other) Other         No Family h      Social History:   Social History     Socioeconomic History    Marital status:    Tobacco Use    Smoking status: Former     Current packs/day: 0.00     Average packs/day: 1 pack/day for 30.0 years (30.0 ttl pk-yrs)     Types: Cigarettes     Start date: 1966     Quit date: 1996     Years since quittin.6     Passive exposure: Past    Smokeless tobacco: Never    Tobacco comments:     Quit at 42 y/o   Vaping Use    Vaping status: Never Used   Substance and Sexual Activity    Alcohol use: Yes     Alcohol/week: 0.0 standard drinks of alcohol     Comment: socially    Drug use: No   Other Topics Concern    Caffeine Concern Yes     Comment: Coffee, 2 cups per week     Exercise No     Social Determinants of Health     Financial Resource Strain: Low Risk  (2024)    Financial Resource Strain     Med Affordability: No   Food  Insecurity: No Food Insecurity (2/16/2024)    Food Insecurity     Food Insecurity: Never true   Transportation Needs: No Transportation Needs (2/16/2024)    Transportation Needs     Lack of Transportation: No   Housing Stability: Low Risk  (2/16/2024)    Housing Stability     Housing Instability: No        Objective:   Physical Exam  Constitutional:       General: She is not in acute distress.     Appearance: She is obese. She is not ill-appearing, toxic-appearing or diaphoretic.   HENT:      Right Ear: External ear normal.      Left Ear: External ear normal.   Eyes:      General: No scleral icterus.        Right eye: No discharge.         Left eye: No discharge.      Conjunctiva/sclera: Conjunctivae normal.   Cardiovascular:      Rate and Rhythm: Normal rate and regular rhythm.      Heart sounds: Normal heart sounds. No murmur heard.     No friction rub. No gallop.   Pulmonary:      Effort: Pulmonary effort is normal. No respiratory distress.      Breath sounds: Normal breath sounds. No stridor. No wheezing, rhonchi or rales.   Abdominal:      General: Bowel sounds are normal. There is no distension.      Palpations: Abdomen is soft.      Tenderness: There is no abdominal tenderness. There is no guarding or rebound.   Musculoskeletal:      Cervical back: Normal range of motion and neck supple. No rigidity or tenderness.      Right lower leg: No edema.      Left lower leg: No edema.   Lymphadenopathy:      Cervical: No cervical adenopathy.   Skin:     Coloration: Skin is not jaundiced or pale.      Findings: No bruising, erythema or rash.   Neurological:      Mental Status: She is alert.         Assessment & Plan:   (R53.83) Other fatigue  (primary encounter diagnosis)  Plan: Urinalysis with Culture Reflex, XR CHEST PA +         LAT CHEST (CPT=71046), Folic Acid Serum         (Folate), Vitamin B12 with Reflex to MMA [E],         CBC With Differential With Platelet, Basic         Metabolic Panel (8), CANCELED: CBC  With         Differential With Platelet, CANCELED: Basic         Metabolic Panel (8)        Pt had similaer complaint on her Er visti and she is concerned bout having possible infection from lung or uti. We did do cxr showed left basilar atelectasis , will start pt on abx.  Awaiting urinalysis result     (R07.2) Precordial pain  Plan: Troponin I (High Sensitivity) [E], EKG 12 Lead         to be performed at [facility],         CBC With Differential With Platelet, Basic         Metabolic Panel (8)        Having intermittent chest pains for several months. Had been ordered before, he was advised to do the her stres test before which had been ordered about 4 weeks ago .  Her EKG showed RBBB and no ST Twave changes.     (R40.0) Daytime somnolence  Plan: pt has fatigue, daytime somnolence , obesity which put her at risk for JAY. It was already ordered by Dr James  and wad told to do sleep stucy.        No orders of the defined types were placed in this encounter.      Meds This Visit:  Requested Prescriptions     Pending Prescriptions Disp Refills    furosemide 20 MG Oral Tab 30 tablet 0     Sig: Take 1 tablet (20 mg total) by mouth daily as needed.    Potassium Chloride ER 10 MEQ Oral Tab CR 30 tablet 0     Sig: Take 1 tablet (10 mEq total) by mouth daily as needed.       Imaging & Referrals:  None

## 2024-08-27 NOTE — TELEPHONE ENCOUNTER
Spoke to patient and relayed Dr. Shore's message below. Patient verbalized understanding and had no further questions.     Future Appointments   Date Time Provider Department Center   8/27/2024 11:45 AM Christos Shore MD [facility]   11/3/2024 10:00 AM [facility] CT RM1 CARD [facility] [place]   11/6/2024  3:30 PM Marques James MD [facility]

## 2024-08-28 NOTE — TELEPHONE ENCOUNTER
Pharmacy requesting refill     furosemide 20 MG Oral Tab Take 1 tablet (20 mg total) by mouth daily as needed. 30 tablet 0      Potassium Chloride ER 10 MEQ Oral Tab CR Take 1 tablet (10 mEq total) by mouth daily as needed. 30 tablet 0

## 2024-08-30 RX ORDER — FUROSEMIDE 20 MG
20 TABLET ORAL
Qty: 30 TABLET | Refills: 0 | OUTPATIENT
Start: 2024-08-30

## 2024-08-30 RX ORDER — POTASSIUM CHLORIDE 750 MG/1
10 TABLET, EXTENDED RELEASE ORAL
Qty: 30 TABLET | Refills: 0 | OUTPATIENT
Start: 2024-08-30

## 2024-09-12 ENCOUNTER — OFFICE VISIT (OUTPATIENT)
Dept: SLEEP CENTER | Age: 69
End: 2024-09-12
Attending: INTERNAL MEDICINE
Payer: MEDICARE

## 2024-09-12 DIAGNOSIS — G47.33 OSA (OBSTRUCTIVE SLEEP APNEA): ICD-10-CM

## 2024-09-12 PROCEDURE — 95811 POLYSOM 6/>YRS CPAP 4/> PARM: CPT

## 2024-09-26 ENCOUNTER — TELEPHONE (OUTPATIENT)
Dept: PULMONOLOGY | Facility: CLINIC | Age: 69
End: 2024-09-26

## 2024-09-26 NOTE — TELEPHONE ENCOUNTER
----- Message from Marques James sent at 9/23/2024  5:20 PM CDT -----  Please inform the patient that her sleep study showed JAY  Please order for the patient CPAP as directed in the study    the patient should be prescribed CPAP at 10 cm H2O, with humidity at 3 and EPR on.   the patient was fitted with a ResMed AirFit N30 mask, size Small.

## 2024-11-03 ENCOUNTER — HOSPITAL ENCOUNTER (OUTPATIENT)
Dept: CT IMAGING | Facility: HOSPITAL | Age: 69
End: 2024-11-03
Attending: INTERNAL MEDICINE
Payer: MEDICARE

## 2024-11-03 ENCOUNTER — HOSPITAL ENCOUNTER (OUTPATIENT)
Dept: CT IMAGING | Facility: HOSPITAL | Age: 69
Discharge: HOME | End: 2024-11-03
Attending: INTERNAL MEDICINE
Payer: MEDICARE

## 2024-11-03 DIAGNOSIS — R91.1 PULMONARY NODULE: ICD-10-CM

## 2024-11-03 PROCEDURE — 71250 CT THORAX DX C-: CPT | Performed by: INTERNAL MEDICINE

## 2024-11-06 ENCOUNTER — OFFICE VISIT (OUTPATIENT)
Dept: PULMONOLOGY | Facility: CLINIC | Age: 69
End: 2024-11-06
Payer: MEDICARE

## 2024-11-06 VITALS
OXYGEN SATURATION: 95 % | BODY MASS INDEX: 49.09 KG/M2 | WEIGHT: 260 LBS | DIASTOLIC BLOOD PRESSURE: 71 MMHG | HEIGHT: 61 IN | HEART RATE: 86 BPM | SYSTOLIC BLOOD PRESSURE: 150 MMHG | RESPIRATION RATE: 14 BRPM

## 2024-11-06 DIAGNOSIS — J44.9 CHRONIC OBSTRUCTIVE PULMONARY DISEASE, UNSPECIFIED COPD TYPE (HCC): Primary | ICD-10-CM

## 2024-11-06 DIAGNOSIS — R91.1 LUNG NODULE: ICD-10-CM

## 2024-11-06 DIAGNOSIS — G47.33 OSA (OBSTRUCTIVE SLEEP APNEA): ICD-10-CM

## 2024-11-06 PROCEDURE — 99215 OFFICE O/P EST HI 40 MIN: CPT | Performed by: INTERNAL MEDICINE

## 2024-11-06 RX ORDER — ALBUTEROL SULFATE 90 UG/1
2 INHALANT RESPIRATORY (INHALATION) EVERY 6 HOURS PRN
Qty: 1 EACH | Refills: 2 | Status: SHIPPED | OUTPATIENT
Start: 2024-11-06

## 2024-11-06 RX ORDER — FLUTICASONE FUROATE AND VILANTEROL 100; 25 UG/1; UG/1
1 POWDER RESPIRATORY (INHALATION) DAILY
Qty: 1 EACH | Refills: 3 | Status: SHIPPED | OUTPATIENT
Start: 2024-11-06

## 2024-11-06 NOTE — PROGRESS NOTES
Order placed for CPAP. Patient wants to wait for order before it is sent to [facility]. Patient stated she might not want to proceed.

## 2024-11-06 NOTE — PROGRESS NOTES
Subjective:   Patient ID: Qing Miranda is a 69 year old female.    HPI  No active cough or sputum or fever  Dyspnea upon exertion in the last 4 to 5 months  No lower extremity edema or calf tenderness or chest pain  Daytime sleepiness and fatigue  Not using any inhalers  Denied wheezing  No fever or chills  History/Other:   Review of Systems   Constitutional:  Positive for fatigue. Negative for fever.   HENT:  Negative for congestion.    Respiratory:  Positive for shortness of breath. Negative for cough and wheezing.    Cardiovascular:  Negative for chest pain.   Gastrointestinal: Negative.    Musculoskeletal: Negative.    Skin: Negative.    Neurological: Negative.    Psychiatric/Behavioral: Negative.       Current Outpatient Medications   Medication Sig Dispense Refill    furosemide 20 MG Oral Tab Take 1 tablet (20 mg total) by mouth daily as needed. 30 tablet 0    Potassium Chloride ER 10 MEQ Oral Tab CR Take 1 tablet (10 mEq total) by mouth daily as needed. 30 tablet 0    fluticasone propionate 50 MCG/ACT Nasal Suspension SHAKE LIQUID AND USE 2 SPRAYS IN EACH NOSTRIL DAILY 1 each 2    cyanocobalamin 1000 MCG/ML Injection Solution INJECT 1 ML INTO THE MUSCLE EVERY 15 DAYS 24 mL 1    Syringe/Needle, Disp, (BD LUER-MARGARITO SYRINGE) 25G X 1\" 3 ML Does not apply Misc USE TWICE A MONTH AS DIRECTED 24 each 2    atorvastatin 20 MG Oral Tab Take 1 tablet (20 mg total) by mouth nightly. 90 tablet 0    Homeopathic Products (IRRITATED EYE RELIEF OP) Apply to eye.       Allergies:Allergies[1]    Objective:   Physical Exam  Constitutional:       General: She is not in acute distress.     Appearance: She is obese. She is not ill-appearing.   HENT:      Head: Atraumatic.      Nose: Nose normal.      Mouth/Throat:      Mouth: Mucous membranes are moist.   Eyes:      General: No scleral icterus.  Cardiovascular:      Rate and Rhythm: Normal rate.      Heart sounds:      No gallop.   Pulmonary:      Effort: No respiratory  distress.      Breath sounds: No stridor. No wheezing, rhonchi or rales.   Abdominal:      General: Abdomen is flat. Bowel sounds are normal. There is no distension.      Palpations: Abdomen is soft.      Tenderness: There is no guarding.   Musculoskeletal:      Cervical back: Normal range of motion.      Right lower leg: No edema.      Left lower leg: No edema.   Skin:     General: Skin is dry.   Neurological:      Mental Status: She is oriented to person, place, and time.             Chest ct 11/3/2024 ;    MEDIASTINUM/VASCULATURE: There are multiple mildly prominent mediastinal lymph nodes which are nonspecific and measure less than 1 cm in short axis.  0.9 x 0.8 cm right supraclavicular lymph node is unchanged.  There is atherosclerotic calcification of  the aortic arch and thoracic aorta. The thoracic aorta is otherwise unremarkable and not dilated. Mediastinal fat planes are preserved. Cardiac chambers are unremarkable. Pericardium is normal. There are coronary artery calcifications. Main pulmonary  artery is dilated measuring up to 3.6 cm which can be seen with pulmonary hypertension.     LUNGS AND PLEURA: Right lower lobe pulmonary nodules are once again seen.  6 mm nodule abutting the right minor fissure (series 3, image 53) is unchanged.  9 mm nodule within the right lower lobe (series 3, image 62) is increased in size.  9 mm nodule  within the right lateral lower lobe (series 3, image 64) is also increased in size.     There is mild amount of subpleural reticulation seen within the periphery of the bilateral upper and lower lobes which is nonspecific.  There is mild interlobular septal thickening also seen within the bilateral upper and lower lobes, most pronounced  within the bilateral lower lobes.  These findings are nonspecific but can be seen with early changes of interstitial lung disease.  There is mosaic attenuation of the bilateral lungs which can be secondary to air trapping/small airways disease  or small  vessel disease. There is mild bronchial wall thickening suggestive of chronic airway inflammation. There is no honeycombing or bronchiectasis. There is bibasilar and lingular atelectasis and scarring.     CHEST WALL/BONES: There is degenerative disease of the thoracic spine. The chest wall and osseous structures are otherwise unremarkable.     LIMITED ABDOMEN: Small hiatal hernia with wall thickening at the gastroesophageal junction. Colonic diverticulosis is partially seen.               Impression   CONCLUSION:     2 separate 9 mm pulmonary nodules within the right lower lobe have increased in size since 7/27/2024.  Follow-up CT chest in 3 months or PET-CT suggested for further evaluation.     Mild subpleural reticulation within the periphery of the bilateral upper and lower lobes with mild interlobular septal thickening within the bilateral lower lobes.  Findings are nonspecific but can seen with early changes of interstitial lung disease.    No bronchiectasis or honeycombing.     Mosaic attenuation of the lungs bilaterally suggestive of air trapping which can be seen with small vessel or small airways disease.     Bronchial wall thickening suggestive of mild chronic airway inflammation.                Assessment & Plan:   1. Chronic obstructive pulmonary disease, unspecified COPD type (HCC)    2. Lung nodule    3. JAY (obstructive sleep apnea)        1-possible COPD   25-pack-year history of smoking quit 1996  visit to ER on 7/27/2024 and chest CT no PE with bronchitis and completed antibiotics     Plan ;  Add ICS/LABA inhaler with Breo 100/25 mcg daily   Albuterol as needed  Baseline PFTs     2-right lower lobe 9 mm   Seen on chest ct 7/27/24  8 mm  Slightly larger on follow-up chest CT on 11/3/2024     Plan :  PET scan     3- severe JAY with AHI of 75 required CPAP 10 CWP  BMI 50  Upper airway class III Mallampati score  ESS 18     Start CPAP therapy / order was placed   Diet and exercise and weight  reduction  Avoid driving if sleepy  Avoid sedative and narcotic and alcohol    4-dyspnea upon exertion for the last few months   CT in July with no PEs  Follow-up CT on 11/3/2024 nonspecific for ILD, mosaic attenuation with air trapping?  Airway disease  Will add Breo and prn albuterol   PFTs  Treat sleep apnea  Re-evaluate in 3 months     40 min with pt and reviewing records          Follow-up in 3 months          Meds This Visit:  Requested Prescriptions      No prescriptions requested or ordered in this encounter       Imaging & Referrals:  None         [1]   Allergies  Allergen Reactions    Ciprofloxacin HIVES

## 2024-11-07 ENCOUNTER — NURSE TRIAGE (OUTPATIENT)
Dept: INTERNAL MEDICINE CLINIC | Facility: CLINIC | Age: 69
End: 2024-11-07

## 2024-11-07 ENCOUNTER — OFFICE VISIT (OUTPATIENT)
Dept: INTERNAL MEDICINE CLINIC | Facility: CLINIC | Age: 69
End: 2024-11-07
Payer: MEDICARE

## 2024-11-07 VITALS
SYSTOLIC BLOOD PRESSURE: 158 MMHG | DIASTOLIC BLOOD PRESSURE: 50 MMHG | BODY MASS INDEX: 49.18 KG/M2 | HEIGHT: 61 IN | OXYGEN SATURATION: 93 % | WEIGHT: 260.5 LBS | TEMPERATURE: 100 F

## 2024-11-07 DIAGNOSIS — K64.9 HEMORRHOIDS, UNSPECIFIED HEMORRHOID TYPE: ICD-10-CM

## 2024-11-07 DIAGNOSIS — I25.10 ATHEROSCLEROSIS OF NATIVE CORONARY ARTERY OF NATIVE HEART WITHOUT ANGINA PECTORIS: ICD-10-CM

## 2024-11-07 DIAGNOSIS — R91.8 LUNG NODULES: ICD-10-CM

## 2024-11-07 DIAGNOSIS — I10 PRIMARY HYPERTENSION: Primary | ICD-10-CM

## 2024-11-07 PROCEDURE — 99214 OFFICE O/P EST MOD 30 MIN: CPT | Performed by: INTERNAL MEDICINE

## 2024-11-07 RX ORDER — IRBESARTAN AND HYDROCHLOROTHIAZIDE 150; 12.5 MG/1; MG/1
1 TABLET, FILM COATED ORAL DAILY
Qty: 90 TABLET | Refills: 1 | Status: SHIPPED | OUTPATIENT
Start: 2024-11-07

## 2024-11-07 NOTE — TELEPHONE ENCOUNTER
Action Requested: Summary for Provider     []  Critical Lab, Recommendations Needed  [x] Need Additional Advice  []   FYI    []   Need Orders  [] Need Medications Sent to Pharmacy  []  Other     Dr Shore, please advise    Patient is asking if you can see her today or tomorrow after 1pm?    SUMMARY: Per Protocol disposition advised to be seen in the office to address multiple concerns, blood pressure, intermittent nose bleeds and possible urinary symptoms.  Patient wants to see PCP only. Message sent.    Reason for call: Acute and Blood Pressure  Onset: over one week    Patient (name and  verified) calling with an increase in blood pressure and increase in nose bleeds. Patient states that her blood pressure usually elevates when she has infection. Patient is concerned that she has a urinary tract infection again. Patient denies any symptoms at this time. Denies any fever.     Patient does not check her blood pressure daily. States that her blood pressure was elevated at the pulmonologist office.   Blood pressure yesterday  150/71   170/67    Reason for Disposition   Patient wants to be seen    Protocols used: Blood Pressure - High-A-OH

## 2024-11-07 NOTE — PROGRESS NOTES
Subjective:     Patient ID: Qing Miranda is a 69 year old female.    Blood Pressure  Chronicity: bp had been elevated for few days. The current episode started in the past 7 days. The problem occurs constantly. The problem has been unchanged. Pertinent negatives include no chest pain, headaches, urinary symptoms or vomiting. Nothing aggravates the symptoms. She has tried nothing (was on avalide prn before but has ran out) for the symptoms. The treatment provided no relief.   Hemorrhoids  This is a new problem. The current episode started in the past 7 days. The problem occurs constantly. The problem has been gradually improving. Pertinent negatives include no chest pain, headaches, urinary symptoms or vomiting. Nothing aggravates the symptoms. She has tried nothing for the symptoms. The treatment provided mild relief.       History/Other:   Review of Systems   Constitutional: Negative.    Respiratory:  Positive for shortness of breath. Negative for wheezing.    Cardiovascular: Negative.  Negative for chest pain, palpitations and leg swelling.        Occasional bilateral ankle edema for which she takes her lasix prn   Gastrointestinal:  Positive for anal bleeding. Negative for constipation, diarrhea and vomiting.   Genitourinary: Negative.    Neurological:  Negative for headaches.     Current Outpatient Medications   Medication Sig Dispense Refill    albuterol 108 (90 Base) MCG/ACT Inhalation Aero Soln Inhale 2 puffs into the lungs every 6 (six) hours as needed. inhale 2 puff by inhalation route  every 4 - 6 hours as needed 1 each 2    furosemide 20 MG Oral Tab Take 1 tablet (20 mg total) by mouth daily as needed. 30 tablet 0    Potassium Chloride ER 10 MEQ Oral Tab CR Take 1 tablet (10 mEq total) by mouth daily as needed. 30 tablet 0    fluticasone propionate 50 MCG/ACT Nasal Suspension SHAKE LIQUID AND USE 2 SPRAYS IN EACH NOSTRIL DAILY 1 each 2    cyanocobalamin 1000 MCG/ML Injection Solution INJECT 1  ML INTO THE MUSCLE EVERY 15 DAYS 24 mL 1    Syringe/Needle, Disp, (BD LUER-MARGARITO SYRINGE) 25G X 1\" 3 ML Does not apply Misc USE TWICE A MONTH AS DIRECTED 24 each 2    atorvastatin 20 MG Oral Tab Take 1 tablet (20 mg total) by mouth nightly. 90 tablet 0    Homeopathic Products (IRRITATED EYE RELIEF OP) Apply to eye.      fluticasone furoate-vilanterol (BREO ELLIPTA) 100-25 MCG/ACT Inhalation Aerosol Powder, Breath Activated Inhale 1 puff into the lungs daily. Rinse your mouth with water without swallowing after using BREO to help reduce your chance of getting thrush. (Patient not taking: Reported on 2024) 1 each 3     Allergies:Allergies[1]    Past Medical History:    Diverticulosis    Frequent UTI    Hemorrhoids    High cholesterol    HTN (hypertension)    Hyperlipidemia    Hypothyroidism    subclinical hypothyroidism    Macular degeneration    Morbid obesity with BMI of 50.0-59.9, adult (HCC)    Prediabetes    Pregnancy (HCC)    Per [employer]:  \" 2, Para 1, Miscarriage, 1 .\"    Supraventricular tachycardia (HCC)    Management:  AV node ablation     Vitamin B12 deficiency      Past Surgical History:   Procedure Laterality Date    Ablation      AV node ablation          Cholecystectomy      Colonoscopy N/A 2017    Procedure: COLONOSCOPY;  Surgeon: Vicente Bryant MD;  Location: [facility] ENDOSCOPY    Hemorrhoidectomy      Other surgical history      laparotomy- adhesiolysis of bowel      Family History   Problem Relation Age of Onset    Other (Other) Father         copd    Heart Disorder Father     Melanoma Father     Cancer Father         Basal Cell Cancer    Diabetes Mother     Heart Disorder Mother         Congestive Heart Failure    Renal Disease Mother     Other (Other) Mother     Ovarian Cancer Maternal Grandmother     Stroke Sister     Diabetes Sister     Obesity Sister     Other (Other) Brother         cirrhosis liver    Other (Other) Other         No Family h    Other  (Other) Other         No Family h      Social History:   Social History     Socioeconomic History    Marital status:    Tobacco Use    Smoking status: Former     Current packs/day: 0.00     Average packs/day: 1 pack/day for 30.0 years (30.0 ttl pk-yrs)     Types: Cigarettes     Start date: 1966     Quit date: 1996     Years since quittin.8     Passive exposure: Past    Smokeless tobacco: Never    Tobacco comments:     Quit at 40 y/o   Vaping Use    Vaping status: Never Used   Substance and Sexual Activity    Alcohol use: Yes     Alcohol/week: 0.0 standard drinks of alcohol     Comment: socially    Drug use: No   Other Topics Concern    Caffeine Concern Yes     Comment: Coffee, 2 cups per week     Exercise No     Social Drivers of Health     Financial Resource Strain: Low Risk  (2024)    Financial Resource Strain     Med Affordability: No   Food Insecurity: No Food Insecurity (2024)    Food Insecurity     Food Insecurity: Never true   Transportation Needs: No Transportation Needs (2024)    Transportation Needs     Lack of Transportation: No   Housing Stability: Low Risk  (2024)    Housing Stability     Housing Instability: No        Objective:   Physical Exam  Constitutional:       General: She is not in acute distress.     Appearance: She is obese. She is not ill-appearing, toxic-appearing or diaphoretic.   HENT:      Right Ear: External ear normal.      Left Ear: External ear normal.   Cardiovascular:      Rate and Rhythm: Normal rate and regular rhythm.      Heart sounds: Normal heart sounds. No murmur heard.     No gallop.   Pulmonary:      Effort: Pulmonary effort is normal. No respiratory distress.      Breath sounds: Normal breath sounds. No wheezing or rales.   Abdominal:      General: Bowel sounds are normal. There is no distension.      Palpations: Abdomen is soft. There is no mass.      Tenderness: There is no abdominal tenderness. There is no guarding.    Musculoskeletal:      Cervical back: Normal range of motion and neck supple. No rigidity or tenderness.      Right lower leg: No edema.      Left lower leg: No edema.   Lymphadenopathy:      Cervical: No cervical adenopathy.   Skin:     Coloration: Skin is not jaundiced or pale.      Findings: No rash.   Neurological:      Mental Status: She is alert.         Assessment & Plan:   (I10) Primary hypertension  (primary encounter diagnosis)  Plan: Urinalysis with Culture Reflex        Bp had been elevated and pt has ran out of her bp meds that she was taking prn before, I referred her avalide and told her to stop her lasix.  Monitor bp and call back if remains elevated.     (R91.8) Lung nodules  Plan: pt had ct chest and showed lung nodules increased in size, pt was seen by DR James who ordered petscan.   Pt also diagnosed with possible CoPD per Dr James and ordered PFT, started on inhalers.     (I25.10) Atherosclerosis of native coronary artery of native heart without angina pectoris  Plan: pt recent ct again showed coronary calcifictions; she had been advised before to do stress test and 2d echo but hasn't done it yet. I told her these tests needs to be done and she will make apptment.     (K64.9) Hemorrhoids, unspecified hemorrhoid type  Plan: pt complained of having bleeding hemorrhoids though had improved; pt states she is current with her colonoscopy. I had recommended to see surgeon for eval but she declined for now, she is going for several tests mentioned above and wants to take care of them first. Pt told to call back if she continues to have bleeding.        No orders of the defined types were placed in this encounter.      Meds This Visit:  Requested Prescriptions      No prescriptions requested or ordered in this encounter       Imaging & Referrals:  None            [1]   Allergies  Allergen Reactions    Ciprofloxacin HIVES

## 2024-11-07 NOTE — TELEPHONE ENCOUNTER
Called patient in regards to message below ( identified name and date of birth )     Patient agreeable and thankful to be seen     Appointment made     Future Appointments   Date Time Provider Department Center   11/7/2024  4:45 PM Christos Shore MD [facility]   11/15/2024  8:15 AM [facility] PFT ROOM [facility] RT EM [facility]   11/15/2024  9:45 AM [facility] PET DOSE RM [facility] PET SCAN EM [facility]   11/15/2024 11:00 AM [facility] PET RM1 [facility] PET SCAN EM [facility]

## 2024-11-08 ENCOUNTER — LAB ENCOUNTER (OUTPATIENT)
Dept: LAB | Age: 69
End: 2024-11-08
Attending: INTERNAL MEDICINE
Payer: MEDICARE

## 2024-11-08 ENCOUNTER — HOSPITAL ENCOUNTER (INPATIENT)
Facility: HOSPITAL | Age: 69
LOS: 2 days | Discharge: HOME | End: 2024-11-10
Attending: STUDENT IN AN ORGANIZED HEALTH CARE EDUCATION/TRAINING PROGRAM | Admitting: HOSPITALIST
Payer: MEDICARE

## 2024-11-08 ENCOUNTER — APPOINTMENT (OUTPATIENT)
Dept: INTERVENTIONAL RADIOLOGY/VASCULAR | Facility: HOSPITAL | Age: 69
End: 2024-11-08
Attending: NURSE PRACTITIONER
Payer: MEDICARE

## 2024-11-08 ENCOUNTER — APPOINTMENT (OUTPATIENT)
Dept: GENERAL RADIOLOGY | Facility: HOSPITAL | Age: 69
End: 2024-11-08
Attending: STUDENT IN AN ORGANIZED HEALTH CARE EDUCATION/TRAINING PROGRAM
Payer: MEDICARE

## 2024-11-08 DIAGNOSIS — I10 PRIMARY HYPERTENSION: ICD-10-CM

## 2024-11-08 DIAGNOSIS — I44.2 COMPLETE HEART BLOCK (HCC): Primary | ICD-10-CM

## 2024-11-08 PROBLEM — I44.30 AV HEART BLOCK: Status: ACTIVE | Noted: 2024-11-08

## 2024-11-08 PROBLEM — I45.9 HEART BLOCK: Status: ACTIVE | Noted: 2024-11-08

## 2024-11-08 LAB
ANION GAP SERPL CALC-SCNC: 10 MMOL/L (ref 0–18)
APTT PPP: 30.5 SECONDS (ref 23–36)
ATRIAL RATE: 28 BPM
BASOPHILS # BLD AUTO: 0.03 X10(3) UL (ref 0–0.2)
BASOPHILS NFR BLD AUTO: 0.3 %
BILIRUB UR QL: NEGATIVE
BUN BLD-MCNC: 23 MG/DL (ref 9–23)
BUN/CREAT SERPL: 22.1 (ref 10–20)
CALCIUM BLD-MCNC: 9.8 MG/DL (ref 8.7–10.4)
CHLORIDE SERPL-SCNC: 104 MMOL/L (ref 98–112)
CO2 SERPL-SCNC: 23 MMOL/L (ref 21–32)
CREAT BLD-MCNC: 1.04 MG/DL
DEPRECATED RDW RBC AUTO: 44.9 FL (ref 35.1–46.3)
EGFRCR SERPLBLD CKD-EPI 2021: 59 ML/MIN/1.73M2 (ref 60–?)
EOSINOPHIL # BLD AUTO: 0.11 X10(3) UL (ref 0–0.7)
EOSINOPHIL NFR BLD AUTO: 1 %
ERYTHROCYTE [DISTWIDTH] IN BLOOD BY AUTOMATED COUNT: 14 % (ref 11–15)
GLUCOSE BLD-MCNC: 112 MG/DL (ref 70–99)
GLUCOSE UR-MCNC: NORMAL MG/DL
HCT VFR BLD AUTO: 39.5 %
HGB BLD-MCNC: 13 G/DL
IMM GRANULOCYTES # BLD AUTO: 0.05 X10(3) UL (ref 0–1)
IMM GRANULOCYTES NFR BLD: 0.4 %
INR BLD: 0.97 (ref 0.8–1.2)
KETONES UR-MCNC: NEGATIVE MG/DL
LEUKOCYTE ESTERASE UR QL STRIP.AUTO: 500
LYMPHOCYTES # BLD AUTO: 2.71 X10(3) UL (ref 1–4)
LYMPHOCYTES NFR BLD AUTO: 23.6 %
MAGNESIUM SERPL-MCNC: 2.1 MG/DL (ref 1.6–2.6)
MCH RBC QN AUTO: 28.6 PG (ref 26–34)
MCHC RBC AUTO-ENTMCNC: 32.9 G/DL (ref 31–37)
MCV RBC AUTO: 87 FL
MONOCYTES # BLD AUTO: 0.65 X10(3) UL (ref 0.1–1)
MONOCYTES NFR BLD AUTO: 5.7 %
NEUTROPHILS # BLD AUTO: 7.94 X10 (3) UL (ref 1.5–7.7)
NEUTROPHILS # BLD AUTO: 7.94 X10(3) UL (ref 1.5–7.7)
NEUTROPHILS NFR BLD AUTO: 69 %
NITRITE UR QL STRIP.AUTO: NEGATIVE
OSMOLALITY SERPL CALC.SUM OF ELEC: 288 MOSM/KG (ref 275–295)
P AXIS: 47 DEGREES
P-R INTERVAL: 246 MS
PH UR: 5 [PH] (ref 5–8)
PLATELET # BLD AUTO: 344 10(3)UL (ref 150–450)
POTASSIUM SERPL-SCNC: 3.8 MMOL/L (ref 3.5–5.1)
PROT UR-MCNC: NEGATIVE MG/DL
PROTHROMBIN TIME: 13.5 SECONDS (ref 11.6–14.8)
Q-T INTERVAL: 650 MS
QRS DURATION: 132 MS
QTC CALCULATION (BEZET): 443 MS
R AXIS: 52 DEGREES
RBC # BLD AUTO: 4.54 X10(6)UL
SODIUM SERPL-SCNC: 137 MMOL/L (ref 136–145)
SP GR UR STRIP: 1.01 (ref 1–1.03)
T AXIS: 38 DEGREES
T4 FREE SERPL-MCNC: 1.2 NG/DL (ref 0.8–1.7)
TROPONIN I SERPL HS-MCNC: 20 NG/L
TSI SER-ACNC: 8.39 UIU/ML (ref 0.55–4.78)
UROBILINOGEN UR STRIP-ACNC: NORMAL
VENTRICULAR RATE: 28 BPM
WBC # BLD AUTO: 11.5 X10(3) UL (ref 4–11)
WBC #/AREA URNS AUTO: >50 /HPF

## 2024-11-08 PROCEDURE — 99223 1ST HOSP IP/OBS HIGH 75: CPT | Performed by: HOSPITALIST

## 2024-11-08 PROCEDURE — 71045 X-RAY EXAM CHEST 1 VIEW: CPT | Performed by: STUDENT IN AN ORGANIZED HEALTH CARE EDUCATION/TRAINING PROGRAM

## 2024-11-08 PROCEDURE — 02H63JZ INSERTION OF PACEMAKER LEAD INTO RIGHT ATRIUM, PERCUTANEOUS APPROACH: ICD-10-PCS | Performed by: INTERNAL MEDICINE

## 2024-11-08 PROCEDURE — 87086 URINE CULTURE/COLONY COUNT: CPT

## 2024-11-08 PROCEDURE — 02HK3JZ INSERTION OF PACEMAKER LEAD INTO RIGHT VENTRICLE, PERCUTANEOUS APPROACH: ICD-10-PCS | Performed by: INTERNAL MEDICINE

## 2024-11-08 PROCEDURE — 0JH606Z INSERTION OF PACEMAKER, DUAL CHAMBER INTO CHEST SUBCUTANEOUS TISSUE AND FASCIA, OPEN APPROACH: ICD-10-PCS | Performed by: INTERNAL MEDICINE

## 2024-11-08 PROCEDURE — 81001 URINALYSIS AUTO W/SCOPE: CPT

## 2024-11-08 PROCEDURE — B51N1ZZ FLUOROSCOPY OF LEFT UPPER EXTREMITY VEINS USING LOW OSMOLAR CONTRAST: ICD-10-PCS | Performed by: INTERNAL MEDICINE

## 2024-11-08 PROCEDURE — 4B02XSZ MEASUREMENT OF CARDIAC PACEMAKER, EXTERNAL APPROACH: ICD-10-PCS | Performed by: INTERNAL MEDICINE

## 2024-11-08 RX ORDER — ACETAMINOPHEN AND CODEINE PHOSPHATE 300; 30 MG/1; MG/1
1 TABLET ORAL EVERY 4 HOURS PRN
Status: DISCONTINUED | OUTPATIENT
Start: 2024-11-08 | End: 2024-11-10

## 2024-11-08 RX ORDER — METOPROLOL TARTRATE 1 MG/ML
5 INJECTION, SOLUTION INTRAVENOUS ONCE
Status: COMPLETED | OUTPATIENT
Start: 2024-11-08 | End: 2024-11-08

## 2024-11-08 RX ORDER — MIDAZOLAM HYDROCHLORIDE 1 MG/ML
INJECTION INTRAMUSCULAR; INTRAVENOUS
Status: COMPLETED
Start: 2024-11-08 | End: 2024-11-08

## 2024-11-08 RX ORDER — ACETAMINOPHEN AND CODEINE PHOSPHATE 300; 30 MG/1; MG/1
2 TABLET ORAL EVERY 4 HOURS PRN
Status: DISCONTINUED | OUTPATIENT
Start: 2024-11-08 | End: 2024-11-10

## 2024-11-08 RX ORDER — ACETAMINOPHEN 325 MG/1
650 TABLET ORAL EVERY 4 HOURS PRN
Status: DISCONTINUED | OUTPATIENT
Start: 2024-11-08 | End: 2024-11-10

## 2024-11-08 RX ORDER — ONDANSETRON 2 MG/ML
4 INJECTION INTRAMUSCULAR; INTRAVENOUS EVERY 6 HOURS PRN
Status: DISCONTINUED | OUTPATIENT
Start: 2024-11-08 | End: 2024-11-10

## 2024-11-08 RX ORDER — TEMAZEPAM 15 MG/1
15 CAPSULE ORAL NIGHTLY PRN
Status: DISCONTINUED | OUTPATIENT
Start: 2024-11-08 | End: 2024-11-10

## 2024-11-08 RX ORDER — ACETAMINOPHEN 500 MG
500 TABLET ORAL EVERY 4 HOURS PRN
Status: DISCONTINUED | OUTPATIENT
Start: 2024-11-08 | End: 2024-11-10

## 2024-11-08 RX ORDER — METOCLOPRAMIDE HYDROCHLORIDE 5 MG/ML
10 INJECTION INTRAMUSCULAR; INTRAVENOUS EVERY 8 HOURS PRN
Status: DISCONTINUED | OUTPATIENT
Start: 2024-11-08 | End: 2024-11-10

## 2024-11-08 RX ORDER — LIDOCAINE HYDROCHLORIDE AND EPINEPHRINE 10; 10 MG/ML; UG/ML
INJECTION, SOLUTION INFILTRATION; PERINEURAL
Status: COMPLETED
Start: 2024-11-08 | End: 2024-11-08

## 2024-11-08 RX ORDER — CHLORHEXIDINE GLUCONATE 40 MG/ML
SOLUTION TOPICAL
Status: ACTIVE | OUTPATIENT
Start: 2024-11-09 | End: 2024-11-09

## 2024-11-08 RX ORDER — SODIUM CHLORIDE 9 MG/ML
INJECTION, SOLUTION INTRAVENOUS
Status: ACTIVE | OUTPATIENT
Start: 2024-11-09 | End: 2024-11-09

## 2024-11-08 RX ORDER — IOPAMIDOL 612 MG/ML
100 INJECTION, SOLUTION INTRAVASCULAR
Status: COMPLETED | OUTPATIENT
Start: 2024-11-08 | End: 2024-11-08

## 2024-11-08 NOTE — ED PROVIDER NOTES
[facility] Department Note  Patient: Qing Miranda Age: 69 year old Sex: female      MRN: [id number]  : 1955    Patient Seen in: [facility] Emergency Department    History     Chief Complaint   Patient presents with    Chest Pain Angina    Difficulty Breathing     Stated Complaint: Chest Pain, SOB    History obtained from: Patient    Patient is a 69-year-old female the past medical history of hypertension, hyperlipidemia, hypothyroidism, prediabetes, obesity, SVT status post AV node ablation presenting for evaluation of chest pain and shortness of breath over the past 6 days.  Patient states that she has been having generalized fatigue and weakness over the past several months.  States that symptoms acutely worsened over the past 6 days.  Has been being seen by her PCP for the symptoms.  Had an appointment yesterday during which she was started on irbesartan-hydrochlorothiazide.  States that she took that for the first time today.  Denies any other blood pressure medication use.    Review of Systems:  Review of Systems  Positive for stated complaint: Chest Pain, SOB. Constitutional and vital signs reviewed. All other systems reviewed and negative except as noted above.    Patient History:  Past Medical History:    Diverticulosis    Frequent UTI    Hemorrhoids    High cholesterol    HTN (hypertension)    Hyperlipidemia    Hypothyroidism    subclinical hypothyroidism    Macular degeneration    Morbid obesity with BMI of 50.0-59.9, adult (HCC)    Prediabetes    Pregnancy (HCC)    Per NextGen:  \" 2, Para 1, Miscarriage, 1 .\"    Supraventricular tachycardia (HCC)    Management:  AV node ablation     Vitamin B12 deficiency       Past Surgical History:   Procedure Laterality Date    Ablation      AV node ablation          Cholecystectomy      Colonoscopy N/A 2017    Procedure: COLONOSCOPY;  Surgeon: Vicente Bryant MD;  Location: [facility] ENDOSCOPY     Hemorrhoidectomy      Other surgical history      laparotomy- adhesiolysis of bowel        Family History   Problem Relation Age of Onset    Other (Other) Father         copd    Heart Disorder Father     Melanoma Father     Cancer Father         Basal Cell Cancer    Diabetes Mother     Heart Disorder Mother         Congestive Heart Failure    Renal Disease Mother     Other (Other) Mother     Ovarian Cancer Maternal Grandmother     Stroke Sister     Diabetes Sister     Obesity Sister     Other (Other) Brother         cirrhosis liver    Other (Other) Other         No Family h    Other (Other) Other         No Family h       Specific Social Determinants of Health:   Social History     Socioeconomic History    Marital status:    Tobacco Use    Smoking status: Former     Current packs/day: 0.00     Average packs/day: 1 pack/day for 30.0 years (30.0 ttl pk-yrs)     Types: Cigarettes     Start date: 1966     Quit date: 1996     Years since quittin.8     Passive exposure: Past    Smokeless tobacco: Never    Tobacco comments:     Quit at 40 y/o   Vaping Use    Vaping status: Never Used   Substance and Sexual Activity    Alcohol use: Yes     Alcohol/week: 0.0 standard drinks of alcohol     Comment: socially    Drug use: No   Other Topics Concern    Caffeine Concern Yes     Comment: Coffee, 2 cups per week     Exercise No     Social Drivers of Health     Financial Resource Strain: Low Risk  (2024)    Financial Resource Strain     Med Affordability: No   Food Insecurity: No Food Insecurity (2024)    Food Insecurity     Food Insecurity: Never true   Transportation Needs: No Transportation Needs (2024)    Transportation Needs     Lack of Transportation: No   Housing Stability: Low Risk  (2024)    Housing Stability     Housing Instability: No           PSFH elements reviewed from today and agreed except as otherwise stated in HPI.    Physical Exam     ED Triage Vitals   BP 24 1611 (S)  (!) 165/55   Pulse 11/08/24 1602 (!) 31   Resp 11/08/24 1558 20   Temp 11/08/24 1558 97.4 °F (36.3 °C)   Temp src 11/08/24 1558 Temporal   SpO2 11/08/24 1602 91 %   O2 Device 11/08/24 1602 None (Room air)       Current:BP (S) (!) 165/55   Pulse (!) 29   Temp 97.4 °F (36.3 °C) (Temporal)   Resp 21   Ht 154.9 cm (5' 1\")   Wt 117.9 kg   SpO2 95%   BMI 49.13 kg/m²         Physical Exam  Constitutional:       General: She is not in acute distress.  HENT:      Head: Normocephalic and atraumatic.      Mouth/Throat:      Mouth: Mucous membranes are moist.   Eyes:      Extraocular Movements: Extraocular movements intact.   Cardiovascular:      Rate and Rhythm: Regular rhythm. Bradycardia present.      Heart sounds: Normal heart sounds.   Pulmonary:      Effort: Pulmonary effort is normal. No respiratory distress.      Breath sounds: Normal breath sounds.   Abdominal:      Palpations: Abdomen is soft.      Tenderness: There is no abdominal tenderness.   Skin:     General: Skin is warm and dry.      Capillary Refill: Capillary refill takes less than 2 seconds.      Findings: No rash.   Neurological:      General: No focal deficit present.      Mental Status: She is alert and oriented to person, place, and time.   Psychiatric:         Mood and Affect: Mood normal.         Behavior: Behavior normal.         ED Course   Labs:   Labs Reviewed   CBC WITH DIFFERENTIAL WITH PLATELET - Abnormal; Notable for the following components:       Result Value    WBC 11.5 (*)     Neutrophil Absolute Prelim 7.94 (*)     Neutrophil Absolute 7.94 (*)     All other components within normal limits   PROTHROMBIN TIME (PT) - Normal   PTT, ACTIVATED - Normal   BASIC METABOLIC PANEL (8)   TROPONIN I HIGH SENSITIVITY   MAGNESIUM   MRSA SCREEN BY PCR     Radiology findings:  I personally reviewed the images.   XR CHEST AP PORTABLE  (CPT=71045)    Result Date: 11/8/2024  CONCLUSION:  1. Cardiomegaly with mild pulmonary venous hypertension.  No  edema or pneumonia.     Dictated by (CST): Henok Avila MD on 11/08/2024 at 5:05 PM     Finalized by (CST): Henok Avila MD on 11/08/2024 at 5:07 PM           EKG as interpreted by me: Ventricular rate 28, bradycardia with third-degree AV block, widened QRS consistent with right bundle branch block, QTc 443 ms, no ST segment changes, no abnormal T wave inversions  Cardiac Monitor: Interpreted by me.   Pulse Readings from Last 1 Encounters:   11/08/24 (!) 29   , AV fransico,     External non-ED records reviewed independently by me: Echocardiogram from 8/15/2016 with ejection fraction of 55 to 60% with no regional wall motion abnormalities, mild mitral valve regurgitation    MDM   69-year-old female the past medical history of hypertension, hyperlipidemia, hypothyroidism, prediabetes, obesity, SVT status post AV node ablation presenting for evaluation of chest pain shortness of breath over the past 6 days.  Upon arrival emergency department, patient was significantly bradycardic with heart rate of 26.  Otherwise hemodynamically stable.  Mentating well.    Differential diagnoses considered includes, but is not limited to: Third-degree heart block, secondary heart block, electrolyte or metabolic derangement, ACS    Will obtain the following tests: CBC, BMP, troponin, coags, magnesium, EKG, chest x-ray  Please see ED course for my independent review of these tests/imaging results.    Initial Medications/Therapeutics administered: None    Chronic conditions affecting care: Hypertension, hyperlipidemia, hypothyroidism, prediabetes, obesity, SVT status post AV node ablation    Workup and medications considered but not ordered: Considered atropine however low likelihood of benefit given complete heart block    Social Determinants of Health that impacted care: None    ED Course as of 11/08/24 1720  ------------------------------------------------------------  Time: 11/08 1717  Comment: I discussed patient's case with the  [facility] hospitalist, Dr. Garcia, who accepted patient for PCU.  Also discussed with cardiology who is made aware of new consult need for pacemaker placement.  ------------------------------------------------------------  Time: 11/08 1718  Comment: Independently reviewed the chest x-ray images that show no evidence of pneumothorax.  Agree with radiology read above.  ------------------------------------------------------------  Time: 11/08 1720  Comment: I spoke with Dr. Newton, cardiology, who is planning for emergent pacemaker placement.            Critical Care:  I spent a total of 40 minutes of critical care time in obtaining history, performing a physical exam, bedside monitoring of interventions, collecting and interpreting tests and discussion with consultants but not including time spent performing procedures.      Disposition and Plan     Clinical Impression:  1. Complete heart block (HCC)        Disposition:  Admit    Follow-up:  No follow-up provider specified.    Medications Prescribed:  Current Discharge Medication List          Hospital Problems       Present on Admission  Date Reviewed: 11/8/2024            ICD-10-CM Noted POA    * (Principal) Heart block I45.9 11/8/2024 Unknown        This note may have been created using voice dictation technology and may include inadvertent errors.      Rachel White MD  Emergency Medicine

## 2024-11-08 NOTE — H&P
[facility]    PATIENT'S NAME: TADEO TOLBERT   ATTENDING PHYSICIAN: Rachel White MD   PATIENT ACCOUNT#:   [id number]    LOCATION:  [address]  MEDICAL RECORD #:   [id number]       YOB: 1955  ADMISSION DATE:       2024    HISTORY AND PHYSICAL EXAMINATION    CHIEF COMPLAINT:  Complete heart block.    HISTORY OF PRESENT ILLNESS:  The patient is a 69-year-old  female who came into the emergency department for evaluation of progressive shortness of breath, dizziness, and fatigue for last week.  EKG showed profound bradycardia, upper 20s, lower 30s with complete heart block.  CBC showed white blood cell count of 11.5 with left shift.  Chemistry, TSH, troponin, magnesium still pending.  Chest x-ray showed cardiomegaly with mild pulmonary venous hypertension.  No edema or pneumonia.  Patient was evaluated by Cardiology, and she will be admitted to the hospital for further management.    PAST MEDICAL HISTORY:  Morbid obesity, obstructive sleep apnea, chronic obstructive pulmonary disease, hypothyroidism, hypertension, hyperlipidemia, diverticulosis.    PAST SURGICAL HISTORY:  Supraventricular tachycardia ablation, , cholecystectomy, hemorrhoidectomy, and laparoscopy and lysis of adhesions.    MEDICATIONS:  Please see medication reconciliation list.     ALLERGIES:  Ciprofloxacin.    FAMILY HISTORY:  Mother had diabetes mellitus type 2 and heart failure.  Father had COPD and heart disease.    SOCIAL HISTORY:  Ex-tobacco user.  Social alcohol.  No drug use.  Lives with her family.  Independent for basic activities of daily living.     REVIEW OF SYSTEMS:  Patient reports progressive fatigue for last several days associated with lightheadedness and decreased energy.  She denies any chest pain.  She feels dyspneic on exertion.  Other 12-point review of systems is negative.       PHYSICAL EXAMINATION:    GENERAL:  Alert and oriented to time, place, and person.  Mild  distress, appears fatigued.  VITAL SIGNS:  Temperature 97.4, pulse 29, respiratory rate 21, blood pressure 165/55, pulse ox 95% on room air.    HEENT:  Atraumatic.  Oropharynx clear.  Dry mucous membranes.  Normal hard and soft palate.  Eyes:  Anicteric sclerae.   NECK:  Supple.  No lymphadenopathy.  Trachea midline.  LUNGS:  Clear to auscultation bilaterally.  Normal respiratory effort.  Diminished breathing sounds both lung bases.  HEART:  Bradycardic.  Monitor showed complete heart block.  Rate 28.  ABDOMEN:  Soft, nondistended.  No tenderness.  Positive bowel sounds.   EXTREMITIES:  Nonpitting lymphedema.  NEUROLOGIC:  Motor and sensory intact.    ASSESSMENT:    1.   Complete heart block.  2.   Morbid obesity.  3.   Essential hypertension.  4.   Hypothyroidism.    PLAN:  Patient will be admitted to telemetry floor.  Complete bed rest.  Cardiology to evaluate patient for pacemaker placement.  Continue to monitor her rhythm and hemodynamic status.  Further recommendations to follow.     Dictated By Niki Garcia MD  d: 11/08/2024 17:40:53  t: 11/08/2024 17:43:31  Job [id number]  FB/    cc: Rachel White MD

## 2024-11-08 NOTE — ED INITIAL ASSESSMENT (HPI)
Pt presents to the ER with c/o CP and RADHA x 6 days.  Denies pain radiating. C/o shortness of breath and dizziness when ambulating    Also c/o ringing in ears and fatigue for 4 days.

## 2024-11-08 NOTE — CONSULTS
[facility]                                                CARDIAC EP CONSULT NOTE      Patient Name: Qing Miranda MRN: [id number]   : 1955 CSN: [id number]     CARDIAC EP CONSULT NOTE    Reason for consultation:   Asked by Rachel Chavarria MD to provide evaluation and management of bradycardia.    Assessment and Recommendations:     3rd degree AV block  -Symptomatic  -No reversible causes  -I recommend a dual-chamber pacemaker.  Goals, alts, risks were reviewed in detail with the pt and her granddaughter.  She has a selective DNAR order, and we reviewed that during surgery if intubation or ACLS resuscitation is required, she accepts all necessary steps be taken to save her life.  All questions answered.      Thank you for the EP consultation.    Lloyd Newton MD  Cardiac Electrophysiology  [facility]  2024  C5-EP      History of present illness:   The patient is a 69 year old with dyspnea on exertion, abruptly worsened a week ago, and progressively worsening since then.  She has no lightheadedness or syncope.    ROS:   Constitutional: No fevers, chills, fatigue or night sweats.  ENT: No mouth pain, neck pain, running nose, headaches or swollen glands.  Skin: No rashes, pruritus or skin changes,  Respiratory: No cough, wheezing, + shortness of breath.  CV: No chest pain or claudication.  Musculoskeletal: No joint pain, stiffness or swelling.  : No dysuria or hematuria.  GI: No nausea, vomiting or diarrhea. No blood in stools.  Neurologic: No seizures, tremors, weakness or numbness.    Past Medical, Surgical, Family, and Social Histories:     Past Medical History:    Diverticulosis    Frequent UTI    Hemorrhoids    High cholesterol    HTN (hypertension)    Hyperlipidemia    Hypothyroidism    subclinical hypothyroidism    Macular degeneration    Morbid obesity with BMI of  50.0-59.9, adult (HCC)    Prediabetes    Pregnancy (HCC)    Per NextGen:  \" 2, Para 1, Miscarriage, 1 .\"    Supraventricular tachycardia (HCC)    Management:  AV node ablation     Vitamin B12 deficiency     Past Surgical History:   Procedure Laterality Date    Ablation      AV node ablation          Cholecystectomy      Colonoscopy N/A 2017    Procedure: COLONOSCOPY;  Surgeon: Vicente Bryant MD;  [facility]: [facility] ENDOSCOPY    Hemorrhoidectomy      Other surgical history      laparotomy- adhesiolysis of bowel     Family History   Problem Relation Age of Onset    Other (Other) Father         copd    Heart Disorder Father     Melanoma Father     Cancer Father         Basal Cell Cancer    Diabetes Mother     Heart Disorder Mother         Congestive Heart Failure    Renal Disease Mother     Other (Other) Mother     Ovarian Cancer Maternal Grandmother     Stroke Sister     Diabetes Sister     Obesity Sister     Other (Other) Brother         cirrhosis liver    Other (Other) Other         No Family h    Other (Other) Other         No Family h       SHX:  The patient reports that she quit smoking about 28 years ago. Her smoking use included cigarettes. She started smoking about 58 years ago. She has a 30 pack-year smoking history. She has been exposed to tobacco smoke. She has never used smokeless tobacco. She reports current alcohol use. She reports that she does not use drugs.    Allergies:   Allergies[1]    Medications:     Current Outpatient Medications   Medication Instructions    albuterol 108 (90 Base) MCG/ACT Inhalation Aero Soln 2 puffs, Inhalation, Every 6 hours PRN, inhale 2 puff by inhalation route  every 4 - 6 hours as needed    atorvastatin (LIPITOR) 20 mg, Oral, Nightly    cyanocobalamin 1000 MCG/ML Injection Solution INJECT 1 ML INTO THE MUSCLE EVERY 15 DAYS    fluticasone furoate-vilanterol (BREO ELLIPTA) 100-25 MCG/ACT Inhalation Aerosol Powder, Breath Activated 1  puff, Inhalation, Daily, Rinse your mouth with water without swallowing after using BREO to help reduce your chance of getting thrush.    fluticasone propionate 50 MCG/ACT Nasal Suspension SHAKE LIQUID AND USE 2 SPRAYS IN EACH NOSTRIL DAILY    furosemide (LASIX) 20 mg, Oral, Daily as needed    Homeopathic Products (IRRITATED EYE RELIEF OP) Apply to eye.    Irbesartan-hydroCHLOROthiazide (AVALIDE) 150-12.5 MG Oral Tab 1 tablet, Oral, Daily    Potassium Chloride ER 10 MEQ Oral Tab CR 10 mEq, Oral, Daily as needed    Syringe/Needle, Disp, (BD LUER-MARGARITO SYRINGE) 25G X 1\" 3 ML Does not apply Misc USE TWICE A MONTH AS DIRECTED     PHYSICAL EXAM:   Blood pressure (S) (!) 165/55, pulse (!) 29, temperature 97.4 °F (36.3 °C), temperature source Temporal, resp. rate 21, height 5' 1\" (1.549 m), weight 260 lb (117.9 kg), SpO2 95%, not currently breastfeeding.  GEN - no distress  HEENT - normal conjunctiva, moist mucosa  Neck - supple, no LAD  Lungs - nonlabored, clear bilaterally  Heart - JVP 14 cm H2O, carotids normal; reg, meme, nl S1/S2; no edema  Abd - soft, nontender  Ext - arthritic changes  Neuro - A+Ox3, no facial droop or gross deficits  Psych - cooperative, calm    LABS AND DATA:     ECG: Sinus with 3rd degree AV block, narrow complex junctional escape rhythm    Lab Results   Component Value Date    WBC 11.5 (H) 11/08/2024    HGB 13.0 11/08/2024    HCT 39.5 11/08/2024    .0 11/08/2024    CREATSERUM 0.71 08/27/2024    BUN 15 08/27/2024     08/27/2024    K 3.9 08/27/2024     08/27/2024    CO2 30.0 08/27/2024    GLU 92 08/27/2024    CA 9.7 08/27/2024    ALB 4.6 07/27/2024    ALKPHO 113 07/27/2024    BILT 0.4 07/27/2024    TP 7.3 07/27/2024    AST 12 07/27/2024    ALT 11 07/27/2024    PTT 30.5 11/08/2024    INR 0.97 11/08/2024    T4F 1.1 07/27/2024    TSH 5.882 (H) 07/27/2024    LIP 65 (L) 10/07/2021    DDIMER 2.72 (H) 07/27/2024    MG 1.9 10/10/2021    PHOS 3.7 11/30/2021    B12 489 08/27/2024        Imaging: I independently visualized all relevant chest imaging in PACS, agree with radiology interpretation except where noted.    XR CHEST AP PORTABLE  (CPT=71045)    Result Date: 11/8/2024  CONCLUSION:  1. Cardiomegaly with mild pulmonary venous hypertension.  No edema or pneumonia.     Dictated by (CST): Henok Avila MD on 11/08/2024 at 5:05 PM     Finalized by (CST): Henok Avila MD on 11/08/2024 at 5:07 PM             ------------------------------------------------------------------------------------------------------------------------------           [1]   Allergies  Allergen Reactions    Ciprofloxacin HIVES

## 2024-11-09 ENCOUNTER — APPOINTMENT (OUTPATIENT)
Dept: GENERAL RADIOLOGY | Facility: HOSPITAL | Age: 69
End: 2024-11-09
Attending: INTERNAL MEDICINE
Payer: MEDICARE

## 2024-11-09 LAB
ANION GAP SERPL CALC-SCNC: 8 MMOL/L (ref 0–18)
BASOPHILS # BLD AUTO: 0.03 X10(3) UL (ref 0–0.2)
BASOPHILS NFR BLD AUTO: 0.4 %
BUN BLD-MCNC: 18 MG/DL (ref 9–23)
BUN/CREAT SERPL: 22.8 (ref 10–20)
CALCIUM BLD-MCNC: 9.3 MG/DL (ref 8.7–10.4)
CHLORIDE SERPL-SCNC: 109 MMOL/L (ref 98–112)
CO2 SERPL-SCNC: 23 MMOL/L (ref 21–32)
CREAT BLD-MCNC: 0.79 MG/DL
DEPRECATED RDW RBC AUTO: 44.5 FL (ref 35.1–46.3)
EGFRCR SERPLBLD CKD-EPI 2021: 81 ML/MIN/1.73M2 (ref 60–?)
EOSINOPHIL # BLD AUTO: 0.24 X10(3) UL (ref 0–0.7)
EOSINOPHIL NFR BLD AUTO: 2.9 %
ERYTHROCYTE [DISTWIDTH] IN BLOOD BY AUTOMATED COUNT: 13.9 % (ref 11–15)
GLUCOSE BLD-MCNC: 108 MG/DL (ref 70–99)
HCT VFR BLD AUTO: 38.5 %
HGB BLD-MCNC: 12.5 G/DL
IMM GRANULOCYTES # BLD AUTO: 0.03 X10(3) UL (ref 0–1)
IMM GRANULOCYTES NFR BLD: 0.4 %
LYMPHOCYTES # BLD AUTO: 1.89 X10(3) UL (ref 1–4)
LYMPHOCYTES NFR BLD AUTO: 22.9 %
MCH RBC QN AUTO: 28.3 PG (ref 26–34)
MCHC RBC AUTO-ENTMCNC: 32.5 G/DL (ref 31–37)
MCV RBC AUTO: 87.1 FL
MONOCYTES # BLD AUTO: 0.56 X10(3) UL (ref 0.1–1)
MONOCYTES NFR BLD AUTO: 6.8 %
MRSA DNA SPEC QL NAA+PROBE: NEGATIVE
NEUTROPHILS # BLD AUTO: 5.51 X10 (3) UL (ref 1.5–7.7)
NEUTROPHILS # BLD AUTO: 5.51 X10(3) UL (ref 1.5–7.7)
NEUTROPHILS NFR BLD AUTO: 66.6 %
OSMOLALITY SERPL CALC.SUM OF ELEC: 292 MOSM/KG (ref 275–295)
PLATELET # BLD AUTO: 300 10(3)UL (ref 150–450)
POTASSIUM SERPL-SCNC: 3.9 MMOL/L (ref 3.5–5.1)
RBC # BLD AUTO: 4.42 X10(6)UL
SODIUM SERPL-SCNC: 140 MMOL/L (ref 136–145)
WBC # BLD AUTO: 8.3 X10(3) UL (ref 4–11)

## 2024-11-09 PROCEDURE — 71046 X-RAY EXAM CHEST 2 VIEWS: CPT | Performed by: INTERNAL MEDICINE

## 2024-11-09 PROCEDURE — 99233 SBSQ HOSP IP/OBS HIGH 50: CPT | Performed by: HOSPITALIST

## 2024-11-09 RX ORDER — FUROSEMIDE 10 MG/ML
20 INJECTION INTRAMUSCULAR; INTRAVENOUS ONCE
Status: COMPLETED | OUTPATIENT
Start: 2024-11-09 | End: 2024-11-09

## 2024-11-09 RX ORDER — CYANOCOBALAMIN 1000 UG/ML
1000 INJECTION, SOLUTION INTRAMUSCULAR; SUBCUTANEOUS ONCE
Status: COMPLETED | OUTPATIENT
Start: 2024-11-09 | End: 2024-11-09

## 2024-11-09 RX ORDER — SENNOSIDES 8.6 MG
8.6 TABLET ORAL 2 TIMES DAILY
Status: DISCONTINUED | OUTPATIENT
Start: 2024-11-09 | End: 2024-11-10

## 2024-11-09 RX ORDER — ATORVASTATIN CALCIUM 20 MG/1
20 TABLET, FILM COATED ORAL NIGHTLY
Status: DISCONTINUED | OUTPATIENT
Start: 2024-11-09 | End: 2024-11-10

## 2024-11-09 RX ORDER — POLYETHYLENE GLYCOL 3350 17 G/17G
17 POWDER, FOR SOLUTION ORAL 2 TIMES DAILY
Status: DISCONTINUED | OUTPATIENT
Start: 2024-11-09 | End: 2024-11-10

## 2024-11-09 RX ORDER — IRBESARTAN AND HYDROCHLOROTHIAZIDE 150; 12.5 MG/1; MG/1
1 TABLET, FILM COATED ORAL DAILY
Status: DISCONTINUED | OUTPATIENT
Start: 2024-11-09 | End: 2024-11-09 | Stop reason: ALTCHOICE

## 2024-11-09 RX ORDER — FLUTICASONE PROPIONATE 50 MCG
1 SPRAY, SUSPENSION (ML) NASAL 2 TIMES DAILY
Status: DISCONTINUED | OUTPATIENT
Start: 2024-11-09 | End: 2024-11-10

## 2024-11-09 NOTE — PLAN OF CARE
Pt alert and oriented x4. Pt on room air. Once IV lasix given per Cardiology. Pt ambulating sba.   Problem: Patient Centered Care  Goal: Patient preferences are identified and integrated in the patient's plan of care  Description: Interventions:  - What would you like us to know as we care for you? From home with    - Provide timely, complete, and accurate information to patient/family  - Incorporate patient and family knowledge, values, beliefs, and cultural backgrounds into the planning and delivery of care  - Encourage patient/family to participate in care and decision-making at the level they choose  - Honor patient and family perspectives and choices  Outcome: Progressing     Problem: CARDIOVASCULAR - ADULT  Goal: Absence of cardiac arrhythmias or at baseline  Description: INTERVENTIONS:  - Continuous cardiac monitoring, monitor vital signs, obtain 12 lead EKG if indicated  - Evaluate effectiveness of antiarrhythmic and heart rate control medications as ordered  - Initiate emergency measures for life threatening arrhythmias  - Monitor electrolytes and administer replacement therapy as ordered  Outcome: Progressing     Problem: METABOLIC/FLUID AND ELECTROLYTES - ADULT  Goal: Electrolytes maintained within normal limits  Description: INTERVENTIONS:  - Monitor labs and rhythm and assess patient for signs and symptoms of electrolyte imbalances  - Administer electrolyte replacement as ordered  - Monitor response to electrolyte replacements, including rhythm and repeat lab results as appropriate  - Fluid restriction as ordered  - Instruct patient on fluid and nutrition restrictions as appropriate  Outcome: Progressing     Problem: PAIN - ADULT  Goal: Verbalizes/displays adequate comfort level or patient's stated pain goal  Description: INTERVENTIONS:  - Encourage pt to monitor pain and request assistance  - Assess pain using appropriate pain scale  - Administer analgesics based on type and severity of pain and  evaluate response  - Implement non-pharmacological measures as appropriate and evaluate response  - Consider cultural and social influences on pain and pain management  - Manage/alleviate anxiety  - Utilize distraction and/or relaxation techniques  - Monitor for opioid side effects  - Notify MD/LIP if interventions unsuccessful or patient reports new pain  - Anticipate increased pain with activity and pre-medicate as appropriate  Outcome: Progressing     Problem: RISK FOR INFECTION - ADULT  Goal: Absence of fever/infection during anticipated neutropenic period  Description: INTERVENTIONS  - Monitor WBC  - Administer growth factors as ordered  - Implement neutropenic guidelines  Outcome: Progressing     Problem: SAFETY ADULT - FALL  Goal: Free from fall injury  Description: INTERVENTIONS:  - Assess pt frequently for physical needs  - Identify cognitive and physical deficits and behaviors that affect risk of falls.  - Pearce fall precautions as indicated by assessment.  - Educate pt/family on patient safety including physical limitations  - Instruct pt to call for assistance with activity based on assessment  - Modify environment to reduce risk of injury  - Provide assistive devices as appropriate  - Consider OT/PT consult to assist with strengthening/mobility  - Encourage toileting schedule  Outcome: Progressing     Problem: DISCHARGE PLANNING  Goal: Discharge to home or other facility with appropriate resources  Description: INTERVENTIONS:  - Identify barriers to discharge w/pt and caregiver  - Include patient/family/discharge partner in discharge planning  - Arrange for needed discharge resources and transportation as appropriate  - Identify discharge learning needs (meds, wound care, etc)  - Arrange for interpreters to assist at discharge as needed  - Consider post-discharge preferences of patient/family/discharge partner  - Complete POLST form as appropriate  - Assess patient's ability to be responsible for  managing their own health  - Refer to Case Management Department for coordinating discharge planning if the patient needs post-hospital services based on physician/LIP order or complex needs related to functional status, cognitive ability or social support system  Outcome: Progressing

## 2024-11-09 NOTE — OPERATIVE REPORT
[facility]    Cardiac Electrophysiology Operative Note    Qing Miranda Location:Cath Lab Suites   [facility] [id number] MRN [id number]   Admission Date 11/8/2024 Procedure Date 11/8/2024   Attending Physician No att. providers found Procedure Physician Lloyd Newton MD       Preprocedure Diagnosis:  3rd degree AV block     Postprocedure Diagnosis:  3rd degree AV block     Procedures:    - Implantation of a MRI-conditional dual-chamber permanent pacemaker, right atrial lead, and right ventricular lead ([employer])  - Fluoroscopy   - Venography   - Moderate conscious sedation     :  Lloyd Newton M.D.      Anesthesia:  I provided moderate conscious sedation from 19:10 to 19:25.  Versed, fentanyl.     Estimated blood loss:  10 mL      Complications:  None apparent.      Findings:  The patient was brought to the operating room in a critically ill state with heart rate of 20-25 bpm. A procedural pause was performed to confirm the patient's identity and the site and type of surgery. The chest was prepped and draped in a sterile fashion. 1% lidocaine was administered in the left chest, and an incision was made medial to the deltopectoral groove. Electrocautery was used to create a small inferomedially directed prepectoral pocket. The left axillary vein was accessed using venography, fluoroscopy, and the modified Seldinger technique once, and 2 J-tipped guide wires were advanced into the inferior vena cava using the retained wire technique. Over the first wire, a 6-Slovenian sheath was advanced into the axillary vein, and through this the right ventricular lead was advanced to the right ventricular septum, confirmed in the [place] and BLEVINS fluoroscopic perspectives. Lead stability and electrical parameters were satisfactory, no extracardiac stimulation at maximum output, and the lead was secured to the pectoralis muscle using 3 separate ties of 0-ethibond suture and the collar. At this time, we  started intravenous conscious sedation.The next wire was used to advance a 6-Tuvaluan sheath into the axillary vein, through which the right atrial lead was advanced to the right atrial appendage. Lead stability and electrical parameters were satisfactory, with no extracardiac stimulation at maximum output, and the lead was secured to the pectoralis muscle using 3 separate ties of 0-ethibond suture and the collar.The generator was connected to the leads, with visual inspection and gentle tugging confirming a secure connection. The device pocket was irrigated with sterile solution and hemostasis was excellent. The generator was placed within the pocket with the leads coiled behind it. The incision was closed with deep and intermediate layers of 2-0 Vicryl suture, the subcuticular layer was approximated with Steri-strips, and a sterile bandage placed over the site. The pacemaker was interrogated via the external , with satisfactory findings. The patient was transported to the recovery area in comfortable and stable condition.    Device Settings:   DDDR  ppm         RESULTS:   - Implantation of a MRI-conditional dual-chamber pacemaker      PLAN:   - IV antibiotics x 24 hours  - CXR and device interrogation in AM  - No heparin, lovenox, or other anticoagulants for 48 hours.   - Incision care as directed.  Keep dry for 5 days.  - Follow-up in [facility] for incision check in 7-10 days.  - Arm restrictions, with sling at night for 1 month.   - Driving/activity restrictions as instructed.     Lloyd Newton M.D.   Cardiac Electrophysiology     11/8/2024  -----------------------------------------

## 2024-11-09 NOTE — PLAN OF CARE
Patient alert and oriented x4. Denies chest pain/SOB. Pacemaker site CDI. Sling on throughout the night. BP elevated upon arrival from cath lab. IV metoprolol administered. BP improved post administration. Reported pain to pacemaker  site. Tylenol #3 administered with good relief. Voiding well. Plan for home for dc pending medical clearance.     Problem: Patient Centered Care  Goal: Patient preferences are identified and integrated in the patient's plan of care  Description: Interventions:  - What would you like us to know as we care for you?   - Provide timely, complete, and accurate information to patient/family  - Incorporate patient and family knowledge, values, beliefs, and cultural backgrounds into the planning and delivery of care  - Encourage patient/family to participate in care and decision-making at the level they choose  - Honor patient and family perspectives and choices  Outcome: Progressing     Problem: CARDIOVASCULAR - ADULT  Goal: Absence of cardiac arrhythmias or at baseline  Description: INTERVENTIONS:  - Continuous cardiac monitoring, monitor vital signs, obtain 12 lead EKG if indicated  - Evaluate effectiveness of antiarrhythmic and heart rate control medications as ordered  - Initiate emergency measures for life threatening arrhythmias  - Monitor electrolytes and administer replacement therapy as ordered  Outcome: Progressing     Problem: METABOLIC/FLUID AND ELECTROLYTES - ADULT  Goal: Electrolytes maintained within normal limits  Description: INTERVENTIONS:  - Monitor labs and rhythm and assess patient for signs and symptoms of electrolyte imbalances  - Administer electrolyte replacement as ordered  - Monitor response to electrolyte replacements, including rhythm and repeat lab results as appropriate  - Fluid restriction as ordered  - Instruct patient on fluid and nutrition restrictions as appropriate  Outcome: Progressing     Problem: PAIN - ADULT  Goal: Verbalizes/displays adequate comfort  level or patient's stated pain goal  Description: INTERVENTIONS:  - Encourage pt to monitor pain and request assistance  - Assess pain using appropriate pain scale  - Administer analgesics based on type and severity of pain and evaluate response  - Implement non-pharmacological measures as appropriate and evaluate response  - Consider cultural and social influences on pain and pain management  - Manage/alleviate anxiety  - Utilize distraction and/or relaxation techniques  - Monitor for opioid side effects  - Notify MD/LIP if interventions unsuccessful or patient reports new pain  - Anticipate increased pain with activity and pre-medicate as appropriate  Outcome: Progressing     Problem: RISK FOR INFECTION - ADULT  Goal: Absence of fever/infection during anticipated neutropenic period  Description: INTERVENTIONS  - Monitor WBC  - Administer growth factors as ordered  - Implement neutropenic guidelines  Outcome: Progressing     Problem: SAFETY ADULT - FALL  Goal: Free from fall injury  Description: INTERVENTIONS:  - Assess pt frequently for physical needs  - Identify cognitive and physical deficits and behaviors that affect risk of falls.  - [place] fall precautions as indicated by assessment.  - Educate pt/family on patient safety including physical limitations  - Instruct pt to call for assistance with activity based on assessment  - Modify environment to reduce risk of injury  - Provide assistive devices as appropriate  - Consider OT/PT consult to assist with strengthening/mobility  - Encourage toileting schedule  Outcome: Progressing     Problem: DISCHARGE PLANNING  Goal: Discharge to home or other facility with appropriate resources  Description: INTERVENTIONS:  - Identify barriers to discharge w/pt and caregiver  - Include patient/family/discharge partner in discharge planning  - Arrange for needed discharge resources and transportation as appropriate  - Identify discharge learning needs (meds, wound care,  etc)  - Arrange for interpreters to assist at discharge as needed  - Consider post-discharge preferences of patient/family/discharge partner  - Complete POLST form as appropriate  - Assess patient's ability to be responsible for managing their own health  - Refer to Case Management Department for coordinating discharge planning if the patient needs post-hospital services based on physician/LIP order or complex needs related to functional status, cognitive ability or social support system  Outcome: Progressing

## 2024-11-09 NOTE — PROGRESS NOTES
[facility] Cardiology Progress Note    Patient seen and examined.  Chart reviewed.       No chest pain or shortness of breath.    /67 (BP Location: Right arm)   Pulse 60   Temp 98.3 °F (36.8 °C) (Oral)   Resp 16   Ht 61\"   Wt 256 lb 9.6 oz   SpO2 94%   BMI 48.48 kg/m²   Gen: alert and oriented  HEENT: moist mucous membranes  Neck: No JVD  CV: regular  Lungs: CTAB  Ext: no edema  Skin: warm and dry    Tele: paced rhythm    Lab Results   Component Value Date    WBC 8.3 2024    HGB 12.5 2024    HCT 38.5 2024    .0 2024    CREATSERUM 0.79 2024    BUN 18 2024     2024    K 3.9 2024     2024    CO2 23.0 2024     2024    CA 9.3 2024    PTT 30.5 2024    INR 0.97 2024    T4F 1.2 2024    TSH 8.393 2024    MG 2.1 2024    TROPHS 20 2024      [COMPLETED] furosemide (Lasix) 10 mg/mL injection 20 mg  20 mg Intravenous Once    atorvastatin (Lipitor) tab 20 mg  20 mg Oral Nightly    fluticasone propionate (Flonase) 50 MCG/ACT nasal suspension 1 spray  1 spray Nasal BID    losartan (Cozaar) 50 mg, hydroCHLOROthiazide 12.5 mg for Avalide /12.5 ([facility] only)   Oral Daily    [] chlorhexidine (Hibiclens) 4 % external liquid   Topical On Call    [] sodium chloride 0.9% infusion   Intravenous On Call    [COMPLETED] ceFAZolin (Ancef) 2g in 10mL IV syringe premix  2 g Intravenous 30 Min Pre-Op    acetaminophen (Tylenol Extra Strength) tab 500 mg  500 mg Oral Q4H PRN    ondansetron (Zofran) 4 MG/2ML injection 4 mg  4 mg Intravenous Q6H PRN    metoclopramide (Reglan) 5 mg/mL injection 10 mg  10 mg Intravenous Q8H PRN    temazepam (Restoril) cap 15 mg  15 mg Oral Nightly PRN    [COMPLETED] midazolam (Versed) 2 MG/2ML injection        [COMPLETED] fentaNYL (Sublimaze) 50 mcg/mL injection        [COMPLETED] lidocaine-EPINEPHrine (Xylocaine-Epinephrine) 1 %-1:224523 injection         [COMPLETED] iopamidol (ISOVUE-300) 61 % injection 100 mL  100 mL Injection ONCE PRN    acetaminophen (Tylenol) tab 650 mg  650 mg Oral Q4H PRN    Or    acetaminophen-codeine (Tylenol #3) 300-30 MG per tab 1 tablet  1 tablet Oral Q4H PRN    Or    acetaminophen-codeine (Tylenol #3) 300-30 MG per tab 2 tablet  2 tablet Oral Q4H PRN    [COMPLETED] ceFAZolin (Ancef) 2g in 10mL IV syringe premix  2 g Intravenous Q8H    [COMPLETED] metoprolol (Lopressor) 5 mg/5mL injection 5 mg  5 mg Intravenous Once     Imp/Recs:  Heart block - symptomatic  HTN  HLP    CV - POD #1 s/p PPM for symptomatic heart block.  Surgical site c/d/I.  With LE swelling - she uses prn diuretic at home.  Plan for lasix 20 mg IVP X1 today.  Home later today.    Continue statin for HLP  HTN - managed with losartan/hydrochlorothiazide - will allow to run slightly high in hospital    Will follow,    Luis Delgado MD

## 2024-11-09 NOTE — PROGRESS NOTES
[facility]  part of [facility]    Progress Note    Qing Miranda Patient Status:  Inpatient    1955 MRN [id number]   Location [facility] 3W/SW Attending Deb Bañuelos MD   Hosp Day # 1 PCP Christos Shore MD       Subjective:   Qing Miranda feels well. Denies any pain or dizziness.    Objective:   Blood pressure 152/67, pulse 60, temperature 98.3 °F (36.8 °C), temperature source Oral, resp. rate 16, height 5' 1\" (1.549 m), weight 256 lb 9.6 oz (116.4 kg), SpO2 94%, not currently breastfeeding.    Physical Exam:    General: No acute distress.   Respiratory: Clear to auscultation bilaterally. No wheezes. No rhonchi.  Cardiovascular: S1, S2. Regular rate and rhythm. No murmurs, rubs or gallops.   Abdomen: Soft, nontender, nondistended.  Positive bowel sounds. No rebound or guarding.  Neurologic: No focal neurological deficits.   Musculoskeletal: Moves all extremities.  Extremities: No edema.    Results:     Lab Results   Component Value Date    WBC 8.3 2024    HGB 12.5 2024    HCT 38.5 2024    .0 2024    CREATSERUM 0.79 2024    BUN 18 2024     2024    K 3.9 2024     2024    CO2 23.0 2024     (H) 2024    CA 9.3 2024    ALB 4.6 2024    ALKPHO 113 2024    BILT 0.4 2024    TP 7.3 2024    AST 12 2024    ALT 11 2024    PTT 30.5 2024    INR 0.97 2024    T4F 1.2 2024    TSH 8.393 (H) 2024    LIP 65 (L) 10/07/2021    DDIMER 2.72 (H) 2024    MG 2.1 2024    PHOS 3.7 2021    B12 489 2024       XR CHEST PA + LAT CHEST (CPT=71046)    Result Date: 2024  CONCLUSION:   Status post left chest wall pacemaker placement, with leads in expected positioning  Interstitial pulmonary edema.  Left basilar curvilinear opacity, may represent atelectasis and/or alveolar edema.    Dictated by (CST):  Ingris Llamas MD on 11/09/2024 at 9:24 AM     Finalized by (CST): Ingris Llamas MD on 11/09/2024 at 9:25 AM          XR CHEST AP PORTABLE  (CPT=71045)    Result Date: 11/8/2024  CONCLUSION:  1. Cardiomegaly with mild pulmonary venous hypertension.  No edema or pneumonia.     Dictated by (CST): Henok Avila MD on 11/08/2024 at 5:05 PM     Finalized by (CST): Henok Avila MD on 11/08/2024 at 5:07 PM         EKG 12 Lead    Result Date: 11/8/2024  Sinus with 3:1 heart block Right bundle branch block Abnormal ECG When compared with ECG of 27-AUG-2024 13:13, AV block now present Vent. rate has decreased BY  36 BPM Confirmed by JOHN RUIZ, ADAIR ([id number]) on 11/8/2024 5:23:12 PM      atorvastatin  20 mg Oral Nightly    fluticasone propionate  1 spray Nasal BID    losartan (Cozaar) 50 mg, hydroCHLOROthiazide 12.5 mg for Avalide /12.5 ([facility] only)   Oral Daily       acetaminophen    ondansetron    metoclopramide    temazepam    acetaminophen **OR** acetaminophen-codeine **OR** acetaminophen-codeine      Assessment and Plan:     Complete heart block (HCC)  - Status post pacemakerplacement   - lasix 30 mg IV x1 now for swelling  - resume home dose of lasix   - home tomorrow    Dyslipidemia  - continue statin       Quality:  DVT Prophylaxis: heparin  CODE status: full    MDM .high       NEYMAR HERNANDEZ MD  11/09/24

## 2024-11-10 VITALS
TEMPERATURE: 99 F | RESPIRATION RATE: 16 BRPM | DIASTOLIC BLOOD PRESSURE: 56 MMHG | WEIGHT: 253.13 LBS | OXYGEN SATURATION: 97 % | HEIGHT: 61 IN | SYSTOLIC BLOOD PRESSURE: 110 MMHG | BODY MASS INDEX: 47.79 KG/M2 | HEART RATE: 65 BPM

## 2024-11-10 LAB
ANION GAP SERPL CALC-SCNC: 6 MMOL/L (ref 0–18)
BILIRUB UR QL: NEGATIVE
BUN BLD-MCNC: 20 MG/DL (ref 9–23)
BUN/CREAT SERPL: 23.5 (ref 10–20)
CALCIUM BLD-MCNC: 9.7 MG/DL (ref 8.7–10.4)
CHLORIDE SERPL-SCNC: 103 MMOL/L (ref 98–112)
CLARITY UR: CLEAR
CO2 SERPL-SCNC: 29 MMOL/L (ref 21–32)
CREAT BLD-MCNC: 0.85 MG/DL
DEPRECATED RDW RBC AUTO: 44.5 FL (ref 35.1–46.3)
EGFRCR SERPLBLD CKD-EPI 2021: 75 ML/MIN/1.73M2 (ref 60–?)
ERYTHROCYTE [DISTWIDTH] IN BLOOD BY AUTOMATED COUNT: 13.8 % (ref 11–15)
GLUCOSE BLD-MCNC: 111 MG/DL (ref 70–99)
GLUCOSE UR-MCNC: NORMAL MG/DL
HCT VFR BLD AUTO: 40.6 %
HGB BLD-MCNC: 12.9 G/DL
HGB UR QL STRIP.AUTO: NEGATIVE
KETONES UR-MCNC: NEGATIVE MG/DL
LEUKOCYTE ESTERASE UR QL STRIP.AUTO: NEGATIVE
MCH RBC QN AUTO: 28 PG (ref 26–34)
MCHC RBC AUTO-ENTMCNC: 31.8 G/DL (ref 31–37)
MCV RBC AUTO: 88.1 FL
NITRITE UR QL STRIP.AUTO: NEGATIVE
OSMOLALITY SERPL CALC.SUM OF ELEC: 289 MOSM/KG (ref 275–295)
PH UR: 5 [PH] (ref 5–8)
PLATELET # BLD AUTO: 331 10(3)UL (ref 150–450)
POTASSIUM SERPL-SCNC: 4.4 MMOL/L (ref 3.5–5.1)
PROT UR-MCNC: NEGATIVE MG/DL
RBC # BLD AUTO: 4.61 X10(6)UL
SODIUM SERPL-SCNC: 138 MMOL/L (ref 136–145)
SP GR UR STRIP: 1.01 (ref 1–1.03)
UROBILINOGEN UR STRIP-ACNC: NORMAL
WBC # BLD AUTO: 8.6 X10(3) UL (ref 4–11)

## 2024-11-10 PROCEDURE — 99239 HOSP IP/OBS DSCHRG MGMT >30: CPT | Performed by: HOSPITALIST

## 2024-11-10 RX ORDER — FUROSEMIDE 20 MG/1
20 TABLET ORAL DAILY
Status: DISCONTINUED | OUTPATIENT
Start: 2024-11-10 | End: 2024-11-10

## 2024-11-10 RX ORDER — FUROSEMIDE 20 MG/1
20 TABLET ORAL DAILY
Qty: 30 TABLET | Refills: 1 | Status: SHIPPED | OUTPATIENT
Start: 2024-11-10

## 2024-11-10 NOTE — PLAN OF CARE
Pt alert and oriented x4. Pt on room air. Pt refused Flonase. UA collected. Plan to discharge home with family. Low grade fever in AM; MD aware. Cardiology okay with discharge. Primary RN educated pt on discharge and discharge instructions.  Pacemaker information given.   Problem: Patient Centered Care  Goal: Patient preferences are identified and integrated in the patient's plan of care  Description: Interventions:  - What would you like us to know as we care for you? From home with   - Provide timely, complete, and accurate information to patient/family  - Incorporate patient and family knowledge, values, beliefs, and cultural backgrounds into the planning and delivery of care  - Encourage patient/family to participate in care and decision-making at the level they choose  - Honor patient and family perspectives and choices  11/10/2024 1417 by Ullrich, Emily  Outcome: Completed  11/10/2024 1133 by Ullrich, Emily  Outcome: Progressing     Problem: CARDIOVASCULAR - ADULT  Goal: Absence of cardiac arrhythmias or at baseline  Description: INTERVENTIONS:  - Continuous cardiac monitoring, monitor vital signs, obtain 12 lead EKG if indicated  - Evaluate effectiveness of antiarrhythmic and heart rate control medications as ordered  - Initiate emergency measures for life threatening arrhythmias  - Monitor electrolytes and administer replacement therapy as ordered  11/10/2024 1417 by Ullrich, Emily  Outcome: Completed  11/10/2024 1133 by Ullrich, Emily  Outcome: Progressing     Problem: METABOLIC/FLUID AND ELECTROLYTES - ADULT  Goal: Electrolytes maintained within normal limits  Description: INTERVENTIONS:  - Monitor labs and rhythm and assess patient for signs and symptoms of electrolyte imbalances  - Administer electrolyte replacement as ordered  - Monitor response to electrolyte replacements, including rhythm and repeat lab results as appropriate  - Fluid restriction as ordered  - Instruct patient on fluid and  nutrition restrictions as appropriate  11/10/2024 1417 by Ullrich, Emily  Outcome: Completed  11/10/2024 1133 by Ullrich, Emily  Outcome: Progressing     Problem: PAIN - ADULT  Goal: Verbalizes/displays adequate comfort level or patient's stated pain goal  Description: INTERVENTIONS:  - Encourage pt to monitor pain and request assistance  - Assess pain using appropriate pain scale  - Administer analgesics based on type and severity of pain and evaluate response  - Implement non-pharmacological measures as appropriate and evaluate response  - Consider cultural and social influences on pain and pain management  - Manage/alleviate anxiety  - Utilize distraction and/or relaxation techniques  - Monitor for opioid side effects  - Notify MD/LIP if interventions unsuccessful or patient reports new pain  - Anticipate increased pain with activity and pre-medicate as appropriate  11/10/2024 1417 by Ullrich, Emily  Outcome: Completed  11/10/2024 1133 by Ullrich, Emily  Outcome: Progressing     Problem: RISK FOR INFECTION - ADULT  Goal: Absence of fever/infection during anticipated neutropenic period  Description: INTERVENTIONS  - Monitor WBC  - Administer growth factors as ordered  - Implement neutropenic guidelines  11/10/2024 1417 by Ullrich, Emily  Outcome: Completed  11/10/2024 1133 by Ullrich, Emily  Outcome: Progressing     Problem: SAFETY ADULT - FALL  Goal: Free from fall injury  Description: INTERVENTIONS:  - Assess pt frequently for physical needs  - Identify cognitive and physical deficits and behaviors that affect risk of falls.  - [place] fall precautions as indicated by assessment.  - Educate pt/family on patient safety including physical limitations  - Instruct pt to call for assistance with activity based on assessment  - Modify environment to reduce risk of injury  - Provide assistive devices as appropriate  - Consider OT/PT consult to assist with strengthening/mobility  - Encourage toileting  schedule  11/10/2024 1417 by Ullrich, Emily  Outcome: Completed  11/10/2024 1133 by Ullrich, Emily  Outcome: Progressing     Problem: DISCHARGE PLANNING  Goal: Discharge to home or other facility with appropriate resources  Description: INTERVENTIONS:  - Identify barriers to discharge w/pt and caregiver  - Include patient/family/discharge partner in discharge planning  - Arrange for needed discharge resources and transportation as appropriate  - Identify discharge learning needs (meds, wound care, etc)  - Arrange for interpreters to assist at discharge as needed  - Consider post-discharge preferences of patient/family/discharge partner  - Complete POLST form as appropriate  - Assess patient's ability to be responsible for managing their own health  - Refer to Case Management Department for coordinating discharge planning if the patient needs post-hospital services based on physician/LIP order or complex needs related to functional status, cognitive ability or social support system  11/10/2024 1417 by Ullrich, Emily  Outcome: Completed  11/10/2024 1133 by Ullrich, Emily  Outcome: Progressing

## 2024-11-10 NOTE — PROGRESS NOTES
Progress Note  Qing Miranda Patient Status:  Inpatient    1955 MRN [id number]   Location [facility] 3W/SW Attending Figueroa Avery MD   Hosp Day # 2 PCP Christos Shore MD     Subjective:  Pt sitting up in chair, feeling much better. Denies complaint.     Objective:  /49 (BP Location: Right arm)   Pulse 65   Temp 99.9 °F (37.7 °C) (Oral)   Resp 16   Ht 5' 1\" (1.549 m)   Wt 253 lb 1.6 oz (114.8 kg)   SpO2 91%   BMI 47.82 kg/m²     Telemetry: AV paced    Intake/Output:    Intake/Output Summary (Last 24 hours) at 11/10/2024 1258  Last data filed at 11/10/2024 1017  Gross per 24 hour   Intake 240 ml   Output 2050 ml   Net -1810 ml       Last 3 Weights   11/10/24 0616 253 lb 1.6 oz (114.8 kg)   24 0500 256 lb 9.6 oz (116.4 kg)   24 1556 260 lb (117.9 kg)   24 1625 260 lb 8 oz (118.2 kg)   24 1540 260 lb (117.9 kg)       Labs:  Recent Labs   Lab 24  1611 24  0740 11/10/24  0732   * 108* 111*   BUN 23 18 20   CREATSERUM 1.04* 0.79 0.85   EGFRCR 59* 81 75   CA 9.8 9.3 9.7    140 138   K 3.8 3.9 4.4    109 103   CO2 23.0 23.0 29.0     Recent Labs   Lab 24  1611 24  0740 11/10/24  0732   RBC 4.54 4.42 4.61   HGB 13.0 12.5 12.9   HCT 39.5 38.5 40.6   MCV 87.0 87.1 88.1   MCH 28.6 28.3 28.0   MCHC 32.9 32.5 31.8   RDW 14.0 13.9 13.8   NEPRELIM 7.94* 5.51  --    WBC 11.5* 8.3 8.6   .0 300.0 331.0         Recent Labs   Lab 24  1611   TROPHS 20       Diagnostics:  No results found.   Review of Systems   Constitutional: Negative.   Cardiovascular:  Positive for leg swelling. Negative for chest pain, dyspnea on exertion, orthopnea and palpitations.   Respiratory:  Negative for cough and shortness of breath.        Physical Exam:    Gen: alert, oriented x 3, NAD  Heent: pupils equal, reactive. Mucous membranes moist.   Neck: no jvd  Cardiac: regular rate and rhythm, normal S1,S2, no murmur, clicks, rub or  gallop  Lungs: CTA  Abd: soft, NT/ND +bs  Ext: trace BLE edema  Skin: Warm, dry, L upper chest w/mepilex dressing CDI  Neuro: No focal deficits      Medications:     furosemide  20 mg Oral Daily    atorvastatin  20 mg Oral Nightly    fluticasone propionate  1 spray Nasal BID    losartan (Cozaar) 50 mg, hydroCHLOROthiazide 12.5 mg for Avalide /12.5 ([facility] only)   Oral Daily    polyethylene glycol (PEG 3350)  17 g Oral BID    sennosides  8.6 mg Oral BID       Assessment:  CHB s/p [employer] Dual Chamber PPM on 11/8  PPM interrogation w/normal lead testing  CXR w/o PTX  Hypertension - controlled  On irbesartan-hydrochlorothiazide at home  Hyperlipidemia - statin  Hx JAY  Hx COPD    Plan:  Resume home irbesartan-hydrochlorothiazide on discharge  Previously takes lasix 20mg po daily as needed; recommend daily dosing on discharge. Can re-evaluate at outpatient follow up.   Would recommend echocardiogram as outpatient   Discussed post PPM site instructions, shoulder precautions, nightly sling use while sleeping, driving restrictions. Pt is stable for discharge from cardiology standpoint. F/U in [facility] device clinic in 1 week.     Plan of care discussed with patient, RN.    Tamera Brambila, APRN  11/10/2024  12:58 PM  [phone] [facility]  [phone] [facility]

## 2024-11-10 NOTE — PLAN OF CARE
Pt alert and oriented x4. Pt on room air. UA per MD. Pt ambulating 1 assist with walker.   Problem: Patient Centered Care  Goal: Patient preferences are identified and integrated in the patient's plan of care  Description: Interventions:  - What would you like us to know as we care for you? From home with family   - Provide timely, complete, and accurate information to patient/family  - Incorporate patient and family knowledge, values, beliefs, and cultural backgrounds into the planning and delivery of care  - Encourage patient/family to participate in care and decision-making at the level they choose  - Honor patient and family perspectives and choices  Outcome: Progressing     Problem: CARDIOVASCULAR - ADULT  Goal: Absence of cardiac arrhythmias or at baseline  Description: INTERVENTIONS:  - Continuous cardiac monitoring, monitor vital signs, obtain 12 lead EKG if indicated  - Evaluate effectiveness of antiarrhythmic and heart rate control medications as ordered  - Initiate emergency measures for life threatening arrhythmias  - Monitor electrolytes and administer replacement therapy as ordered  Outcome: Progressing     Problem: METABOLIC/FLUID AND ELECTROLYTES - ADULT  Goal: Electrolytes maintained within normal limits  Description: INTERVENTIONS:  - Monitor labs and rhythm and assess patient for signs and symptoms of electrolyte imbalances  - Administer electrolyte replacement as ordered  - Monitor response to electrolyte replacements, including rhythm and repeat lab results as appropriate  - Fluid restriction as ordered  - Instruct patient on fluid and nutrition restrictions as appropriate  Outcome: Progressing     Problem: PAIN - ADULT  Goal: Verbalizes/displays adequate comfort level or patient's stated pain goal  Description: INTERVENTIONS:  - Encourage pt to monitor pain and request assistance  - Assess pain using appropriate pain scale  - Administer analgesics based on type and severity of pain and evaluate  response  - Implement non-pharmacological measures as appropriate and evaluate response  - Consider cultural and social influences on pain and pain management  - Manage/alleviate anxiety  - Utilize distraction and/or relaxation techniques  - Monitor for opioid side effects  - Notify MD/LIP if interventions unsuccessful or patient reports new pain  - Anticipate increased pain with activity and pre-medicate as appropriate  Outcome: Progressing     Problem: RISK FOR INFECTION - ADULT  Goal: Absence of fever/infection during anticipated neutropenic period  Description: INTERVENTIONS  - Monitor WBC  - Administer growth factors as ordered  - Implement neutropenic guidelines  Outcome: Progressing     Problem: SAFETY ADULT - FALL  Goal: Free from fall injury  Description: INTERVENTIONS:  - Assess pt frequently for physical needs  - Identify cognitive and physical deficits and behaviors that affect risk of falls.  - [place] fall precautions as indicated by assessment.  - Educate pt/family on patient safety including physical limitations  - Instruct pt to call for assistance with activity based on assessment  - Modify environment to reduce risk of injury  - Provide assistive devices as appropriate  - Consider OT/PT consult to assist with strengthening/mobility  - Encourage toileting schedule  Outcome: Progressing     Problem: DISCHARGE PLANNING  Goal: Discharge to home or other facility with appropriate resources  Description: INTERVENTIONS:  - Identify barriers to discharge w/pt and caregiver  - Include patient/family/discharge partner in discharge planning  - Arrange for needed discharge resources and transportation as appropriate  - Identify discharge learning needs (meds, wound care, etc)  - Arrange for interpreters to assist at discharge as needed  - Consider post-discharge preferences of patient/family/discharge partner  - Complete POLST form as appropriate  - Assess patient's ability to be responsible for managing  their own health  - Refer to Case Management Department for coordinating discharge planning if the patient needs post-hospital services based on physician/LIP order or complex needs related to functional status, cognitive ability or social support system  Outcome: Progressing

## 2024-11-10 NOTE — PLAN OF CARE
Problem: Patient Centered Care  Goal: Patient preferences are identified and integrated in the patient's plan of care  Description: Interventions:  - What would you like us to know as we care for you? From home  - Provide timely, complete, and accurate information to patient/family  - Incorporate patient and family knowledge, values, beliefs, and cultural backgrounds into the planning and delivery of care  - Encourage patient/family to participate in care and decision-making at the level they choose  - Honor patient and family perspectives and choices  Outcome: Progressing     Problem: CARDIOVASCULAR - ADULT  Goal: Absence of cardiac arrhythmias or at baseline  Description: INTERVENTIONS:  - Continuous cardiac monitoring, monitor vital signs, obtain 12 lead EKG if indicated  - Evaluate effectiveness of antiarrhythmic and heart rate control medications as ordered  - Initiate emergency measures for life threatening arrhythmias  - Monitor electrolytes and administer replacement therapy as ordered  Outcome: Progressing     Problem: METABOLIC/FLUID AND ELECTROLYTES - ADULT  Goal: Electrolytes maintained within normal limits  Description: INTERVENTIONS:  - Monitor labs and rhythm and assess patient for signs and symptoms of electrolyte imbalances  - Administer electrolyte replacement as ordered  - Monitor response to electrolyte replacements, including rhythm and repeat lab results as appropriate  - Fluid restriction as ordered  - Instruct patient on fluid and nutrition restrictions as appropriate  Outcome: Progressing     Problem: PAIN - ADULT  Goal: Verbalizes/displays adequate comfort level or patient's stated pain goal  Description: INTERVENTIONS:  - Encourage pt to monitor pain and request assistance  - Assess pain using appropriate pain scale  - Administer analgesics based on type and severity of pain and evaluate response  - Implement non-pharmacological measures as appropriate and evaluate response  - Consider  cultural and social influences on pain and pain management  - Manage/alleviate anxiety  - Utilize distraction and/or relaxation techniques  - Monitor for opioid side effects  - Notify MD/LIP if interventions unsuccessful or patient reports new pain  - Anticipate increased pain with activity and pre-medicate as appropriate  Outcome: Progressing     Problem: RISK FOR INFECTION - ADULT  Goal: Absence of fever/infection during anticipated neutropenic period  Description: INTERVENTIONS  - Monitor WBC  - Administer growth factors as ordered  - Implement neutropenic guidelines  Outcome: Progressing     Problem: SAFETY ADULT - FALL  Goal: Free from fall injury  Description: INTERVENTIONS:  - Assess pt frequently for physical needs  - Identify cognitive and physical deficits and behaviors that affect risk of falls.  - [place] fall precautions as indicated by assessment.  - Educate pt/family on patient safety including physical limitations  - Instruct pt to call for assistance with activity based on assessment  - Modify environment to reduce risk of injury  - Provide assistive devices as appropriate  - Consider OT/PT consult to assist with strengthening/mobility  - Encourage toileting schedule  Outcome: Progressing     Problem: DISCHARGE PLANNING  Goal: Discharge to home or other facility with appropriate resources  Description: INTERVENTIONS:  - Identify barriers to discharge w/pt and caregiver  - Include patient/family/discharge partner in discharge planning  - Arrange for needed discharge resources and transportation as appropriate  - Identify discharge learning needs (meds, wound care, etc)  - Arrange for interpreters to assist at discharge as needed  - Consider post-discharge preferences of patient/family/discharge partner  - Complete POLST form as appropriate  - Assess patient's ability to be responsible for managing their own health  - Refer to Case Management Department for coordinating discharge planning if the  patient needs post-hospital services based on physician/LIP order or complex needs related to functional status, cognitive ability or social support system  Outcome: Progressing

## 2024-11-10 NOTE — SPIRITUAL CARE NOTE
Spiritual Care Visit Note    Patient Name: Qing Miranda Date of Spiritual Care Visit: 24   : 1955 Primary Dx: Complete heart block (HCC)       Referred By:      Spiritual Care Taxonomy:    Intended Effects: Aligning care plan with patient's values    Methods: Accompany someone in their spiritual/Spiritism practice outside your echo tradition    Interventions: Acknowledge current situation    Visit Type/Summary:     - Spiritual Care: Responded to a request via the on call phone Provided support for Patient's spiritual/Spiritism requests. Coordinated Rastafarian Communion and verified NPO status.    Spiritual Care support can be requested via an Epic consult. For urgent/immediate needs, please contact the On Call  at: [place]: ext [phone]       Chaplain Hassan

## 2024-11-10 NOTE — DISCHARGE SUMMARY
[facility]  part of [facility]    Discharge Summary    Qing Miranda Patient Status:  Inpatient    1955 MRN [id number]   Location [facility] 3W/SW Attending Figueroa Avery MD   Hosp Day # 2 PCP Christos Shore MD     Date of Admission: 2024 Disposition:   Home or Self Care     Date of Discharge:   11/10/2024     Admitting Diagnosis:   Complete heart block (HCC) [I44.2]  Heart block [I45.9]    Hospital Discharge Diagnoses:    Complete heart block (HCC)  Status post pacemaker placement   Hx of HL      Problem List:     Patient Active Problem List   Diagnosis    Colon cancer screening    Mixed hyperlipidemia    Vitamin B12 deficiency    Subclinical hypothyroidism    Prediabetes    Vitamin D deficiency    Primary hypertension    Stress    Depression, recurrent (HCC)    Osteopenia of lumbar spine    Atherosclerosis of abdominal aorta (HCC)    Heart block    Complete heart block (HCC)    AV heart block       Physical Exam:      /50 (BP Location: Right arm)   Pulse 61   Temp 99.9 °F (37.7 °C) (Oral)   Resp 16   Ht 5' 1\" (1.549 m)   Wt 253 lb 1.6 oz (114.8 kg)   SpO2 91%   BMI 47.82 kg/m²     Gen:  NAD.  A and O x  3  CV:   RRR.  No m/g/r  Pacemaker wound c/d/i  Pulm:   CTA bilat  Abd:   +bs, soft, NT, ND  LE:  No c/c/e  Neuro:  nonfocal      Reason for Admission:   Complete heart block.     HISTORY OF PRESENT ILLNESS:  The patient is a 69-year-old  female who came into the emergency department for evaluation of progressive shortness of breath, dizziness, and fatigue for last week.  EKG showed profound bradycardia, upper 20s, lower 30s with complete heart block.  CBC showed white blood cell count of 11.5 with left shift.  Chemistry, TSH, troponin, magnesium still pending.  Chest x-ray showed cardiomegaly with mild pulmonary venous hypertension.  No edema or pneumonia.  Patient was evaluated by Cardiology, and she will be admitted to the  hospital for further management.      Hospital Course:      Complete heart block (HCC)  - Status post pacemakerplacement   - was given lasix for swelling.  Home on po lasix.  EP f/u.     Dyslipidemia  - continue statin      Quality:  DVT Prophylaxis: heparin  CODE status: full    Consultations:   Cardiology, EP    Discharge Condition:  Good    Discharge Medications:      Discharge Medications        CHANGE how you take these medications        Instructions Prescription details   furosemide 20 MG Tabs  Commonly known as: Lasix  What changed:   when to take this  reasons to take this      Take 1 tablet (20 mg total) by mouth daily.   Quantity: 30 tablet  Refills: 1            CONTINUE taking these medications        Instructions Prescription details   albuterol 108 (90 Base) MCG/ACT Aers  Commonly known as: Ventolin HFA      Inhale 2 puffs into the lungs every 6 (six) hours as needed. inhale 2 puff by inhalation route  every 4 - 6 hours as needed   Quantity: 1 each  Refills: 2     atorvastatin 20 MG Tabs  Commonly known as: Lipitor      Take 1 tablet (20 mg total) by mouth nightly.   Quantity: 90 tablet  Refills: 0     BD Luer-Richa Syringe 25G X 1\" 3 ML Misc  Generic drug: Syringe/Needle (Disp)      USE TWICE A MONTH AS DIRECTED   Quantity: 24 each  Refills: 2     cyanocobalamin 1000 MCG/ML Soln  Commonly known as: Vitamin B12      INJECT 1 ML INTO THE MUSCLE EVERY 15 DAYS   Quantity: 24 mL  Refills: 1     fluticasone propionate 50 MCG/ACT Susp  Commonly known as: Flonase      SHAKE LIQUID AND USE 2 SPRAYS IN EACH NOSTRIL DAILY   Quantity: 1 each  Refills: 2     Irbesartan-hydroCHLOROthiazide 150-12.5 MG Tabs  Commonly known as: Avalide      Take 1 tablet by mouth daily.   Quantity: 90 tablet  Refills: 1     IRRITATED EYE RELIEF OP      Apply to eye.   Refills: 0     Potassium Chloride ER 10 MEQ Tbcr      Take 1 tablet (10 mEq total) by mouth daily as needed.   Quantity: 30 tablet  Refills: 0            ASK your doctor  about these medications        Instructions Prescription details   fluticasone furoate-vilanterol 100-25 MCG/ACT Aepb  Commonly known as: Breo Ellipta      Inhale 1 puff into the lungs daily. Rinse your mouth with water without swallowing after using BREO to help reduce your chance of getting thrush.   Quantity: 1 each  Refills: 3               Where to Get Your Medications        These medications were sent to [id number] - [place], [place] - [address] AT [address], [phone], [phone]  [address] [address]      Phone: [phone]   furosemide 20 MG Tabs         Follow up Visits:     Follow-up Information       Lloyd Newton MD Follow up in 1 week(s).    Specialties: Cardiac Electrophysiology, CARDIOLOGY  Why: Office will call you to schedule follow up  Contact information:  [address]  [address]  [facility] [address] [phone]                             Hospital Discharge Diagnoses:  Complete heart block    Lace+ Score: 63  59-90 High Risk  29-58 Medium Risk  0-28   Low Risk.    TCM Follow-Up Recommendation:  LACE > 58: High Risk of readmission after discharge from the hospital.    >35 minutes spent preparing this discharge.    Figueroa Ott MD  11/10/2024  1:54 PM

## 2024-11-11 ENCOUNTER — TELEPHONE (OUTPATIENT)
Dept: INTERNAL MEDICINE CLINIC | Facility: CLINIC | Age: 69
End: 2024-11-11

## 2024-11-11 LAB
ATRIAL RATE: 60 BPM
P AXIS: 35 DEGREES
P-R INTERVAL: 178 MS
Q-T INTERVAL: 510 MS
QRS DURATION: 180 MS
QTC CALCULATION (BEZET): 510 MS
R AXIS: -63 DEGREES
T AXIS: 97 DEGREES
VENTRICULAR RATE: 60 BPM

## 2024-11-11 NOTE — SPIRITUAL CARE NOTE
Spiritual Care Visit Note    Patient Name: Qing Miranda Date of Spiritual Care Visit: 11/10/24   : 1955 Primary Dx: Complete heart block (HCC)       Referred By: Referral From: Patient    Spiritual Care Taxonomy:    Intended Effects: Aligning care plan with patient's values    Methods: Assist with spiritual/Yarsanism practices    Interventions: Acknowledge current situation;Active listening;Ask guided questions about echo;Provide hospitality;[place];Explain  role    Visit Type/Summary:     - Spiritual Care: Responded to a request via the on call phone Consulted with RN prior to visit. Offered empathic listening and emotional support. Patient and family expressed appreciation for  visit. Provided information regarding how to contact Spiritual Care and left a Spiritual Care information card. Provided support for Patient's spiritual/Yarsanism requests. Coordinated Taoist Communion and verified NPO status. Offered prayer.  remains available for follow up.    Spiritual Care support can be requested via an Epic consult. For urgent/immediate needs, please contact the On Call  at: [place]: ext [phone]    Chaplain Resident, Debbie Hoffmann PhD

## 2024-11-11 NOTE — TELEPHONE ENCOUNTER
Patient called and is requesting a hospital follow  up appointment, she is asking fro 11/15 and can only do after 4, she also wants to be seen with her  who also needs a hospital follow up appointment.

## 2024-11-12 ENCOUNTER — TELEPHONE (OUTPATIENT)
Dept: INTERNAL MEDICINE CLINIC | Facility: CLINIC | Age: 69
End: 2024-11-12

## 2024-11-12 ENCOUNTER — PATIENT OUTREACH (OUTPATIENT)
Dept: CASE MANAGEMENT | Age: 69
End: 2024-11-12

## 2024-11-12 DIAGNOSIS — I44.30 AV HEART BLOCK: Primary | ICD-10-CM

## 2024-11-12 DIAGNOSIS — Z02.9 ENCOUNTERS FOR UNSPECIFIED ADMINISTRATIVE PURPOSE: ICD-10-CM

## 2024-11-12 PROCEDURE — 1111F DSCHRG MED/CURRENT MED MERGE: CPT

## 2024-11-12 NOTE — PROGRESS NOTES
Transitional Care Management   Discharge Date: 11/10/24  Contact Date: 2024    Assessment:  TCM Initial Assessment    General:  Assessment completed with: Patient  Patient Subjective: The patient returned to work on Monday. The patient did admit to feeling fatigued today after completing a 12 hour shift yesterday. The patient also reported feeling cold at work. The patient has not been able to sleep well at night using the sling. The patient denies any lethargy, confusion, shortness of breath, chest pain, irregular heart beat/palpitations, headache, difficulty concentrating/confusion, or dizziness. The patient did admit to incisional tenderness (rated 1/10). The patient denies any redness, edema, discharge/bleeding, foul odor or heat coming from the incision site. The patient also denies any malaise or fever. The patient denies any bilateral calf edema, pain, warmth, redness or tenderness. The patient did admit to bruising around the past IV sites. The patient also denies any hematuria, melena, or bleeding from the gums, nose or cuts. The patient did report having blood in the stool due to a hemorrhoid that has been present prior to the hospital admission. The patient has not checked blood pressure since returning to the daughter's home. The patient has had family drive per post-operative restrictions.  Chief Complaint: Complete heart block   - Status post pacemakerplacement   Dyslipidemia  Verify patient name and  with patient/ caregiver: Yes    Hospital Stay/Discharge:  Tell me what you understand of why you were in the hospital or emergency department: I had heart failure.  Prior to leaving the hospital were your Discharge Instructions reviewed with you?: Yes  Did you receive a copy of your written Discharge Instructions?: Yes  What questions do you have about your Discharge Instructions?: No  Do you feel better or worse since you left the hospital or emergency department?: Better    Follow - Up  Appointment:  Do you have a follow-up appointment?: Yes  Date: 11/15/24  Physician: Dr. Newton  Are there any barriers to getting to your follow-up appointment?: No    Home Health/DME:  Prior to leaving the hospital was Home Health (HH) arranged for you?: N/A  Are HH needs identified by staff during the assessment?: No     Prior to leaving the hospital or emergency department was Durable Medical Equipment (DME), medical supplies, or infusions arranged for you?: Yes (sling)  Have you received your DME/supplies/infusions?: Yes  Do you have questions about using your DME/supplies/infusions?: No     Medications/Diet:  Did any of your medications change, during or after your hospital stay or ED visit?: Yes  Do you have your new or updated medications?: Yes  Do you understand what your medications are for and possible side effects?: Yes  Are there any reasons that keep you from taking your medication as prescribed?: No  Any concerns about medication refills?: No    Were you given a different diet per your Discharge Instructions?: No     Questions/Concerns:  Do you have any questions or concerns that have not been discussed?: No         Nursing Interventions:    Patient symptoms were assessed, education was completed for signs/symptoms to monitor, and reviewed when to contact providers versus go to the emergency department/call 911.   Reviewed all discharge instructions with a focus on post-operative directions/arm restrictions/wound care.   Nurse care manager confirmed the patient has been following arm lifting and driving restrictions.   NCM did review/stress the importance of fall precautions.   All medications were reviewed and educated on the importance of taking the medications as directed.   Appointments were reviewed and stressed the importance of a close follow up with providers.A telephone encounter was sent to the primary care physician office for an appointment review.   The patient is scheduled for pulmonary  function testing and a PET scan on 11/15/2024.   The patient had an echo and stress test scheduled prior to the hospitalization. The patient plans to discuss scheduled tests with cardiology at the scheduled appointment on 11/15/2024.   Confirmed patient has DME (medical equipment) and understands proper use. (Sling)    The patient verbalized understanding and will contact the office with any further questions or concerns.       Medication Review:   Current Outpatient Medications   Medication Sig Dispense Refill    furosemide 20 MG Oral Tab Take 1 tablet (20 mg total) by mouth daily. 30 tablet 1    Irbesartan-hydroCHLOROthiazide (AVALIDE) 150-12.5 MG Oral Tab Take 1 tablet by mouth daily. 90 tablet 1    fluticasone furoate-vilanterol (BREO ELLIPTA) 100-25 MCG/ACT Inhalation Aerosol Powder, Breath Activated Inhale 1 puff into the lungs daily. Rinse your mouth with water without swallowing after using BREO to help reduce your chance of getting thrush. (Patient not taking: Reported on 11/7/2024) 1 each 3    albuterol 108 (90 Base) MCG/ACT Inhalation Aero Soln Inhale 2 puffs into the lungs every 6 (six) hours as needed. inhale 2 puff by inhalation route  every 4 - 6 hours as needed 1 each 2    Potassium Chloride ER 10 MEQ Oral Tab CR Take 1 tablet (10 mEq total) by mouth daily as needed. 30 tablet 0    fluticasone propionate 50 MCG/ACT Nasal Suspension SHAKE LIQUID AND USE 2 SPRAYS IN EACH NOSTRIL DAILY 1 each 2    cyanocobalamin 1000 MCG/ML Injection Solution INJECT 1 ML INTO THE MUSCLE EVERY 15 DAYS 24 mL 1    Syringe/Needle, Disp, (BD LUER-MARGARITO SYRINGE) 25G X 1\" 3 ML Does not apply Misc USE TWICE A MONTH AS DIRECTED 24 each 2    atorvastatin 20 MG Oral Tab Take 1 tablet (20 mg total) by mouth nightly. 90 tablet 0    Homeopathic Products (IRRITATED EYE RELIEF OP) Apply to eye.       Did patient review medications using current pill bottles and not just a medication list?  No; The patient was not at home during the  Transitional Care Management call.   Discharge medications reviewed/discussed/and reconciled against outpatient medications with patient.  Any changes or updates to medications sent to primary care provider.  Patient Acknowledged    SDOH:   Transportation:   Transportation Needs: No Transportation Needs (11/12/2024)    Transportation Needs     Lack of Transportation: No     Car Seat: Not on file       Financial strain:   Financial Resource Strain: Low Risk  (11/12/2024)    Financial Resource Strain     Difficulty of Paying Living Expenses: Not hard at all     Med Affordability: No       Follow-up Appointments:  Your appointments       Date & Time Appointment Department ([place])    Nov 15, 2024 8:15 AM [facility] PFT Complete with Bronco Dialator with [facility] PFT ROOM [facility] Respiratory Therapy ([facility])    Your appointment is scheduled at [address] [place] [address]. Park in the blue parking lot and enter the campus from the main entrance. Check in at [facility] Main.     Please bring your insurance card and photo ID. You will also need to bring your doctor's order unless your physician's office submitted the order electronically or faxed the order. Without your order, your test may be delayed or postponed.     Patient can eat normal prior to appointment time. If you take any inhaler medication, either the inhaler or nebulizer, do not use 4 hours prior to appointment. For this appointment, you must bring a list of any medications you are currently taking including vitamin supplements, with dosage and frequency.        Nov 15, 2024 9:45 AM CST PET WHOLE BODY SCAN TO THIGH DOSE with [facility] PET DOSE RM [facility] PET Scan - [place] for [facility] ([facility])    Please arrive 15 minutes early for this appointment.     BEFORE YOUR EXAM  -No strenuous activity for 48 hours before your exam.  -After 5:00 PM the night before your test, avoid  carbohydrates (no candy, gum, mints, ice cream, cake, sweets, or soda pop).  -Do not eat any food for 6 hours before your appointment time; please only drink plain water.    DIABETIC PATIENTS ONLY  -If you are taking Metformin, please withhold for at least 48 hours before your exam.  -Do not take any insulin within 4 hours of your appointment.  If you are taking long-acting insulin, take only half the normal dose at your normal time.   -Please avoid carbohydrates the entire day before the scan.  -Do not take oral diabetes medication 4 hours prior to your test.     ON THE DAY OF YOUR EXAM  -Dress warmly and comfortably.  No metal in your clothes, such as snaps or zippers, and please leave valuables/jewelry at home.  -For this test, you will receive an injection, rest for 30-90 minutes and take a 15-50 minute scan.  -There are no side effects to the injection.        Nov 15, 2024 11:00 AM [facility] PET WHOLE BODY SCAN TO THIGH with [facility] PET [address] PET Scan [facility] ([facility])    Please arrive 15 minutes early for this appointment.     BEFORE YOUR EXAM  -No strenuous activity for 48 hours before your exam.  -After 5:00 PM the night before your test, avoid carbohydrates (no candy, gum, mints, ice cream, cake, sweets, or soda pop).  -Do not eat any food for 6 hours before your appointment time; please only drink plain water.    DIABETIC PATIENTS ONLY  -If you are taking Metformin, please withhold for at least 48 hours before your exam.  -Do not take any insulin within 4 hours of your appointment.  If you are taking long-acting insulin, take only half the normal dose at your normal time.   -Please avoid carbohydrates the entire day before the scan.  -Do not take oral diabetes medication 4 hours prior to your test.     ON THE DAY OF YOUR EXAM  -Dress warmly and comfortably.  No metal in your clothes, such as snaps or zippers, and please leave valuables/jewelry at home.  -For  this test, you will receive an injection, rest for 30-90 minutes and take a 15-50 minute scan.  -There are no side effects to the injection.        Nov 21, 2024 2:00 PM [facility] Hospital Follow Up with Christos Shore MD [facility] ([facility])        Nov 27, 2024 7:30 AM CST 2D ECHOCARDIOGRAM (ECHO) with [facility] CARD RM3 ECHO [facility] ([facility])    Please wear slacks and a top for your comfort. Do not wear a dress.        Nov 27, 2024 9:30 AM CST NM CARD DOSE with EM NM RM1 [facility] Nuclear Medicine ([facility])    Canceling or rescheduling appointments within 48 hours of your appointment, including no shows, may be subject to cancellation fees.    Please have no alcohol, caffeine, or tobacco for 12 hours prior to your exam.  This includes decaffeinated beverages which have a small amount of caffeine still in them.    You may have breakfast or lunch depending on the time of your exam.    Bring a list of prescription medications you are currently taking and make sure to call your doctor's office to see if you should stop the use of any medication prior to testing.    Please wear comfortable clothing and shoes.    Testing will take approximately 3 hours to be completed and is only done at the [facility].    A nurse will call the night before to confirm appointment and review test instructions.        Nov 27, 2024 10:45 AM CST CARD STRESS with EM CARD RM1 [facility] ([facility])    Canceling or rescheduling appointments within 48 hours of your appointment, including no shows, may be subject to cancellation fees.    Please have no alcohol, caffeine, or tobacco for 12 hours prior to your exam.  This includes decaffeinated beverages which have a small amount of caffeine still in them.    You may have breakfast or lunch depending on the  time of your exam.    Bring a list of prescription medications you are currently taking and make sure to call your doctor's office to see if you should stop the use of any medication prior to testing.    Please wear comfortable clothing and shoes.    Testing will take approximately 3 hours to be completed and is only done at the [facility].    A nurse will call the night before to confirm appointment and review test instructions.        Nov 27, 2024 11:45 AM [facility] NM CARD SCAN with [facility] Nuclear Medicine ([facility])    Canceling or rescheduling appointments within 48 hours of your appointment, including no shows, may be subject to cancellation fees.    Please have no alcohol, caffeine, or tobacco for 12 hours prior to your exam.  This includes decaffeinated beverages which have a small amount of caffeine still in them.    You may have breakfast or lunch depending on the time of your exam.    Bring a list of prescription medications you are currently taking and make sure to call your doctor's office to see if you should stop the use of any medication prior to testing.    Please wear comfortable clothing and shoes.    Testing will take approximately 3 hours to be completed and is only done at the [facility].    A nurse will call the night before to confirm appointment and review test instructions.              [facility]  [facility]  [address] [facility] [id number] [phone] [facility] Nuclear Medicine  [facility]  [address] [facility] [id number] [phone] [facility] PET Scan [facility]  [facility]  [address] [facility] [address]  [phone]    [facility] Respiratory Therapy  [facility]  [address] [facility] [address]  [phone] [facility] Medical [facility], [facility],  [address] [id number] [phone]            Transitional Care Clinic  Was TCC Ordered: No      Primary Care Provider (If no TCC appointment)  Does patient already have a PCP appointment scheduled? Yes; The patient is scheduled with Dr. Shore on 11/21/2024.   Nurse Care Manager Confirmed PCP office TCM appointment with patient.   Nurse care manager attempted to schedule a sooner appointment with Dr. Shore, but there were no visits available.   The patient did mention a request for a hospital follow up visit with Dr. Shore on 11/15/2024 after 4:00, but there are no appointments at that time.     The patient requested the appointment on 11/21/2024 since the spouse will be seen in the office on the same date.     A telephone encounter was sent to the primary care physician office for an appointment review/request.       Specialist  Does the patient have any other follow-up appointment(s) that need to be scheduled? Yes   -If yes: Nurse Care Manager reviewed upcoming specialist appointments with patient: Yes    The patient is scheduled with Dr. Newton and the [facility] on 11/15/2024.      -Does the patient need assistance scheduling appointment(s): No    CCM referral placed:  No    Book By Date: 11/17/2024

## 2024-11-12 NOTE — TELEPHONE ENCOUNTER
FYI: Spoke to the patient today for a Transitional Care Management hospital follow up call.     The patient had a Transitional Care Management hospital follow up appointment with Dr. Shore scheduled on 11/21/2024, prior to the Transitional Care Management call. The appointment length is for 15 minutes when Transitional Care Management appointment lengths should be 30 minutes.     Nurse care manager attempted to schedule a sooner appointment with Dr. Shore, but there were no visits available.     A Transitional Care Management/hospital follow up appointment is recommended by 11/17/2024, as the patient is a high risk for readmission.      The patent prefers to keep the appointment on 11/21/2024 since the spouse is also scheduled that date.     The patient did report preference to be seen on 11/15 by Dr. Shore after 4:00 but there are no appointments available at that time. Please advise.    BOOK BY DATE (last date for Transitional Care Management): 11/24/2024    Please follow up with the patient and assist with scheduling a sooner Transitional Care Management/hospital follow up appointment.  Thank you!

## 2024-11-15 ENCOUNTER — HOSPITAL ENCOUNTER (OUTPATIENT)
Dept: RESPIRATORY THERAPY | Facility: HOSPITAL | Age: 69
Discharge: HOME | End: 2024-11-15
Attending: INTERNAL MEDICINE
Payer: MEDICARE

## 2024-11-15 ENCOUNTER — HOSPITAL ENCOUNTER (OUTPATIENT)
Dept: NUCLEAR MEDICINE | Facility: HOSPITAL | Age: 69
Discharge: HOME | End: 2024-11-15
Attending: INTERNAL MEDICINE
Payer: MEDICARE

## 2024-11-15 DIAGNOSIS — J44.9 CHRONIC OBSTRUCTIVE PULMONARY DISEASE, UNSPECIFIED COPD TYPE (HCC): ICD-10-CM

## 2024-11-15 DIAGNOSIS — R91.1 LUNG NODULE: ICD-10-CM

## 2024-11-15 LAB — GLUCOSE BLDC GLUCOMTR-MCNC: 121 MG/DL (ref 70–99)

## 2024-11-15 PROCEDURE — 78815 PET IMAGE W/CT SKULL-THIGH: CPT | Performed by: INTERNAL MEDICINE

## 2024-11-15 PROCEDURE — 94010 BREATHING CAPACITY TEST: CPT

## 2024-11-15 PROCEDURE — 82962 GLUCOSE BLOOD TEST: CPT

## 2024-11-15 PROCEDURE — 94729 DIFFUSING CAPACITY: CPT

## 2024-11-15 PROCEDURE — 94726 PLETHYSMOGRAPHY LUNG VOLUMES: CPT

## 2024-11-15 NOTE — PROCEDURES
[facility]  part of [facility]     Pulmonary Function Test     Qing Miranda Patient Status:  Outpatient    1955 MRN [id number]   Date of Exam 11/15/2024 PCP Christos Shore MD           Spirometry   FEV1: 2.11 109%  FVC: 2.48 101%  FEV1/FVC: 0.85    Lung Volume   T.19 93%  RV : 1.62 94%    Diffusion Capacity   DLCO: 16.00 91%    Flow Volume Loop       Impression   No evidence of obstructive defect seen.  Unable to comment on postbronchodilator responses not performed.  Normal lung volumes.  Normal diffusion capacity    Delfino Chun DO  Pulmonary Critical Care Medicine  [facility]

## 2024-11-20 ENCOUNTER — TELEPHONE (OUTPATIENT)
Dept: PULMONOLOGY | Facility: CLINIC | Age: 69
End: 2024-11-20

## 2024-11-20 DIAGNOSIS — R91.8 LUNG NODULES: Primary | ICD-10-CM

## 2024-11-20 NOTE — TELEPHONE ENCOUNTER
Called the patient and discussed with her the PET scan finding which was negative and does 2 small lung nodules slightly smaller  PFTs normal  Patient quit smoking 1996  Will follow-up chest CT in 6-month  Patient voiced understanding

## 2024-11-21 ENCOUNTER — OFFICE VISIT (OUTPATIENT)
Dept: INTERNAL MEDICINE CLINIC | Facility: CLINIC | Age: 69
End: 2024-11-21
Payer: MEDICARE

## 2024-11-21 VITALS
HEART RATE: 72 BPM | WEIGHT: 259.31 LBS | BODY MASS INDEX: 48.96 KG/M2 | DIASTOLIC BLOOD PRESSURE: 74 MMHG | HEIGHT: 61 IN | TEMPERATURE: 99 F | SYSTOLIC BLOOD PRESSURE: 127 MMHG | OXYGEN SATURATION: 96 %

## 2024-11-21 DIAGNOSIS — R73.03 PREDIABETES: ICD-10-CM

## 2024-11-21 DIAGNOSIS — E53.8 VITAMIN B12 DEFICIENCY: ICD-10-CM

## 2024-11-21 DIAGNOSIS — I70.0 ATHEROSCLEROSIS OF ABDOMINAL AORTA (HCC): ICD-10-CM

## 2024-11-21 DIAGNOSIS — E03.8 SUBCLINICAL HYPOTHYROIDISM: ICD-10-CM

## 2024-11-21 DIAGNOSIS — E55.9 VITAMIN D DEFICIENCY: ICD-10-CM

## 2024-11-21 DIAGNOSIS — Z95.0 S/P PLACEMENT OF CARDIAC PACEMAKER: ICD-10-CM

## 2024-11-21 DIAGNOSIS — I44.2 AV BLOCK, 3RD DEGREE (HCC): Primary | ICD-10-CM

## 2024-11-21 DIAGNOSIS — I25.10 ATHEROSCLEROSIS OF NATIVE CORONARY ARTERY OF NATIVE HEART WITHOUT ANGINA PECTORIS: ICD-10-CM

## 2024-11-21 DIAGNOSIS — E78.2 MIXED HYPERLIPIDEMIA: ICD-10-CM

## 2024-11-21 DIAGNOSIS — R91.1 PULMONARY NODULE: ICD-10-CM

## 2024-11-21 DIAGNOSIS — I10 PRIMARY HYPERTENSION: ICD-10-CM

## 2024-11-21 DIAGNOSIS — I34.0 NONRHEUMATIC MITRAL VALVE REGURGITATION: ICD-10-CM

## 2024-11-21 PROBLEM — F33.9 DEPRESSION, RECURRENT: Status: RESOLVED | Noted: 2023-02-19 | Resolved: 2024-11-21

## 2024-11-21 PROBLEM — F33.9 DEPRESSION, RECURRENT (HCC): Status: RESOLVED | Noted: 2023-02-19 | Resolved: 2024-11-21

## 2024-11-24 NOTE — PROGRESS NOTES
Subjective:   Qing Miranda is a 69 year old female who presents for hospital follow up.   She was discharged from [facility] to Home or Self Care  Admission Date: 11/8/24   Discharge Date: 11/10/24  Hospital Discharge Diagnosis: 3rd degree AV Block; s/p PPM,  hypertension. Hyperlipidemia; coronary atherosclerosis, prediabetes    Interactive contact within 2 business days post discharge first initiated on Date: 11/12/2024    I accessed [employer] and/or [employer] Everywhere and personally reviewed the following for the recent hospitalization: provider notes, consults, summaries, labs and other test results and the pertinent findings are documented below.     HPI: pt went to [facility] ER due to increasing SOB and chest discomfort, found to be in 3rd degree AV block, underwent cardiac pacemake placement done by Dr Newton. Pt did well postprocedure and discharged home a day after.  Patient has had resolution of her symptoms since pacemaker placement and  had gone back to work already.    History/Other:   Current Medications:  Medication Reconciliation:  I am aware of an inpatient discharge within the last 30 days.  The discharge medication list has been reconciled with the patient's current medication list and reviewed by me. See medication list for additions of new medication, and changes to current doses of medications and discontinued medications.  Outpatient Medications Marked as Taking for the 11/21/24 encounter (Office Visit) with Christos Shore MD   Medication Sig    furosemide 20 MG Oral Tab Take 1 tablet (20 mg total) by mouth daily.    Potassium Chloride ER 10 MEQ Oral Tab CR Take 1 tablet (10 mEq total) by mouth daily as needed.    fluticasone propionate 50 MCG/ACT Nasal Suspension SHAKE LIQUID AND USE 2 SPRAYS IN EACH NOSTRIL DAILY    cyanocobalamin 1000 MCG/ML Injection Solution INJECT 1 ML INTO THE MUSCLE EVERY 15 DAYS    Syringe/Needle, Disp, (BD LUER-MARGARITO SYRINGE) 25G X 1\" 3 ML Does not apply Misc USE  TWICE A MONTH AS DIRECTED    atorvastatin 20 MG Oral Tab Take 1 tablet (20 mg total) by mouth nightly.    Homeopathic Products (IRRITATED EYE RELIEF OP) Apply to eye.       Review of Systems:  GENERAL: weight stable, energy stable, no sweating  SKIN: denies any unusual skin lesions  EYES: denies blurred vision or double vision  HEENT: denies nasal congestion, sinus pain or ST  LUNGS: denies shortness of breath with exertion  CARDIOVASCULAR: denies chest pain on exertion or palpitations  GI: denies abdominal pain, denies heartburn, denies diarrhea  MUSCULOSKELETAL: denies pain, normal range of motion of extremities  NEURO: denies headaches, denies dizziness, denies weakness  PSYCHE: denies depression or anxiety  HEMATOLOGIC: denies hx of anemia, or bruising, denies bleeding  ENDOCRINE: denies thyroid history  ALL/ASTHMA: denies hx of allergy or asthma    Objective:   No LMP recorded. Patient is postmenopausal.  Estimated body mass index is 48.99 kg/m² as calculated from the following:    Height as of this encounter: 5' 1\" (1.549 m).    Weight as of this encounter: 259 lb 4.8 oz (117.6 kg).   /74 (BP Location: Left arm, Patient Position: Sitting, Cuff Size: large)   Pulse 72   Temp 98.6 °F (37 °C) (Tympanic)   Ht 5' 1\" (1.549 m)   Wt 259 lb 4.8 oz (117.6 kg)   SpO2 96%   BMI 48.99 kg/m²    GENERAL: well developed, well nourished, in no apparent distress  SKIN: no rashes, no suspicious lesions  HEENT: atraumatic, normocephalic, ears and throat are clear  EYES: PERRLA, EOMI, conjunctiva are clear  NECK: supple, no adenopathy, no bruits  CHEST: no chest tenderness  LUNGS: clear to auscultation  CARDIO: RRR without murmur  GI: good BS's, no masses, HSM or tenderness  MUSCULOSKELETAL: back is not tender, FROM of the extremities  EXTREMITIES: no cyanosis, clubbing or edema  NEURO: Oriented times three, cranial nerves are intact, motor and sensory are grossly intact    Assessment & Plan:   (I44.2) AV block, 3rd  degree (HCC)  (primary encounter diagnosis)  Plan: Patient had permanent cardiac pacemaker done.  She will follow-up with her cardiologist.    (Z95.0) S/P placement of cardiac pacemaker  Plan: See above.    (I25.10) Atherosclerosis of native coronary artery of native heart without angina pectoris  Plan: Currently asymptomatic since having a pacemaker P poor.  Has had coronary calcification seen on her CT of the chest before.  I had already ordered stress test for the patient which she will be scheduled to do this month.  I had given her instructions again if she ever develops chest pains she should go to ER.    (I10) Primary hypertension  Plan: Blood pressure controlled with current blood pressure meds.    (E78.2) Mixed hyperlipidemia  Plan: Patient ready and statin therapy.    (I70.0) Atherosclerosis of abdominal aorta (HCC)  Plan: Patient ready on statin therapy.    (R73.03) Prediabetes  Plan: Continue to watch diet cut down on carbs in diet and work on losing weight.    (E03.8) Subclinical hypothyroidism  Plan: Continue to monitor thyroid function test.    (R91.1) Pulmonary nodule  Plan: This has been followed now by is pulmonologist and had PET scan done ready ordered by pulmonary service.    (I34.0) Nonrheumatic mitral valve regurgitation  Plan: Had been stable been followed with 2D echo before.    (E55.9) Vitamin D deficiency  Plan: Continue with vitamin D supplement.    (E53.8) Vitamin B12 deficiency  Plan: Continue with vitamin D 12 supplement.         No follow-ups on file.

## 2024-12-30 NOTE — TELEPHONE ENCOUNTER
Please Review. Protocol Failed; No Protocol     Requested Prescriptions   Pending Prescriptions Disp Refills    POTASSIUM CHLORIDE ER 10 MEQ Oral Tab CR [Pharmacy Med Name: POTASSIUM CL 10MEQ ER TABLETS] 90 tablet 0     Sig: TAKE 1 TABLET(10 MEQ) BY MOUTH DAILY AS NEEDED       There is no refill protocol information for this order              Recent Outpatient Visits              1 month ago AV block, 3rd degree [facility]    1 month ago [facility]    [facility]    1 month ago Chronic obstructive pulmonary disease, unspecified COPD type ([facility])    [facility] Marques James MD    Office Visit    3 months ago JAY (obstructive sleep apnea)    [facility]    Office Visit    4 months ago Other fatigue    [facility] Christos Kennedy MD    Office Visit

## 2024-12-31 RX ORDER — POTASSIUM CHLORIDE 750 MG/1
10 TABLET, EXTENDED RELEASE ORAL DAILY PRN
Qty: 90 TABLET | Refills: 3 | Status: SHIPPED | OUTPATIENT
Start: 2024-12-31

## 2025-02-18 NOTE — TELEPHONE ENCOUNTER
Patient contacts clinic with low BP reading today after shoveling snow. 111/53, 87. After resting BP was 128/64, 87. She denies dizziness, lightheadedness, chest pain or shortness of breath. She does not have previous BP readings to provide. Recently started on irbesartan-hydrochlorothiazide. Concerned that BP was low after vigorous activity and inquires if she should hold dose. Dr. Elsie Gillis please advise. Would need to be seen  OV tomorrow or urgent care tonight if not keeping liquids down

## 2025-02-21 ENCOUNTER — LAB ENCOUNTER (OUTPATIENT)
Dept: LAB | Age: 70
End: 2025-02-21
Attending: INTERNAL MEDICINE
Payer: MEDICARE

## 2025-02-21 ENCOUNTER — OFFICE VISIT (OUTPATIENT)
Dept: INTERNAL MEDICINE CLINIC | Facility: CLINIC | Age: 70
End: 2025-02-21
Payer: MEDICARE

## 2025-02-21 ENCOUNTER — NURSE TRIAGE (OUTPATIENT)
Dept: INTERNAL MEDICINE CLINIC | Facility: CLINIC | Age: 70
End: 2025-02-21

## 2025-02-21 VITALS
HEART RATE: 73 BPM | OXYGEN SATURATION: 99 % | TEMPERATURE: 100 F | DIASTOLIC BLOOD PRESSURE: 76 MMHG | SYSTOLIC BLOOD PRESSURE: 130 MMHG | BODY MASS INDEX: 51 KG/M2 | WEIGHT: 271.06 LBS

## 2025-02-21 DIAGNOSIS — J02.9 SORE THROAT: ICD-10-CM

## 2025-02-21 DIAGNOSIS — J02.9 SORE THROAT: Primary | ICD-10-CM

## 2025-02-21 DIAGNOSIS — Z00.00 MEDICARE ANNUAL WELLNESS VISIT, SUBSEQUENT: ICD-10-CM

## 2025-02-21 DIAGNOSIS — R73.03 PREDIABETES: ICD-10-CM

## 2025-02-21 DIAGNOSIS — E03.8 SUBCLINICAL HYPOTHYROIDISM: ICD-10-CM

## 2025-02-21 DIAGNOSIS — E55.9 VITAMIN D DEFICIENCY: ICD-10-CM

## 2025-02-21 DIAGNOSIS — J06.9 UPPER RESPIRATORY TRACT INFECTION, UNSPECIFIED TYPE: ICD-10-CM

## 2025-02-21 LAB
BASOPHILS # BLD AUTO: 0.04 X10(3) UL (ref 0–0.2)
BASOPHILS NFR BLD AUTO: 0.5 %
CONTROL LINE PRESENT WITH A CLEAR BACKGROUND (YES/NO): YES YES/NO
DEPRECATED RDW RBC AUTO: 43.9 FL (ref 35.1–46.3)
EOSINOPHIL # BLD AUTO: 0.18 X10(3) UL (ref 0–0.7)
EOSINOPHIL NFR BLD AUTO: 2.3 %
ERYTHROCYTE [DISTWIDTH] IN BLOOD BY AUTOMATED COUNT: 13.6 % (ref 11–15)
EST. AVERAGE GLUCOSE BLD GHB EST-MCNC: 131 MG/DL (ref 68–126)
HBA1C MFR BLD: 6.2 % (ref ?–5.7)
HCT VFR BLD AUTO: 40.9 %
HGB BLD-MCNC: 13.2 G/DL
IMM GRANULOCYTES # BLD AUTO: 0.02 X10(3) UL (ref 0–1)
IMM GRANULOCYTES NFR BLD: 0.3 %
KIT LOT #: NORMAL NUMERIC
LYMPHOCYTES # BLD AUTO: 1.66 X10(3) UL (ref 1–4)
LYMPHOCYTES NFR BLD AUTO: 21.3 %
MCH RBC QN AUTO: 28.5 PG (ref 26–34)
MCHC RBC AUTO-ENTMCNC: 32.3 G/DL (ref 31–37)
MCV RBC AUTO: 88.3 FL
MONOCYTES # BLD AUTO: 0.57 X10(3) UL (ref 0.1–1)
MONOCYTES NFR BLD AUTO: 7.3 %
NEUTROPHILS # BLD AUTO: 5.31 X10 (3) UL (ref 1.5–7.7)
NEUTROPHILS # BLD AUTO: 5.31 X10(3) UL (ref 1.5–7.7)
NEUTROPHILS NFR BLD AUTO: 68.3 %
PLATELET # BLD AUTO: 304 10(3)UL (ref 150–450)
RBC # BLD AUTO: 4.63 X10(6)UL
STREP GRP A CUL-SCR: NEGATIVE
T4 FREE SERPL-MCNC: 1.1 NG/DL (ref 0.8–1.7)
TSI SER-ACNC: 6.46 UIU/ML (ref 0.55–4.78)
VIT B12 SERPL-MCNC: 338 PG/ML (ref 211–911)
VIT D+METAB SERPL-MCNC: 10.7 NG/ML (ref 30–100)
WBC # BLD AUTO: 7.8 X10(3) UL (ref 4–11)

## 2025-02-21 PROCEDURE — 82306 VITAMIN D 25 HYDROXY: CPT

## 2025-02-21 PROCEDURE — 99213 OFFICE O/P EST LOW 20 MIN: CPT | Performed by: INTERNAL MEDICINE

## 2025-02-21 PROCEDURE — 85025 COMPLETE CBC W/AUTO DIFF WBC: CPT

## 2025-02-21 PROCEDURE — 84443 ASSAY THYROID STIM HORMONE: CPT

## 2025-02-21 PROCEDURE — 87637 SARSCOV2&INF A&B&RSV AMP PRB: CPT

## 2025-02-21 PROCEDURE — 36415 COLL VENOUS BLD VENIPUNCTURE: CPT

## 2025-02-21 PROCEDURE — 82607 VITAMIN B-12: CPT | Performed by: INTERNAL MEDICINE

## 2025-02-21 PROCEDURE — 84439 ASSAY OF FREE THYROXINE: CPT

## 2025-02-21 PROCEDURE — 87880 STREP A ASSAY W/OPTIC: CPT | Performed by: INTERNAL MEDICINE

## 2025-02-21 PROCEDURE — 83036 HEMOGLOBIN GLYCOSYLATED A1C: CPT

## 2025-02-21 NOTE — PROGRESS NOTES
Subjective:     Patient ID: Qing Miranda is a 70 year old female.    Sinus Problem  This is a new problem. The problem is unchanged. Maximum temperature: 100F. The pain is mild. Associated symptoms include congestion, coughing, ear pain, headaches, sinus pressure and a sore throat. Pertinent negatives include no hoarse voice or shortness of breath. Past treatments include nothing. The treatment provided no relief.       History/Other:   Review of Systems   Constitutional:  Positive for fever.   HENT:  Positive for congestion, ear pain, postnasal drip, rhinorrhea, sinus pressure, sinus pain and sore throat. Negative for hoarse voice, trouble swallowing and voice change.    Respiratory:  Positive for cough. Negative for shortness of breath.         Occasional cough from postnasal drip   Cardiovascular: Negative.    Gastrointestinal:  Negative for vomiting.   Musculoskeletal:  Positive for myalgias.   Neurological:  Positive for headaches.     Current Outpatient Medications   Medication Sig Dispense Refill    Potassium Chloride ER 10 MEQ Oral Tab CR Take 1 tablet (10 mEq total) by mouth daily as needed. 90 tablet 3    furosemide 20 MG Oral Tab Take 1 tablet (20 mg total) by mouth daily. 30 tablet 1    fluticasone furoate-vilanterol (BREO ELLIPTA) 100-25 MCG/ACT Inhalation Aerosol Powder, Breath Activated Inhale 1 puff into the lungs daily. Rinse your mouth with water without swallowing after using BREO to help reduce your chance of getting thrush. 1 each 3    fluticasone propionate 50 MCG/ACT Nasal Suspension SHAKE LIQUID AND USE 2 SPRAYS IN EACH NOSTRIL DAILY 1 each 2    cyanocobalamin 1000 MCG/ML Injection Solution INJECT 1 ML INTO THE MUSCLE EVERY 15 DAYS 24 mL 1    Syringe/Needle, Disp, (BD LUER-MARGARITO SYRINGE) 25G X 1\" 3 ML Does not apply Misc USE TWICE A MONTH AS DIRECTED 24 each 2    atorvastatin 20 MG Oral Tab Take 1 tablet (20 mg total) by mouth nightly. 90 tablet 0    Homeopathic Products (IRRITATED  EYE RELIEF OP) Apply to eye.      Irbesartan-hydroCHLOROthiazide (AVALIDE) 150-12.5 MG Oral Tab Take 1 tablet by mouth daily. (Patient not taking: Reported on 2025) 90 tablet 1    albuterol 108 (90 Base) MCG/ACT Inhalation Aero Soln Inhale 2 puffs into the lungs every 6 (six) hours as needed. inhale 2 puff by inhalation route  every 4 - 6 hours as needed (Patient not taking: Reported on 2024) 1 each 2     Allergies:Allergies[1]    Past Medical History:    Diverticulosis    Frequent UTI    Hemorrhoids    High cholesterol    HTN (hypertension)    Hyperlipidemia    Hypothyroidism    subclinical hypothyroidism    Macular degeneration    Morbid obesity with BMI of 50.0-59.9, adult (HCC)    Prediabetes    Pregnancy (HCC)    Per [employer]:  \" 2, Para 1, Miscarriage, 1 .\"    Supraventricular tachycardia (HCC)    Management:  AV node ablation     Vitamin B12 deficiency      Past Surgical History:   Procedure Laterality Date    Ablation      AV node ablation          Cholecystectomy      Colonoscopy N/A 2017    Procedure: COLONOSCOPY;  Surgeon: Vicente Bryant MD;  Location: [facility] ENDOSCOPY    Hemorrhoidectomy      Other surgical history      laparotomy- adhesiolysis of bowel      Family History   Problem Relation Age of Onset    Other (Other) Father         copd    Heart Disorder Father     Melanoma Father     Cancer Father         Basal Cell Cancer    Diabetes Mother     Heart Disorder Mother         Congestive Heart Failure    Renal Disease Mother     Other (Other) Mother     Ovarian Cancer Maternal Grandmother     Stroke Sister     Diabetes Sister     Obesity Sister     Other (Other) Brother         cirrhosis liver    Other (Other) Other         No Family h    Other (Other) Other         No Family h      Social History:   Social History     Socioeconomic History    Marital status:    Tobacco Use    Smoking status: Former     Current packs/day: 0.00     Average  packs/day: 1 pack/day for 30.0 years (30.0 ttl pk-yrs)     Types: Cigarettes     Start date: 1966     Quit date: 1996     Years since quittin.1     Passive exposure: Past    Smokeless tobacco: Never    Tobacco comments:     Quit at 42 y/o   Vaping Use    Vaping status: Never Used   Substance and Sexual Activity    Alcohol use: Yes     Alcohol/week: 0.0 standard drinks of alcohol     Comment: socially    Drug use: No   Other Topics Concern    Caffeine Concern Yes     Comment: Coffee, 2 cups per week     Exercise No     Social Drivers of Health     Food Insecurity: No Food Insecurity (2024)    Food Insecurity     Food Insecurity: Never true   Transportation Needs: No Transportation Needs (2024)    Transportation Needs     Lack of Transportation: No   Housing Stability: Low Risk  (2024)    Housing Stability     Housing Instability: No        Objective:   Physical Exam  Constitutional:       General: She is not in acute distress.     Appearance: She is obese. She is not ill-appearing, toxic-appearing or diaphoretic.   HENT:      Right Ear: Tympanic membrane, ear canal and external ear normal.      Left Ear: Tympanic membrane, ear canal and external ear normal.   Cardiovascular:      Rate and Rhythm: Normal rate and regular rhythm.      Heart sounds: Normal heart sounds. No murmur heard.  Pulmonary:      Effort: Pulmonary effort is normal. No respiratory distress.      Breath sounds: Normal breath sounds. No wheezing or rales.   Abdominal:      General: Bowel sounds are normal. There is no distension.      Palpations: Abdomen is soft.      Tenderness: There is no abdominal tenderness.   Musculoskeletal:      Cervical back: Normal range of motion and neck supple. No erythema, rigidity or tenderness.      Right lower leg: No edema.      Left lower leg: No edema.   Lymphadenopathy:      Cervical: No cervical adenopathy.   Skin:     Findings: No rash.   Neurological:      Mental Status: She is  alert.         Assessment & Plan:   (J02.9) Sore throat  (primary encounter diagnosis)  Plan: POC Rapid Strep [49673], SARS-CoV-2/Flu A and         B/RSV by PCR (Katarzyna) [E] *Collect in Office!        We did rapid strep and was negative. I told pt will check for covid/flu/rsv;  call back tomorrow for result. Go to ER if symptoms worsens/persist.     (J06.9) Upper respiratory tract infection, unspecified type  Plan: see above.        No orders of the defined types were placed in this encounter.      Meds This Visit:  Requested Prescriptions      No prescriptions requested or ordered in this encounter       Imaging & Referrals:  None            [1]   Allergies  Allergen Reactions    Ciprofloxacin HIVES      Labs

## 2025-02-21 NOTE — TELEPHONE ENCOUNTER
Discussed directly with Dr. Shore who states ok at 1:30 pm today.  Contacted patient (name and  of patient verified). Appointment scheduled:  2025  1:30 PM Christos Shore MD [facility]

## 2025-02-21 NOTE — TELEPHONE ENCOUNTER
Action Requested: Summary for Provider     []  Critical Lab, Recommendations Needed  [] Need Additional Advice  []   FYI    []   Need Orders  [] Need Medications Sent to Pharmacy  []  Other     SUMMARY: Per protocol disposition advised Go to Office Now. Available appointments offered to patient, patient declines and states she only wants to see Dr. Shore. She is far from office, earliest she could arrive is 1:30 pm    Dr. Golden, please advise on add on 1:30 pm or later. Thank you.    Reason for call: Acute  Onset: yesterday    Sinus pain and congestion, runny nose, throat pain, right ear pain, headache back of head   Denies: difficulty breathing, and protocol questions marked with \"no\"    Reason for Disposition   SEVERE sinus pain    Protocols used: Sinus Pain and Congestion-A-OH

## 2025-02-21 NOTE — TELEPHONE ENCOUNTER
Spoke to patient (name and  of patient verified). She reports she went on [facility] to check in for her appointment and noted it was scheduled for the wrong day.  Apologized for inconvenience and thanked patient for her diligence. Patient understanding of scheduling error.  Appointment rescheduled for today.  2025  1:30 PM Christos Shore MD [facility]

## 2025-02-22 ENCOUNTER — TELEPHONE (OUTPATIENT)
Dept: INTERNAL MEDICINE CLINIC | Facility: CLINIC | Age: 70
End: 2025-02-22

## 2025-02-22 LAB
FLUAV + FLUBV RNA SPEC NAA+PROBE: NOT DETECTED
FLUAV + FLUBV RNA SPEC NAA+PROBE: NOT DETECTED
RSV RNA SPEC NAA+PROBE: NOT DETECTED
SARS-COV-2 RNA RESP QL NAA+PROBE: NOT DETECTED

## 2025-02-22 RX ORDER — ERGOCALCIFEROL 1.25 MG/1
50000 CAPSULE ORAL WEEKLY
Qty: 12 CAPSULE | Refills: 0 | Status: SHIPPED | OUTPATIENT
Start: 2025-02-22 | End: 2025-05-11

## 2025-02-24 ENCOUNTER — TELEPHONE (OUTPATIENT)
Dept: INTERNAL MEDICINE CLINIC | Facility: CLINIC | Age: 70
End: 2025-02-24

## 2025-02-24 NOTE — TELEPHONE ENCOUNTER
Per visit notes:  Assessment & Plan:  (J02.9) Sore throat  (primary encounter diagnosis)  Plan: POC Rapid Strep [27210], SARS-CoV-2/Flu A and         B/RSV by PCR (Katarzyna) [E] *Collect in Office!        We did rapid strep and was negative. I told pt will check for covid/flu/rsv;  call back tomorrow for result. Go to ER if symptoms worsens/persist.      Patient states she was told if COVID/FLU was negative then you send an antibiotic. (Results were negative)     Verified preferred pharmacy and patient allergies.

## 2025-02-24 NOTE — TELEPHONE ENCOUNTER
If her symptoms are not getting better and persist, then we can start her on augmentin 875mg po bid for one week .

## 2025-02-24 NOTE — TELEPHONE ENCOUNTER
Medication pending your review and approval. Patient states that her symptoms are still persisting.

## 2025-03-31 ENCOUNTER — OFFICE VISIT (OUTPATIENT)
Dept: INTERNAL MEDICINE CLINIC | Facility: CLINIC | Age: 70
End: 2025-03-31
Payer: MEDICARE

## 2025-03-31 VITALS
BODY MASS INDEX: 50.61 KG/M2 | SYSTOLIC BLOOD PRESSURE: 126 MMHG | HEIGHT: 61 IN | HEART RATE: 69 BPM | DIASTOLIC BLOOD PRESSURE: 76 MMHG | WEIGHT: 268.06 LBS

## 2025-03-31 DIAGNOSIS — I70.0 ATHEROSCLEROSIS OF ABDOMINAL AORTA: ICD-10-CM

## 2025-03-31 DIAGNOSIS — R73.03 PREDIABETES: ICD-10-CM

## 2025-03-31 DIAGNOSIS — I44.2 AV BLOCK, 3RD DEGREE (HCC): ICD-10-CM

## 2025-03-31 DIAGNOSIS — Z12.31 ENCOUNTER FOR SCREENING MAMMOGRAM FOR BREAST CANCER: ICD-10-CM

## 2025-03-31 DIAGNOSIS — E55.9 VITAMIN D DEFICIENCY: ICD-10-CM

## 2025-03-31 DIAGNOSIS — E78.2 MIXED HYPERLIPIDEMIA: ICD-10-CM

## 2025-03-31 DIAGNOSIS — E53.8 VITAMIN B12 DEFICIENCY: ICD-10-CM

## 2025-03-31 DIAGNOSIS — Z12.11 COLON CANCER SCREENING: ICD-10-CM

## 2025-03-31 DIAGNOSIS — E03.8 SUBCLINICAL HYPOTHYROIDISM: ICD-10-CM

## 2025-03-31 DIAGNOSIS — Z00.00 MEDICARE ANNUAL WELLNESS VISIT, SUBSEQUENT: Primary | ICD-10-CM

## 2025-03-31 DIAGNOSIS — R91.1 PULMONARY NODULE: ICD-10-CM

## 2025-03-31 DIAGNOSIS — I10 PRIMARY HYPERTENSION: ICD-10-CM

## 2025-03-31 DIAGNOSIS — Z95.0 S/P PLACEMENT OF CARDIAC PACEMAKER: ICD-10-CM

## 2025-03-31 RX ORDER — TORSEMIDE 20 MG/1
20 TABLET ORAL DAILY
Qty: 90 TABLET | Refills: 1 | Status: SHIPPED | OUTPATIENT
Start: 2025-03-31

## 2025-03-31 NOTE — PROGRESS NOTES
Subjective:   Qing Miranda is a 70 year old female who presents for a Medicare Subsequent Annual Wellness visit (Pt already had Initial Annual Wellness) and scheduled follow up of multiple significant but stable problems.       History/Other:   Fall Risk Assessment:   She has been screened for Falls and is low risk.      Cognitive Assessment:   She had a completely normal cognitive assessment - see flowsheet entries     Functional Ability/Status:   Qing Miranda has some abnormal functions as listed below:  She has difficulties Affording Meds based on screening of functional status. She has Vision problems based on screening of functional status.       Depression Screening (PHQ):  PHQ-2 SCORE: 0  , done 3/31/2025            Advanced Directives:   She does NOT have a Living Will. [Do you have a living will?: (Patient-Rptd) No]  She does NOT have a Power of  for Health Care. [Do you have a healthcare power of ?: (Patient-Rptd) No]  Discussed Advance Care Planning with patient (and family/surrogate if present). Standard forms made available to patient in After Visit Summary.      Patient Active Problem List   Diagnosis    Encounter for screening mammogram for breast cancer    Mixed hyperlipidemia    Vitamin B12 deficiency    Subclinical hypothyroidism    Prediabetes    Vitamin D deficiency    Primary hypertension    Stress    Osteopenia of lumbar spine    Atherosclerosis of abdominal aorta    Heart block    AV block, 3rd degree (HCC)    AV heart block    S/P placement of cardiac pacemaker    Atherosclerosis of native coronary artery of native heart without angina pectoris    Pulmonary nodule     Allergies:  She is allergic to ciprofloxacin.    Current Medications:  Outpatient Medications Marked as Taking for the 3/31/25 encounter (Office Visit) with Christos Shore MD   Medication Sig    torsemide 20 MG Oral Tab Take 1 tablet (20 mg total) by mouth daily.    ergocalciferol  (DRISDOL) 1.25 MG (22797 UT) Oral Cap Take 1 capsule (50,000 Units total) by mouth once a week for 12 doses.    Potassium Chloride ER 10 MEQ Oral Tab CR Take 1 tablet (10 mEq total) by mouth daily as needed.    Irbesartan-hydroCHLOROthiazide (AVALIDE) 150-12.5 MG Oral Tab Take 1 tablet by mouth daily.    fluticasone furoate-vilanterol (BREO ELLIPTA) 100-25 MCG/ACT Inhalation Aerosol Powder, Breath Activated Inhale 1 puff into the lungs daily. Rinse your mouth with water without swallowing after using BREO to help reduce your chance of getting thrush.    cyanocobalamin 1000 MCG/ML Injection Solution INJECT 1 ML INTO THE MUSCLE EVERY 15 DAYS    Syringe/Needle, Disp, (BD LUER-MARGARITO SYRINGE) 25G X 1\" 3 ML Does not apply Misc USE TWICE A MONTH AS DIRECTED    atorvastatin 20 MG Oral Tab Take 1 tablet (20 mg total) by mouth nightly.    Homeopathic Products (IRRITATED EYE RELIEF OP) Apply to eye.       Medical History:  She  has a past medical history of Diverticulosis, Frequent UTI, Hemorrhoids, High cholesterol, HTN (hypertension), Hyperlipidemia, Hypothyroidism, Macular degeneration, Morbid obesity with BMI of 50.0-59.9, adult (HCC), Prediabetes, Pregnancy (HCC), Supraventricular tachycardia (HCC) (), and Vitamin B12 deficiency.  Surgical History:  She  has a past surgical history that includes  (); cholecystectomy (); hemorrhoidectomy; other surgical history; ablation; colonoscopy (N/A, 2017); ep dual chamber pacemaker; and sinus surgery   (Left).   Family History:  Her family history includes Cancer in her father; Diabetes in her mother and sister; Heart Disorder in her father and mother; Melanoma in her father; Obesity in her sister; Other in her brother, father, mother, and other family members; Ovarian Cancer in her maternal grandmother; Renal Disease in her mother; Stroke in her sister.  Social History:  She  reports that she quit smoking about 29 years ago. Her smoking use included  cigarettes. She started smoking about 59 years ago. She has a 30 pack-year smoking history. She has been exposed to tobacco smoke. She has never used smokeless tobacco. She reports current alcohol use. She reports that she does not use drugs.    Tobacco:  She smoked tobacco in the past but quit greater than 12 months ago.  Social History     Tobacco Use   Smoking Status Former    Current packs/day: 0.00    Average packs/day: 1 pack/day for 30.0 years (30.0 ttl pk-yrs)    Types: Cigarettes    Start date: 1966    Quit date: 1996    Years since quittin.2    Passive exposure: Past   Smokeless Tobacco Never   Tobacco Comments    Quit at 42 y/o        CAGE Alcohol Screen:         Patient Care Team:  Christos Shore MD as PCP - General (Internal Medicine)  Alice Monsalve MD as Consulting Physician (NEUROLOGY)  Oneal Mckenna MD as Consulting Physician (BARIATRICS)  Paige Mcgill MD (OBSTETRICS & GYNECOLOGY)    Review of Systems  GENERAL: feels well otherwise  SKIN: denies any unusual skin lesions  EYES: denies blurred vision or double vision  HEENT: denies nasal congestion, sinus pain or ST  LUNGS: denies shortness of breath with exertion;   CARDIOVASCULAR: denies chest pain on exertion  GI: denies abdominal pain, denies heartburn;  no hematochezia  : denies dysuria, vaginal discharge or itching, no complaint of urinary incontinence; no hemtuuris  NEURO: denies headaches  PSYCHE: denies depression or anxiety  HEMATOLOGIC: denies hx of anemia  ENDOCRINE: denies thyroid history  ALL/ASTHMA: denies hx of allergy or asthma    Objective:   Physical Exam  General Appearance:  Alert, cooperative, no distress, appears stated age   Head:  Normocephalic, without obvious abnormality, atraumatic   Eyes:  PERRL, conjunctiva/corneas clear, EOM's intact both eyes   Ears:  Normal TM's and external ear canals, both ears   Nose: Nares normal, septum midline,mucosa normal, no drainage or sinus tenderness   Throat: Lips,  mucosa, and tongue normal; teeth and gums normal   Neck: Supple, symmetrical, trachea midline, no adenopathy;  thyroid: not enlarged, symmetric, no tenderness/mass/nodules; no carotid bruit or JVD   Back:   Symmetric, no curvature, ROM normal, no CVA tenderness   Lungs:   Clear to auscultation bilaterally, respirations unlabored   Heart:  Regular rate and rhythm, S1 and S2 normal, no murmur, rub, or gallop   Abdomen:   Soft, non-tender, bowel sounds active all four quadrants,  no masses, no organomegaly   Pelvic: Deferred   Extremities: Extremities normal, atraumatic, no cyanosis or edema   Pulses: 2+ and symmetric   Skin: Skin color, texture, turgor normal, no rashes or lesions   Lymph nodes: Cervical, supraclavicular, nodes normal   Neurologic: Normal       /76   Pulse 69   Ht 5' 1\" (1.549 m)   Wt 268 lb 1 oz (121.6 kg)   BMI 50.65 kg/m²  Estimated body mass index is 50.65 kg/m² as calculated from the following:    Height as of this encounter: 5' 1\" (1.549 m).    Weight as of this encounter: 268 lb 1 oz (121.6 kg).    Medicare Hearing Assessment:   Hearing Screening    Screening Method: Finger Rub  Finger Rub Result: Pass         Visual Acuity:   Right Eye Visual Acuity: Uncorrected Right Eye Chart Acuity: 20/30   Left Eye Visual Acuity: Uncorrected Left Eye Chart Acuity: 20/13   Both Eyes Visual Acuity: Uncorrected Both Eyes Chart Acuity: 20/15   Able To Tolerate Visual Acuity: Yes        Assessment & Plan:   Qing Miranda is a 70 year old female who presents for a Medicare Assessment.     (Z00.00) Medicare annual wellness visit, subsequent  (primary encounter diagnosis)  Plan: She already had her labs done and were reviewed with the patient.  Advised to get her COVID booster.  Already had her flu shot.    (I10) Primary hypertension  Plan: Basic Metabolic Panel (8)        Blood pressure controlled.  CPM.    (E78.2) Mixed hyperlipidemia  Plan: Controlled with current statin therapy.   CPM.    (I44.2) AV block, 3rd degree (HCC)  Plan: Previous history of third-degree AV block status post cardiac pacemaker.  She continues to be followed by cardiology.    (Z95.0) S/P placement of cardiac pacemaker  Plan: See above.        (R73.03) Prediabetes  Plan: Stable and will continue to manage with diet with low-carb.  Work on losing weight.    (E03.8) Subclinical hypothyroidism  Plan: Her TSH remains below 10 so we will continue to observe no indication for levothyroxine treatment at this time.    (I70.0) Atherosclerosis of abdominal aorta  Plan: Patient ready on statin therapy.    (R91.1) Pulmonary nodule  Plan: This is being followed by her pulmonologist.  She is due for a follow-up CT scan by June 2025.    (E55.9) Vitamin D deficiency  Plan: Continue vitamin D supplement.    (E53.8) Vitamin B12 deficiency  Plan: Continue vitamin B12 supplement.    (Z12.11) Colon cancer screening  Plan: Patient current with her colonoscopy.    (Z12.31) Encounter for screening mammogram for breast cancer  Plan: [facility] BRYAN 2D+3D SCREENING BILAT         (CPT=77067/39255)        Mammogram ordered.     The patient indicates understanding of these issues and agrees to the plan.  Reinforced healthy diet, lifestyle, and exercise.      No follow-ups on file.     Christos Shore MD, 3/31/2025     Supplementary Documentation:   General Health:  In the past six months, have you lost more than 10 pounds without trying?: (Patient-Rptd) 3 - Don't know  Has your appetite been poor?: (Patient-Rptd) No  Type of Diet: (Patient-Rptd) Balanced  How does the patient maintain a good energy level?: (Patient-Rptd) Stretching  How would you describe your daily physical activity?: (Patient-Rptd) Moderate  How would you describe your current health state?: (Patient-Rptd) Fair  How do you maintain positive mental well-being?: (Patient-Rptd) Social Interaction, Puzzles, Games, Visiting Friends, Visiting Family  On a scale of 0 to 10, with 0 being no  pain and 10 being severe pain, what is your pain level?: (Patient-Rptd) 3 - (Mild)  In the past six months, have you experienced urine leakage?: (Patient-Rptd) 1-Yes  At any time do you feel concerned for the safety/well-being of yourself and/or your children, in your home or elsewhere?: (Patient-Rptd) No  Have you had any immunizations at another office such as Influenza, Hepatitis B, Tetanus, or Pneumococcal?: (Patient-Rptd) No    Health Maintenance   Topic Date Due    Mammogram  06/01/2024    COVID-19 Vaccine (7 - 2024-25 season) 09/01/2024    HTN: BP Follow-Up  04/30/2025    Annual Physical  03/31/2026    Colorectal Cancer Screening  11/13/2027    Influenza Vaccine  Completed    DEXA Scan  Completed    Annual Depression Screening  Completed    Fall Risk Screening (Annual)  Completed    Pneumococcal Vaccine: 50+ Years  Completed    Zoster Vaccines  Completed    Meningococcal B Vaccine  Aged Out

## 2025-04-02 PROBLEM — I44.2 AV BLOCK, 3RD DEGREE (HCC): Status: ACTIVE | Noted: 2024-11-08

## 2025-04-02 PROBLEM — R91.1 PULMONARY NODULE: Status: ACTIVE | Noted: 2025-04-02

## 2025-04-02 PROBLEM — I25.10 ATHEROSCLEROSIS OF NATIVE CORONARY ARTERY OF NATIVE HEART WITHOUT ANGINA PECTORIS: Status: ACTIVE | Noted: 2025-04-02

## 2025-04-02 PROBLEM — Z95.0 S/P PLACEMENT OF CARDIAC PACEMAKER: Status: ACTIVE | Noted: 2025-04-02

## 2025-04-02 PROBLEM — J44.9 CHRONIC OBSTRUCTIVE PULMONARY DISEASE (HCC): Status: RESOLVED | Noted: 2024-11-15 | Resolved: 2025-04-02

## 2025-04-16 ENCOUNTER — LAB ENCOUNTER (OUTPATIENT)
Dept: LAB | Facility: HOSPITAL | Age: 70
End: 2025-04-16
Attending: INTERNAL MEDICINE
Payer: MEDICARE

## 2025-04-16 DIAGNOSIS — I10 PRIMARY HYPERTENSION: ICD-10-CM

## 2025-04-16 LAB
ANION GAP SERPL CALC-SCNC: 7 MMOL/L (ref 0–18)
BUN BLD-MCNC: 14 MG/DL (ref 9–23)
BUN/CREAT SERPL: 17.5 (ref 10–20)
CALCIUM BLD-MCNC: 9.2 MG/DL (ref 8.7–10.4)
CHLORIDE SERPL-SCNC: 100 MMOL/L (ref 98–112)
CO2 SERPL-SCNC: 34 MMOL/L (ref 21–32)
CREAT BLD-MCNC: 0.8 MG/DL (ref 0.55–1.02)
EGFRCR SERPLBLD CKD-EPI 2021: 80 ML/MIN/1.73M2 (ref 60–?)
FASTING STATUS PATIENT QL REPORTED: YES
GLUCOSE BLD-MCNC: 125 MG/DL (ref 70–99)
OSMOLALITY SERPL CALC.SUM OF ELEC: 294 MOSM/KG (ref 275–295)
POTASSIUM SERPL-SCNC: 3.4 MMOL/L (ref 3.5–5.1)
SODIUM SERPL-SCNC: 141 MMOL/L (ref 136–145)

## 2025-04-16 PROCEDURE — 80048 BASIC METABOLIC PNL TOTAL CA: CPT

## 2025-04-16 PROCEDURE — 36415 COLL VENOUS BLD VENIPUNCTURE: CPT

## 2025-04-17 ENCOUNTER — NURSE TRIAGE (OUTPATIENT)
Dept: INTERNAL MEDICINE CLINIC | Facility: CLINIC | Age: 70
End: 2025-04-17

## 2025-04-17 ENCOUNTER — TELEPHONE (OUTPATIENT)
Dept: INTERNAL MEDICINE CLINIC | Facility: CLINIC | Age: 70
End: 2025-04-17

## 2025-04-17 DIAGNOSIS — E87.6 HYPOKALEMIA: Primary | ICD-10-CM

## 2025-04-17 RX ORDER — CEPHALEXIN 500 MG/1
500 CAPSULE ORAL 3 TIMES DAILY
Qty: 21 CAPSULE | Refills: 0 | Status: SHIPPED | OUTPATIENT
Start: 2025-04-17

## 2025-04-17 NOTE — TELEPHONE ENCOUNTER
Spoke to patient (verified Name and ) and relayed provider's message below, verbalized understanding. No appointment in [place]. Offered available appointments tomorrow in other locations, however, she is unable to come to the office until 4:00 pm. States she has to go to work and cannot afford to lose another day.     Dr. Shore will it be appropriate for her to just go to [facility]mediate [facility] for evaluation?

## 2025-04-17 NOTE — TELEPHONE ENCOUNTER
Will give her keflex 500mg po TID #21; need to see be seen tomorrow to check arm ; go to ER if worsens .

## 2025-04-17 NOTE — TELEPHONE ENCOUNTER
Action Requested: Summary for Provider     []  Critical Lab, Recommendations Needed  [] Need Additional Advice  []   FYI    []   Need Orders  [] Need Medications Sent to Pharmacy  []  Other     SUMMARY: Per protocol disposition advised Nurse to Determine Disposition: CALLBACK BY PCP TODAY.    Dr. Shore, patient has itching/pain at IV site from yesterdays PET scan. Pain is now radiating up arm to elbow. Had similar symptoms in past and reports an antibiotic was prescribed. Please advise on antibiotic to pharmacy or other recommendations. Thank you.    Reason for call: Acute  Onset: 10 am today    Had PET scan 8 am yesterday  Around 10 am this morning, pain developed in right hand where IV was discontinued   Slight swelling in right hand (improved some), itching, pain shooting up to elbow, worsening pain with movement  Has used ice on area, keeping arm elevated  Has had this in past, Dr. Shore prescribed antibiotic. Patient's preferred pharmacy verified.    Reason for Disposition   Pain at IV site or shooting up arm and NO redness or swelling and IV running normally or has been removed    Protocols used: IV Site and Other Symptoms-A-OH

## 2025-04-17 NOTE — TELEPHONE ENCOUNTER
Advised patient of Dr Shore's note. Patient declined going to urgent care as she had a bad experience there. She states she will call if her symptoms do not improve. Routing as FYI.

## 2025-05-12 NOTE — H&P
Qing Miranda is a 70 year old female with JAY, SVT, AVB s/p PPM referred for coronary angiogram with possible intervention and RHC indicated for SMITH, CP and abnormal stress test. H&P reviewed and there are no significant changes. Indications, risks, benefits and alternate options reviewed with the patient and they are agreeable to proceed with the procedure.    Mallampati 3  ASA 3    Dr. Sony Leavitt

## 2025-05-13 ENCOUNTER — HOSPITAL ENCOUNTER (OUTPATIENT)
Dept: GENERAL RADIOLOGY | Facility: HOSPITAL | Age: 70
Discharge: HOME | End: 2025-05-13
Attending: INTERNAL MEDICINE
Payer: MEDICARE

## 2025-05-13 ENCOUNTER — LAB ENCOUNTER (OUTPATIENT)
Dept: LAB | Facility: HOSPITAL | Age: 70
End: 2025-05-13
Attending: INTERNAL MEDICINE
Payer: MEDICARE

## 2025-05-13 DIAGNOSIS — Z01.818 PRE-OP TESTING: ICD-10-CM

## 2025-05-13 DIAGNOSIS — Z01.818 PRE-OP TESTING: Primary | ICD-10-CM

## 2025-05-13 LAB
ANION GAP SERPL CALC-SCNC: 8 MMOL/L (ref 0–18)
BASOPHILS # BLD AUTO: 0.04 X10(3) UL (ref 0–0.2)
BASOPHILS NFR BLD AUTO: 0.4 %
BUN BLD-MCNC: 19 MG/DL (ref 9–23)
BUN/CREAT SERPL: 18.3 (ref 10–20)
CALCIUM BLD-MCNC: 9 MG/DL (ref 8.7–10.4)
CHLORIDE SERPL-SCNC: 101 MMOL/L (ref 98–112)
CO2 SERPL-SCNC: 31 MMOL/L (ref 21–32)
CREAT BLD-MCNC: 1.04 MG/DL (ref 0.55–1.02)
DEPRECATED RDW RBC AUTO: 42.2 FL (ref 35.1–46.3)
EGFRCR SERPLBLD CKD-EPI 2021: 58 ML/MIN/1.73M2 (ref 60–?)
EOSINOPHIL # BLD AUTO: 0.27 X10(3) UL (ref 0–0.7)
EOSINOPHIL NFR BLD AUTO: 2.4 %
ERYTHROCYTE [DISTWIDTH] IN BLOOD BY AUTOMATED COUNT: 13.4 % (ref 11–15)
FASTING STATUS PATIENT QL REPORTED: NO
GLUCOSE BLD-MCNC: 119 MG/DL (ref 70–99)
HCT VFR BLD AUTO: 42.1 % (ref 35–48)
HGB BLD-MCNC: 13.5 G/DL (ref 12–16)
IMM GRANULOCYTES # BLD AUTO: 0.04 X10(3) UL (ref 0–1)
IMM GRANULOCYTES NFR BLD: 0.4 %
LYMPHOCYTES # BLD AUTO: 2.11 X10(3) UL (ref 1–4)
LYMPHOCYTES NFR BLD AUTO: 19 %
MCH RBC QN AUTO: 27.6 PG (ref 26–34)
MCHC RBC AUTO-ENTMCNC: 32.1 G/DL (ref 31–37)
MCV RBC AUTO: 86.1 FL (ref 80–100)
MONOCYTES # BLD AUTO: 0.64 X10(3) UL (ref 0.1–1)
MONOCYTES NFR BLD AUTO: 5.8 %
NEUTROPHILS # BLD AUTO: 8 X10 (3) UL (ref 1.5–7.7)
NEUTROPHILS # BLD AUTO: 8 X10(3) UL (ref 1.5–7.7)
NEUTROPHILS NFR BLD AUTO: 72 %
OSMOLALITY SERPL CALC.SUM OF ELEC: 293 MOSM/KG (ref 275–295)
PLATELET # BLD AUTO: 354 10(3)UL (ref 150–450)
POTASSIUM SERPL-SCNC: 3.5 MMOL/L (ref 3.5–5.1)
RBC # BLD AUTO: 4.89 X10(6)UL (ref 3.8–5.3)
SODIUM SERPL-SCNC: 140 MMOL/L (ref 136–145)
WBC # BLD AUTO: 11.1 X10(3) UL (ref 4–11)

## 2025-05-13 PROCEDURE — 71046 X-RAY EXAM CHEST 2 VIEWS: CPT | Performed by: INTERNAL MEDICINE

## 2025-05-13 PROCEDURE — 85025 COMPLETE CBC W/AUTO DIFF WBC: CPT

## 2025-05-13 PROCEDURE — 80048 BASIC METABOLIC PNL TOTAL CA: CPT

## 2025-05-13 PROCEDURE — 36415 COLL VENOUS BLD VENIPUNCTURE: CPT

## 2025-05-14 ENCOUNTER — HOSPITAL ENCOUNTER (OUTPATIENT)
Dept: INTERVENTIONAL RADIOLOGY/VASCULAR | Facility: HOSPITAL | Age: 70
Discharge: HOME | End: 2025-05-14
Attending: INTERNAL MEDICINE | Admitting: INTERNAL MEDICINE
Payer: MEDICARE

## 2025-05-14 VITALS
RESPIRATION RATE: 13 BRPM | HEIGHT: 61 IN | OXYGEN SATURATION: 93 % | TEMPERATURE: 98 F | WEIGHT: 270 LBS | HEART RATE: 67 BPM | SYSTOLIC BLOOD PRESSURE: 108 MMHG | DIASTOLIC BLOOD PRESSURE: 42 MMHG | BODY MASS INDEX: 50.98 KG/M2

## 2025-05-14 DIAGNOSIS — I20.0 UNSTABLE ANGINA (HCC): ICD-10-CM

## 2025-05-14 DIAGNOSIS — R07.9 CHEST PAIN: ICD-10-CM

## 2025-05-14 DIAGNOSIS — R94.39 ABNORMAL NUCLEAR STRESS TEST: ICD-10-CM

## 2025-05-14 LAB
AVOX TOTAL HEMOGLOBIN CONCENTRATION: 11.1 G/DL (ref 12–16)
AVOX TOTAL HEMOGLOBIN CONCENTRATION: 11.8 G/DL (ref 12–16)
COHGB MFR BLD: 64.2 % (ref 65–80)
COHGB MFR BLD: 89.7 % (ref 95–100)

## 2025-05-14 PROCEDURE — 36415 COLL VENOUS BLD VENIPUNCTURE: CPT

## 2025-05-14 PROCEDURE — 93460 R&L HRT ART/VENTRICLE ANGIO: CPT | Performed by: INTERNAL MEDICINE

## 2025-05-14 PROCEDURE — 93458 L HRT ARTERY/VENTRICLE ANGIO: CPT | Performed by: INTERNAL MEDICINE

## 2025-05-14 PROCEDURE — 99152 MOD SED SAME PHYS/QHP 5/>YRS: CPT | Performed by: INTERNAL MEDICINE

## 2025-05-14 PROCEDURE — 99153 MOD SED SAME PHYS/QHP EA: CPT | Performed by: INTERNAL MEDICINE

## 2025-05-14 RX ORDER — SODIUM CHLORIDE 9 MG/ML
3 INJECTION, SOLUTION INTRAVENOUS
Status: COMPLETED | OUTPATIENT
Start: 2025-05-14 | End: 2025-05-14

## 2025-05-14 RX ORDER — CHLORHEXIDINE GLUCONATE 40 MG/ML
SOLUTION TOPICAL
Status: DISCONTINUED | OUTPATIENT
Start: 2025-05-14 | End: 2025-05-14

## 2025-05-14 RX ORDER — IOPAMIDOL 612 MG/ML
80 INJECTION, SOLUTION INTRAVASCULAR
Status: COMPLETED | OUTPATIENT
Start: 2025-05-14 | End: 2025-05-14

## 2025-05-14 RX ORDER — ASPIRIN 81 MG/1
324 TABLET, CHEWABLE ORAL ONCE
Status: DISCONTINUED | OUTPATIENT
Start: 2025-05-14 | End: 2025-05-14

## 2025-05-14 RX ORDER — LIDOCAINE HYDROCHLORIDE 20 MG/ML
INJECTION, SOLUTION INFILTRATION; PERINEURAL
Status: COMPLETED
Start: 2025-05-14 | End: 2025-05-14

## 2025-05-14 RX ORDER — HEPARIN SODIUM 1000 [USP'U]/ML
INJECTION, SOLUTION INTRAVENOUS; SUBCUTANEOUS
Status: COMPLETED
Start: 2025-05-14 | End: 2025-05-14

## 2025-05-14 RX ADMIN — IOPAMIDOL 80 ML: 612 INJECTION, SOLUTION INTRAVASCULAR at 12:10:00

## 2025-05-14 RX ADMIN — SODIUM CHLORIDE 3 ML/KG/HR: 9 INJECTION, SOLUTION INTRAVENOUS at 09:45:00

## 2025-05-14 NOTE — DISCHARGE INSTRUCTIONS
TransRadial Angiogram Discharge Instructions    The following instructions are for patients who have had an angiogram, cardiac cath, angioplasty, or stent through the radial artery.    Proper skin puncture site care is important during the healing period. This guideline should help to remind you of the verbal instructions you received from your physician or nurse.      Site Care:     For 5 days after the procedure, make sure wrist is not submerged in water or any liquid   Leave bandage in place for 24 hours. Remove on May 15 after 3 pm.   Wash gently with soap and water. Discontinue using the surgical soap.   Do not put any other bandage, ointment, powders, or creams to site.     For local swelling: apply ice   If bleeding occurs: Elevate hand above heart and apply local pressure      Activity/Driving     Avoid wrist flexion, extension, and fine motor activities (i.e. texting, typing, using computer mouse, etc.) for 24 hours   Do not drive for 24 hours   Do not lift or pull anything heavier than 5 pounds with affected hand for 1 week    When to contact physician:Call Dr. Leavitt   at  [phone]   right away if you experience     Swelling, pain, or bleeding at the site that is not relieved by applying ice or pressure   Signs of infection: Redness, warmth, drainage at the site, chills, or temperature of 100.5 or greater   Changes in sensation, numbness, or tingling of affected hand

## 2025-05-14 NOTE — PROCEDURES
[facility]  part of [facility] Cardiac Cath Procedure Note  Qing Miranda Patient Status:  Outpatient    1955 MRN [id number]   Location [facility] INTERVENTIONAL SUITES Attending Sony Leavitt DO   Hosp Day # 0 PCP Christos Shore MD       Proceduralist: Sony Leavitt DO  Date of Procedure: 2025     Preoperative Diagnosis:  1) Exertional chest tightness, shortness of breath  2) Abnormal stress test with transient ischemic dilation    Postoperative Diagnosis:  1) False positive stress test, normal coronaries  2) Normal right heart catheterization    Procedures Performed:  1) Selective Left and Right Coronary angiogram  2) Left Heart Catheterization  3) right heart catheterization    Indication/History: Qing Miranda is a 70 year old female with history of sleep apnea, SVT, AV block status post PPM presenting with exertional shortness of breath and chest tightness, abnormal stress test with transient ischemic dilation.  She has been referred for coronary catheterization with possible PCI, right heart catheterization.    Summary of Case: After written informed consent was obtained from the patient, patient was brought to the cardiac catheterization laboratory.  Patient was prepped and draped in the usual sterile fashion. Lidocaine 1% was used to infiltrate the right wrist and upper arm for local anesthesia and a 6 Honduran introducer sheath was inserted into the right radial artery using modified Seldinger technique and right basilic vein using ultrasound guidance using 4/5 Honduran sheath.      Selective coronary angiography performed with 6 Honduran 3D RCA catheter for RCA and 6 Honduran JL 3.5 catheter for LCA.  Angiography performed in standard projections.  6 Honduran JR4 catheter used to cross the aortic valve and left heart catheterization was performed.     Findings:  Coronaries:  Left main coronary artery: Large size vessel with no  angiographically significant disease.  Left anterior descending artery:  medium size, Type IV vessel with no angiographically significant disease.  Circumflex artery: Large size dominant vessel with no angiographically significant disease.  Right coronary artery: Small size nondominant vessel with no angiographically significant disease.     RIGHT HEART CATHETERIZATION HEMODYNAMICS:  Right Atrial Pressure: 10/7/5 mmHg  Right Ventricular Pressure: 35/3/10 mmHg  Pulmonary Artery Pressure: 34/10/20 mHg  Wedge Pressure: 10/7/4 mmHg  Brittanie CO: 6.8 L/min; Brittanie CI: 3.1 L/min/m²  Transpulmonary Gradient (PAmean - Wedge): 16 mmHg  Pulmonary Vascular Resistance (PA-wedge/CO): 2.4 DE  Ao saturation 90%  PA saturation 64%  Hgb 11.8  Heart Rate 62    Specimen sent to: No specimen collected  Estimated blood loss: 10cc  Closure: TR band    IV was maintained by RN and moderate conscious sedation of versed 2 mg and fentanyl 25 mcg were given.  Patient was assessed and monitoring of oxygen, heart rate and blood pressure by nurse and myself during the exam from  1135 to 1203.      Assessment and Plan:  70-year-old female with above history referred for coronary catheterization, right heart catheterization for exertional shortness of breath, occasional chest tightness and abnormal stress test showing transient ischemic dilation.  Today's study is normal, no significant coronary disease and normal right heart catheterization.    Sony Leavitt DO  [facility]   Interventional Cardiac and Vascular Services      Sony Leavitt DO  05/14/25

## 2025-05-15 ENCOUNTER — HOSPITAL ENCOUNTER (OUTPATIENT)
Dept: MAMMOGRAPHY | Facility: HOSPITAL | Age: 70
Discharge: HOME | End: 2025-05-15
Attending: INTERNAL MEDICINE
Payer: MEDICARE

## 2025-05-15 DIAGNOSIS — Z12.31 ENCOUNTER FOR SCREENING MAMMOGRAM FOR BREAST CANCER: ICD-10-CM

## 2025-05-15 PROCEDURE — 77063 BREAST TOMOSYNTHESIS BI: CPT | Performed by: INTERNAL MEDICINE

## 2025-05-15 PROCEDURE — 77067 SCR MAMMO BI INCL CAD: CPT | Performed by: INTERNAL MEDICINE

## 2025-05-21 ENCOUNTER — HOSPITAL ENCOUNTER (OUTPATIENT)
Dept: CT IMAGING | Facility: HOSPITAL | Age: 70
Discharge: HOME | End: 2025-05-21
Attending: INTERNAL MEDICINE
Payer: MEDICARE

## 2025-05-21 DIAGNOSIS — R91.8 LUNG NODULES: ICD-10-CM

## 2025-05-21 PROCEDURE — 71250 CT THORAX DX C-: CPT | Performed by: INTERNAL MEDICINE

## 2025-05-22 ENCOUNTER — NURSE TRIAGE (OUTPATIENT)
Dept: INTERNAL MEDICINE CLINIC | Facility: CLINIC | Age: 70
End: 2025-05-22

## 2025-05-22 DIAGNOSIS — R30.0 DYSURIA: Primary | ICD-10-CM

## 2025-05-22 NOTE — TELEPHONE ENCOUNTER
Action Requested: Summary for Provider     []  Critical Lab, Recommendations Needed  [] Need Additional Advice  []   FYI    []   Need Orders  [] Need Medications Sent to Pharmacy  []  Other     SUMMARY: Requesting sooner appointment then June 11 at 2 pm done via [employer].     Patient feeling more fatigued for one week. Feels it maybe associated to urinary tract infection. When she gets the urinary infection has no burning, hematuria, nor pain. Denies fever or flank pain. Note: she had an angiogram one week ago and staff informed her her WBC was elevated 8.1.     I offered several appointments at different office locations, but patient stated she works today until 6 pm and lives far in [place], [place]. She decided will call walk-in clinics near her or go to [facility] [place] for treatment. Will keep current appointment for follow up.    Tasked to Dr Mone [place] I pended order for urine w/culture if appropriate. Patient was willing to wait for order too.    Please reply to pool: EM RN TRIAGE      Reason for call: Urinary Symptoms  Onset: Data Unavailable      General Assessment (questions to ask the caller)  Do you consider this a medical emergency?: No  Can you access 911?: Yes  Do you have any pain?: No  Do you have a fever?: No  What is the date of your last medical exam?: 03/31/25 (Patient seen Dr Lion in office.)  Additional Assessments  Associated Symptoms:  (Patient feeling fatigued and maybe signs of urinary infection for patient. No other symptoms to report.)  Are these symptoms new, recurrent, or chronic?: new  Precipitated by: no precipitating factors  Aggravated by: no aggravating factors  Alleviated by: nothing alleviates complaint                 Reason for Disposition   Patient wants to be seen    Protocols used: Urinary Symptoms-A-OH

## 2025-05-22 NOTE — TELEPHONE ENCOUNTER
Spoke with patient, (  Name and date of birth verified ) informed of Dr. Shore's instructions to be seen today     Patient found Chris MIRAN[facility] in [place] , will go after work

## 2025-05-23 ENCOUNTER — HOSPITAL ENCOUNTER (OUTPATIENT)
Age: 70
Discharge: HOME | End: 2025-05-23
Payer: MEDICARE

## 2025-05-23 VITALS
SYSTOLIC BLOOD PRESSURE: 128 MMHG | OXYGEN SATURATION: 96 % | TEMPERATURE: 98 F | RESPIRATION RATE: 18 BRPM | DIASTOLIC BLOOD PRESSURE: 65 MMHG | HEART RATE: 64 BPM

## 2025-05-23 DIAGNOSIS — R53.83 FATIGUE, UNSPECIFIED TYPE: Primary | ICD-10-CM

## 2025-05-23 LAB
#MXD IC: 0.4 X10ˆ3/UL (ref 0.1–1)
BILIRUB UR QL STRIP: NEGATIVE
BUN BLD-MCNC: 20 MG/DL (ref 7–18)
CHLORIDE BLD-SCNC: 100 MMOL/L (ref 98–112)
CLARITY UR: CLEAR
CO2 BLD-SCNC: 27 MMOL/L (ref 21–32)
COLOR UR: YELLOW
CREAT BLD-MCNC: 0.8 MG/DL (ref 0.55–1.02)
EGFRCR SERPLBLD CKD-EPI 2021: 80 ML/MIN/1.73M2 (ref 60–?)
GLUCOSE BLD-MCNC: 100 MG/DL (ref 70–99)
GLUCOSE UR STRIP-MCNC: NEGATIVE MG/DL
HCT VFR BLD AUTO: 40.2 % (ref 35–48)
HCT VFR BLD CALC: 43 % (ref 34–50)
HGB BLD-MCNC: 12.9 G/DL (ref 12–16)
HGB UR QL STRIP: NEGATIVE
ISTAT IONIZED CALCIUM FOR CHEM 8: 1.18 MMOL/L (ref 1.12–1.32)
KETONES UR STRIP-MCNC: NEGATIVE MG/DL
LYMPHOCYTES # BLD AUTO: 2.5 X10ˆ3/UL (ref 1–4)
LYMPHOCYTES NFR BLD AUTO: 30.9 %
MCH RBC QN AUTO: 27.7 PG (ref 26–34)
MCHC RBC AUTO-ENTMCNC: 32.1 G/DL (ref 31–37)
MCV RBC AUTO: 86.3 FL (ref 80–100)
MIXED CELL %: 5.4 %
NEUTROPHILS # BLD AUTO: 5.2 X10ˆ3/UL (ref 1.5–7.7)
NEUTROPHILS NFR BLD AUTO: 63.7 %
NITRITE UR QL STRIP: NEGATIVE
PH UR STRIP: 5 [PH]
PLATELET # BLD AUTO: 346 X10ˆ3/UL (ref 150–450)
POCT INFLUENZA A: NEGATIVE
POCT INFLUENZA B: NEGATIVE
POTASSIUM BLD-SCNC: 3.8 MMOL/L (ref 3.6–5.1)
PROT UR STRIP-MCNC: NEGATIVE MG/DL
RBC # BLD AUTO: 4.66 X10ˆ6/UL (ref 3.8–5.3)
SARS-COV-2 RNA RESP QL NAA+PROBE: NOT DETECTED
SODIUM BLD-SCNC: 140 MMOL/L (ref 136–145)
SP GR UR STRIP: 1.02
UROBILINOGEN UR STRIP-ACNC: <2 MG/DL
WBC # BLD AUTO: 8.1 X10ˆ3/UL (ref 4–11)

## 2025-05-23 PROCEDURE — 87502 INFLUENZA DNA AMP PROBE: CPT | Performed by: PHYSICIAN ASSISTANT

## 2025-05-23 PROCEDURE — 81002 URINALYSIS NONAUTO W/O SCOPE: CPT | Performed by: PHYSICIAN ASSISTANT

## 2025-05-23 PROCEDURE — 80047 BASIC METABLC PNL IONIZED CA: CPT | Performed by: PHYSICIAN ASSISTANT

## 2025-05-23 PROCEDURE — 85025 COMPLETE CBC W/AUTO DIFF WBC: CPT | Performed by: PHYSICIAN ASSISTANT

## 2025-05-23 PROCEDURE — 99214 OFFICE O/P EST MOD 30 MIN: CPT | Performed by: PHYSICIAN ASSISTANT

## 2025-05-23 PROCEDURE — U0002 COVID-19 LAB TEST NON-CDC: HCPCS | Performed by: PHYSICIAN ASSISTANT

## 2025-05-23 NOTE — DISCHARGE INSTRUCTIONS
Urine culture results in 1-2 days   Drink plenty of water and get plenty of rest   Follow up with your primary care provider     If you experience severe or worsening symptoms, chest pain, shortness of breath, fevers, abdominal pain, vomiting, flank pain, dizziness, or any other concerning symptoms, go to nearest ER immediately

## 2025-05-23 NOTE — ED PROVIDER NOTES
Chief Complaint   Patient presents with    Fatigue       History obtained from: patient   services not used     HPI:     Qing Miranda is a 70 year old female who presents with generalized fatigue x 1 week. Patient denies any other symptoms. Patient states she would like to be tested for a UTI as she has hx of UTIs with similar fatigue. Patient continues to eat and drink normally and states she is otherwise feeling well. Patient continues to go to work. Patient has appt with PCP in 3 weeks. Denies urinary symptoms, abdominal pain, nausea, vomiting, flank pain, fevers, chills, chest pain, shortness of breath, leg pain or swelling, headache, cough, congestion, sore throat.     Patient underwent coronary angiogram and catheterization on 2025 following abnormal stress test which was normal, no significant coronary disease.    PMH  Past Medical History[1]    PFSH    PFS asessment screens reviewed and agree.  Nurses notes reviewed I agree with documentation.    Family History[2]  Family history reviewed with patient/caregiver and is not pertinent to presenting problem.  Social History     Socioeconomic History    Marital status:      Spouse name: Not on file    Number of children: Not on file    Years of education: Not on file    Highest education level: Not on file   Occupational History    Not on file   Tobacco Use    Smoking status: Former     Current packs/day: 0.00     Average packs/day: 1 pack/day for 30.0 years (30.0 ttl pk-yrs)     Types: Cigarettes     Start date: 1966     Quit date: 1996     Years since quittin.4     Passive exposure: Past    Smokeless tobacco: Never    Tobacco comments:     Quit at 40 y/o   Vaping Use    Vaping status: Never Used   Substance and Sexual Activity    Alcohol use: Yes     Alcohol/week: 0.0 standard drinks of alcohol     Comment: socially    Drug use: No    Sexual activity: Not on file   Other Topics Concern     Service Not  Asked    Blood Transfusions Not Asked    Caffeine Concern Yes     Comment: Coffee, 2 cups per week     Occupational Exposure Not Asked    Hobby Hazards Not Asked    Sleep Concern Not Asked    Stress Concern Not Asked    Weight Concern Not Asked    Special Diet Not Asked    Back Care Not Asked    Exercise No    Bike Helmet Not Asked    Seat Belt Not Asked    Self-Exams Not Asked   Social History Narrative    Not on file     Social Drivers of Health     Food Insecurity: No Food Insecurity (11/9/2024)    Food Insecurity     Food Insecurity: Never true   Transportation Needs: No Transportation Needs (11/12/2024)    Transportation Needs     Lack of Transportation: No     Car Seat: Not on file   Housing Stability: Low Risk  (11/9/2024)    Housing Stability     Housing Instability: No     Housing Instability Emergency: Not on file     Crib or Bassinette: Not on file         ROS:   Positive for stated complaint: fatigue   Other systems are as noted in HPI.   All other systems reviewed and negative except as noted above.    Physical Exam:   Vital signs and nursing note reviewed.       /65   Pulse 64   Temp 98.1 °F (36.7 °C) (Oral)   Resp 18   SpO2 96%     GENERAL: well developed, no acute distress, non-toxic appearing   SKIN: good skin turgor, no obvious rashes  HEAD: normocephalic, atraumatic  EYES: sclera non-icteric bilaterally, conjunctiva clear bilaterally  NOSE: nasal turbinates pink, normal mucosa  OROPHARYNX: MMM, pharynx clear, no exudates or swelling, uvula midline, no tongue elevation, maintaining airway and secretions  NECK: supple, no lymphadenopathy, no nuchal rigidity, no trismus, no edema, phonation normal    CARDIO: RRR, normal heart sounds   LUNGS: clear to auscultation bilaterally, no increased WOB, no rales, rhonchi, or wheezes  GI: normoactive bowel sounds, abdomen soft and non-tender, no CVA tenderness bilaterally   EXTREMITIES: no cyanosis or edema, MONTANEZ without difficulty  NEURO: no focal  deficits  PSYCH: alert and oriented x3, answering questions appropriately, mood appropriate    MDM/Assessment/Plan:   Orders for this encounter:    Orders Placed This Encounter    POCT CBC     Release to patient:   Immediate    POCT Urinalysis Dipstick    POCT ISTAT chem8 cartridge    Rapid SARS-CoV-2 by PCR     Release to patient:   Immediate    POCT Flu Test     Release to patient:   Immediate    Urine Culture, Routine     Specimen source::   Urine, clean catch     Documentation of symptoms of UTI or sepsis::   Documentation of symptoms of UTI or sepsis must be corroborated within the EMR     Release to patient:   Immediate    POCT Urine Dip    iStat (Chem 8)       Labs performed this visit:  Recent Results (from the past 10 hours)   Rapid SARS-CoV-2 by PCR    Collection Time: 05/23/25  2:30 PM    Specimen: Nares; Other   Result Value Ref Range    Rapid SARS-CoV-2 by PCR Not Detected Not Detected   POCT Flu Test    Collection Time: 05/23/25  2:30 PM    Specimen: Nares; Other   Result Value Ref Range    POCT INFLUENZA A Negative Negative    POCT INFLUENZA B Negative Negative   POCT Urinalysis Dipstick    Collection Time: 05/23/25  2:34 PM   Result Value Ref Range    Urine Color Yellow Yellow    Urine Clarity Clear Clear    Specific Gravity, Urine 1.020 1.005 - 1.030    PH, Urine 5.0 5.0 - 8.0    Protein urine Negative Negative mg/dL    Glucose, Urine Negative Negative mg/dL    Ketone, Urine Negative Negative mg/dL    Bilirubin, Urine Negative Negative    Blood, Urine Negative Negative    Nitrite Urine Negative Negative    Urobilinogen urine <2.0 <2.0 mg/dL    Leukocyte esterase urine Trace (A) Negative   POCT CBC    Collection Time: 05/23/25  3:09 PM   Result Value Ref Range    WBC IC 8.1 4.0 - 11.0 x10ˆ3/uL    RBC IC 4.66 3.80 - 5.30 X10ˆ6/uL    HGB IC 12.9 12.0 - 16.0 g/dL    HCT IC 40.2 35.0 - 48.0 %    MCV IC 86.3 80.0 - 100.0 fL    MCH IC 27.7 26.0 - 34.0 pg    MCHC IC 32.1 31.0 - 37.0 g/dL    PLT .0  150.0 - 450.0 X10ˆ3/uL    # Neutrophil 5.2 1.5 - 7.7 X10ˆ3/uL    # Lymphocyte 2.5 1.0 - 4.0 X10ˆ3/uL    # Mixed Cells 0.4 0.1 - 1.0 X10ˆ3/uL    Neutrophil % 63.7 %    Lymphocyte % 30.9 %    Mixed Cell % 5.4 %   POCT ISTAT chem8 cartridge    Collection Time: 05/23/25  3:13 PM   Result Value Ref Range    ISTAT Sodium 140 136 - 145 mmol/L    ISTAT BUN 20 (H) 7 - 18 mg/dL    ISTAT Potassium 3.8 3.6 - 5.1 mmol/L    ISTAT Chloride 100 98 - 112 mmol/L    ISTAT Ionized Calcium 1.18 1.12 - 1.32 mmol/L    ISTAT Hematocrit 43 34 - 50 %    ISTAT Glucose 100 (H) 70 - 99 mg/dL    ISTAT TCO2 27 21 - 32 mmol/L    ISTAT Creatinine 0.80 0.55 - 1.02 mg/dL    eGFR-Cr 80 >=60 mL/min/1.73m2       Imaging performed this visit:  No orders to display       Medical Decision Making  DDx includes viral illness versus COVID versus flu versus UTI versus electrolyte derangement versus dehydration versus other.  Patient is overall very well-appearing with stable vitals presenting with generalized fatigue x 1 week.  No associated symptoms.    COVID and flu test negative.  Urine dipstick analysis reviewed, notable for trace leukocyte esterase otherwise grossly unremarkable.  Urine culture pending.  CBC reviewed, grossly unremarkable without evidence of infection or anemia.  BMP reviewed, grossly unremarkable without evidence of electrolyte derangements or kidney dysfunction.    On reassessment, patient is resting comfortably and remains well-appearing.  Vitals remained stable.  No new or worsening symptoms throughout IC stay.  Discussed all results with patient who is reassured by these findings.  Given workup as per above, will discharge patient home with strict ER precautions and prompt follow-up.  Patient states she feels comfortable with this plan.  Discussed supportive care including rest and increased fluid intake.  Instructed patient to go directly to nearest ER with any worsening or concerning symptoms.  Epic message sent to patient's PCP  Dr. Shore and RN to ensure prompt follow up.     Amount and/or Complexity of Data Reviewed  Labs: ordered.    Risk  OTC drugs.          Diagnosis:    ICD-10-CM    1. Fatigue, unspecified type  R53.83 Rapid SARS-CoV-2 by PCR     POCT Flu Test     Rapid SARS-CoV-2 by PCR     POCT Flu Test     Urine Culture, Routine     Urine Culture, Routine          All results reviewed and discussed with patient/patient's family. Patient/patient's family verbalize excellent understanding of instructions and feels comfortable with plan. All of patient's/patient's family's questions were addressed.   See AVS for detailed discharge instructions for your condition today.    Follow Up with:  Christos Shore MD  [address] [place] [address]  [phone]            Note: This document was dictated using Dragon medical dictation software.  Proofreading was performed to the best of my ability, but errors may be present.    Olive Torres PA-C         [1]   Past Medical History:   Diverticulosis    Frequent UTI    Hemorrhoids    High cholesterol    HTN (hypertension)    Hyperlipidemia    Hypothyroidism    subclinical hypothyroidism    Macular degeneration    Morbid obesity with BMI of 50.0-59.9, adult (HCC)    Prediabetes    Pregnancy (HCC)    Per [employer]:  \" 2, Para 1, Miscarriage, 1 .\"    Supraventricular tachycardia (HCC)    Management:  AV node ablation     Vitamin B12 deficiency   [2]   Family History  Problem Relation Age of Onset    Other (Other) Father         copd    Heart Disorder Father     Melanoma Father     Cancer Father         Basal Cell Cancer    Diabetes Mother     Heart Disorder Mother         Congestive Heart Failure    Renal Disease Mother     Other (Other) Mother     Ovarian Cancer Maternal Grandmother     Stroke Sister     Diabetes Sister     Obesity Sister     Other (Other) Brother         cirrhosis liver    Other (Other) Other         No Family h    Other (Other) Other          No Family h

## 2025-06-03 ENCOUNTER — MED REC SCAN ONLY (OUTPATIENT)
Dept: FAMILY MEDICINE CLINIC | Facility: CLINIC | Age: 70
End: 2025-06-03

## 2025-06-11 ENCOUNTER — OFFICE VISIT (OUTPATIENT)
Dept: INTERNAL MEDICINE CLINIC | Facility: CLINIC | Age: 70
End: 2025-06-11
Payer: MEDICARE

## 2025-06-11 VITALS
SYSTOLIC BLOOD PRESSURE: 130 MMHG | BODY MASS INDEX: 53 KG/M2 | HEART RATE: 74 BPM | DIASTOLIC BLOOD PRESSURE: 80 MMHG | WEIGHT: 280.56 LBS

## 2025-06-11 DIAGNOSIS — I10 PRIMARY HYPERTENSION: ICD-10-CM

## 2025-06-11 DIAGNOSIS — E04.1 THYROID NODULE: ICD-10-CM

## 2025-06-11 DIAGNOSIS — R35.0 FREQUENT URINATION: Primary | ICD-10-CM

## 2025-06-11 DIAGNOSIS — E78.2 MIXED HYPERLIPIDEMIA: ICD-10-CM

## 2025-06-11 DIAGNOSIS — R21 RASH AND NONSPECIFIC SKIN ERUPTION: ICD-10-CM

## 2025-06-11 DIAGNOSIS — R73.03 PREDIABETES: ICD-10-CM

## 2025-06-11 DIAGNOSIS — R60.0 BILATERAL LEG EDEMA: ICD-10-CM

## 2025-06-11 LAB
APPEARANCE: CLEAR
BILIRUBIN: NEGATIVE
GLUCOSE (URINE DIPSTICK): NEGATIVE MG/DL
KETONES (URINE DIPSTICK): NEGATIVE MG/DL
MULTISTIX LOT#: ABNORMAL NUMERIC
NITRITE, URINE: NEGATIVE
OCCULT BLOOD: NEGATIVE
PH, URINE: 5.5 (ref 4.5–8)
PROTEIN (URINE DIPSTICK): NEGATIVE MG/DL
SPECIFIC GRAVITY: 1.02 (ref 1–1.03)
URINE-COLOR: YELLOW
UROBILINOGEN,SEMI-QN: 0.2 MG/DL (ref 0–1.9)

## 2025-06-11 PROCEDURE — 99214 OFFICE O/P EST MOD 30 MIN: CPT | Performed by: INTERNAL MEDICINE

## 2025-06-11 PROCEDURE — 81003 URINALYSIS AUTO W/O SCOPE: CPT | Performed by: INTERNAL MEDICINE

## 2025-06-11 RX ORDER — HYDROCORTISONE 25 MG/G
1 CREAM TOPICAL 2 TIMES DAILY
Qty: 30 G | Refills: 0 | Status: SHIPPED | OUTPATIENT
Start: 2025-06-11

## 2025-06-11 NOTE — PROGRESS NOTES
Subjective:     Patient ID: Qing Miranda is a 70 year old female.    Swelling  This is a chronic (bilateral leg edema) problem. The current episode started more than 1 month ago. Pertinent negatives include no chest pain, fever, numbness or weakness. Nothing aggravates the symptoms. The treatment provided mild (pt on torsemide) relief.   Back Pain  This is a new problem. The current episode started in the past 7 days. The problem occurs intermittently. The problem has been waxing and waning since onset. The pain is present in the lumbar spine. The pain does not radiate. The pain is mild. Pertinent negatives include no bladder incontinence, bowel incontinence, chest pain, dysuria, fever, numbness or weakness. Risk factors include obesity. She has tried nothing for the symptoms. The treatment provided no relief.   Rash  This is a new problem. The current episode started 1 to 4 weeks ago. The affected locations include the neck. The rash is characterized by dryness, scaling and itchiness. She was exposed to nothing. Pertinent negatives include no fever. Past treatments include moisturizer. The treatment provided no relief.       History/Other:   Review of Systems   Constitutional: Negative.  Negative for fever.   Respiratory: Negative.     Cardiovascular:  Positive for leg swelling. Negative for chest pain and palpitations.   Gastrointestinal: Negative.  Negative for bowel incontinence.   Genitourinary:  Positive for frequency. Negative for bladder incontinence, dysuria and hematuria.   Musculoskeletal:  Positive for back pain.   Neurological:  Negative for weakness and numbness.     Current Medications[1]  Allergies:Allergies[2]    Past Medical History[3]   Past Surgical History[4]   Family History[5]   Social History: Short Social Hx on File[6]     Objective:   Physical Exam  Constitutional:       General: She is not in acute distress.     Appearance: She is obese. She is not ill-appearing,  toxic-appearing or diaphoretic.   HENT:      Right Ear: External ear normal.      Left Ear: External ear normal.   Eyes:      General: No scleral icterus.        Right eye: No discharge.         Left eye: No discharge.   Cardiovascular:      Rate and Rhythm: Normal rate and regular rhythm.      Heart sounds: Normal heart sounds. No murmur heard.     No gallop.   Pulmonary:      Effort: Pulmonary effort is normal. No respiratory distress.      Breath sounds: Normal breath sounds. No wheezing or rales.   Abdominal:      General: Bowel sounds are normal. There is no distension.      Palpations: Abdomen is soft. There is no mass.      Tenderness: There is no abdominal tenderness. There is no guarding.   Musculoskeletal:      Cervical back: Normal range of motion and neck supple. No rigidity or tenderness.      Right lower leg: No edema.      Left lower leg: No edema.   Lymphadenopathy:      Cervical: No cervical adenopathy.   Skin:     Findings: Rash present. Rash is scaling.          Neurological:      Mental Status: She is alert.         Assessment & Plan:   (R35.0) Frequent urination  (primary encounter diagnosis)  Plan: POC Urinalysis, Automated Dip without         microscopy (PCA and EMMG ONLY) [39661], Urine         Culture, Routine [E], Urine Culture, Routine         [E]        We did urine dip and positive for pyuria; will check urine culture .    (R21) Rash and nonspecific skin eruption  Plan: has a rash on her neck, possibly from using dial soap which is new to her so told to stop; gave her hydrocortisone cream to apply for one week. Call back if persist/worsens.     (R60.0) Bilateral leg edema  Plan: no edema noted today; pt states taking torsemide 20mg daily dries her up too much so I told her to alternate 10mg to 20mg every other day ; cardiac workup was negative recently and was seen by cardiologist already.     (R73.03) Prediabetes  Plan: Hemoglobin A1C        Check fbg and A1c.     (I10) Primary  hypertension  Plan: bp controlled. Cpm .    (E78.2) Mixed hyperlipidemia  Plan: Lipid Panel, Comp Metabolic Panel (14) [E]        Check lipid panel; ff low fat low chol diet and current chol med.     (E04.1) Thyroid nodule  Plan: US THYROID (CPT=76536)        Thyroid nodule seen incidentally on pt's ct chest ordered by her pulmo;  I told her will need to do thyroid Us.        Orders Placed This Encounter   Procedures    POC Urinalysis, Automated Dip without microscopy (PCA and EMMG ONLY) [67888]       Meds This Visit:  Requested Prescriptions      No prescriptions requested or ordered in this encounter       Imaging & Referrals:  US THYROID (CPT=76536)            [1]   Current Outpatient Medications   Medication Sig Dispense Refill    torsemide 20 MG Oral Tab Take 1 tablet (20 mg total) by mouth daily. (Patient taking differently: Take 1 tablet (20 mg total) by mouth Every afternoon at 2:00 pm.) 90 tablet 1    Potassium Chloride ER 10 MEQ Oral Tab CR Take 1 tablet (10 mEq total) by mouth daily as needed. (Patient taking differently: Take 1 tablet (10 mEq total) by mouth Every afternoon at 2:00 pm.) 90 tablet 3    Irbesartan-hydroCHLOROthiazide (AVALIDE) 150-12.5 MG Oral Tab Take 1 tablet by mouth daily. 90 tablet 1    fluticasone furoate-vilanterol (BREO ELLIPTA) 100-25 MCG/ACT Inhalation Aerosol Powder, Breath Activated Inhale 1 puff into the lungs daily. Rinse your mouth with water without swallowing after using BREO to help reduce your chance of getting thrush. (Patient taking differently: Inhale 1 puff into the lungs daily as needed. Rinse your mouth with water without swallowing after using BREO to help reduce your chance of getting thrush.) 1 each 3    cyanocobalamin 1000 MCG/ML Injection Solution INJECT 1 ML INTO THE MUSCLE EVERY 15 DAYS 24 mL 1    Syringe/Needle, Disp, (BD LUER-MARGARITO SYRINGE) 25G X 1\" 3 ML Does not apply Misc USE TWICE A MONTH AS DIRECTED 24 each 2    atorvastatin 20 MG Oral Tab Take 1 tablet  (20 mg total) by mouth nightly. 90 tablet 0    Homeopathic Products (IRRITATED EYE RELIEF OP) Apply to eye as needed.     [2]   Allergies  Allergen Reactions    Ciprofloxacin HIVES   [3]   Past Medical History:   Diverticulosis    Frequent UTI    Hemorrhoids    High cholesterol    HTN (hypertension)    Hyperlipidemia    Hypothyroidism    subclinical hypothyroidism    Macular degeneration    Morbid obesity with BMI of 50.0-59.9, adult (HCC)    Prediabetes    Pregnancy (HCC)    Per NextGen:  \" 2, Para 1, Miscarriage, 1 .\"    Supraventricular tachycardia (HCC)    Management:  AV node ablation     Vitamin B12 deficiency   [4]   Past Surgical History:  Procedure Laterality Date    Ablation      AV node ablation          Cholecystectomy      Colonoscopy N/A 2017    Procedure: COLONOSCOPY;  Surgeon: Vicente Bryant MD;  Location: [facility] ENDOSCOPY    Ep dual chamber pacemaker      Hemorrhoidectomy      Other surgical history      laparotomy- adhesiolysis of bowel    Sinus surgery   Left     cyst left side   [5]   Family History  Problem Relation Age of Onset    Other (Other) Father         copd    Heart Disorder Father     Melanoma Father     Cancer Father         Basal Cell Cancer    Diabetes Mother     Heart Disorder Mother         Congestive Heart Failure    Renal Disease Mother     Other (Other) Mother     Ovarian Cancer Maternal Grandmother     Stroke Sister     Diabetes Sister     Obesity Sister     Other (Other) Brother         cirrhosis liver    Other (Other) Other         No Family h    Other (Other) Other         No Family h   [6]   Social History  Socioeconomic History    Marital status:    Tobacco Use    Smoking status: Former     Current packs/day: 0.00     Average packs/day: 1 pack/day for 30.0 years (30.0 ttl pk-yrs)     Types: Cigarettes     Start date: 1966     Quit date: 1996     Years since quittin.4     Passive exposure: Past    Smokeless  tobacco: Never    Tobacco comments:     Quit at 40 y/o   Vaping Use    Vaping status: Never Used   Substance and Sexual Activity    Alcohol use: Yes     Alcohol/week: 0.0 standard drinks of alcohol     Comment: socially    Drug use: No   Other Topics Concern    Caffeine Concern Yes     Comment: Coffee, 2 cups per week     Exercise No     Social Drivers of Health     Food Insecurity: No Food Insecurity (11/9/2024)    Food Insecurity     Food Insecurity: Never true   Transportation Needs: No Transportation Needs (11/12/2024)    Transportation Needs     Lack of Transportation: No   Housing Stability: Low Risk  (11/9/2024)    Housing Stability     Housing Instability: No

## 2025-06-13 ENCOUNTER — TELEPHONE (OUTPATIENT)
Dept: INTERNAL MEDICINE CLINIC | Facility: CLINIC | Age: 70
End: 2025-06-13

## 2025-06-13 NOTE — TELEPHONE ENCOUNTER
Reached out to patient. Name and  has been verified. Informed patient she needs to repeat UA, order is in system. Patient verbalizes understanding.

## 2025-06-14 ENCOUNTER — LAB ENCOUNTER (OUTPATIENT)
Dept: LAB | Age: 70
End: 2025-06-14
Attending: INTERNAL MEDICINE
Payer: MEDICARE

## 2025-06-14 DIAGNOSIS — E78.2 MIXED HYPERLIPIDEMIA: ICD-10-CM

## 2025-06-14 DIAGNOSIS — R73.03 PREDIABETES: ICD-10-CM

## 2025-06-14 DIAGNOSIS — R35.0 URINE FREQUENCY: ICD-10-CM

## 2025-06-14 LAB
ALBUMIN SERPL-MCNC: 4.5 G/DL (ref 3.2–4.8)
ALBUMIN/GLOB SERPL: 2 {RATIO} (ref 1–2)
ALP LIVER SERPL-CCNC: 107 U/L (ref 55–142)
ALT SERPL-CCNC: 22 U/L (ref 10–49)
ANION GAP SERPL CALC-SCNC: 7 MMOL/L (ref 0–18)
AST SERPL-CCNC: 21 U/L (ref ?–34)
BILIRUB SERPL-MCNC: 0.4 MG/DL (ref 0.2–1.1)
BILIRUB UR QL: NEGATIVE
BUN BLD-MCNC: 17 MG/DL (ref 9–23)
BUN/CREAT SERPL: 19.5 (ref 10–20)
CALCIUM BLD-MCNC: 9.5 MG/DL (ref 8.7–10.4)
CHLORIDE SERPL-SCNC: 104 MMOL/L (ref 98–112)
CHOLEST SERPL-MCNC: 224 MG/DL (ref ?–200)
CO2 SERPL-SCNC: 30 MMOL/L (ref 21–32)
CREAT BLD-MCNC: 0.87 MG/DL (ref 0.55–1.02)
EGFRCR SERPLBLD CKD-EPI 2021: 72 ML/MIN/1.73M2 (ref 60–?)
EST. AVERAGE GLUCOSE BLD GHB EST-MCNC: 140 MG/DL (ref 68–126)
FASTING PATIENT LIPID ANSWER: YES
FASTING STATUS PATIENT QL REPORTED: YES
GLOBULIN PLAS-MCNC: 2.3 G/DL (ref 2–3.5)
GLUCOSE BLD-MCNC: 144 MG/DL (ref 70–99)
GLUCOSE UR-MCNC: NORMAL MG/DL
HBA1C MFR BLD: 6.5 % (ref ?–5.7)
HDLC SERPL-MCNC: 41 MG/DL (ref 40–59)
HGB UR QL STRIP.AUTO: NEGATIVE
KETONES UR-MCNC: NEGATIVE MG/DL
LDLC SERPL CALC-MCNC: 138 MG/DL (ref ?–100)
LEUKOCYTE ESTERASE UR QL STRIP.AUTO: 500
NITRITE UR QL STRIP.AUTO: NEGATIVE
NONHDLC SERPL-MCNC: 183 MG/DL (ref ?–130)
OSMOLALITY SERPL CALC.SUM OF ELEC: 296 MOSM/KG (ref 275–295)
PH UR: 5 [PH] (ref 5–8)
POTASSIUM SERPL-SCNC: 4.4 MMOL/L (ref 3.5–5.1)
PROT SERPL-MCNC: 6.8 G/DL (ref 5.7–8.2)
PROT UR-MCNC: NEGATIVE MG/DL
SODIUM SERPL-SCNC: 141 MMOL/L (ref 136–145)
SP GR UR STRIP: 1.02 (ref 1–1.03)
TRIGL SERPL-MCNC: 250 MG/DL (ref 30–149)
UROBILINOGEN UR STRIP-ACNC: NORMAL
VLDLC SERPL CALC-MCNC: 47 MG/DL (ref 0–30)

## 2025-06-14 PROCEDURE — 80061 LIPID PANEL: CPT

## 2025-06-14 PROCEDURE — 36415 COLL VENOUS BLD VENIPUNCTURE: CPT

## 2025-06-14 PROCEDURE — 80053 COMPREHEN METABOLIC PANEL: CPT

## 2025-06-14 PROCEDURE — 87086 URINE CULTURE/COLONY COUNT: CPT

## 2025-06-14 PROCEDURE — 81001 URINALYSIS AUTO W/SCOPE: CPT

## 2025-06-14 PROCEDURE — 83036 HEMOGLOBIN GLYCOSYLATED A1C: CPT

## 2025-06-20 ENCOUNTER — HOSPITAL ENCOUNTER (OUTPATIENT)
Dept: ULTRASOUND IMAGING | Facility: HOSPITAL | Age: 70
Discharge: HOME | End: 2025-06-20
Attending: INTERNAL MEDICINE
Payer: MEDICARE

## 2025-06-20 DIAGNOSIS — E04.1 THYROID NODULE: ICD-10-CM

## 2025-06-20 PROCEDURE — 76536 US EXAM OF HEAD AND NECK: CPT | Performed by: INTERNAL MEDICINE

## 2025-07-17 ENCOUNTER — NURSE TRIAGE (OUTPATIENT)
Dept: INTERNAL MEDICINE CLINIC | Facility: CLINIC | Age: 70
End: 2025-07-17

## 2025-07-17 NOTE — TELEPHONE ENCOUNTER
Fully booked today; I can add her in tomorrow at 11:45am; she may restart her torsemide today.  ER if worsens .   Dr Garcia

## 2025-07-17 NOTE — TELEPHONE ENCOUNTER
Dr. Shore - please advise, can you see patient today? [Takes her 1 hour to commute to office; declined any visits to Immediate Care or Emergency Department - she states she will not go to either]  Please reply to pool: EM RN TRIAGE  Action Requested: Summary for Provider     []  Critical Lab, Recommendations Needed  [x] Need Additional Advice  []   FYI    []   Need Orders  [] Need Medications Sent to Pharmacy  []  Other     SUMMARY: Edema of legs bilaterally, left worse than right. Pain present-->8/10, pain also present at back of knee and of left foot. Can ambulate, but painful. Was told by cardiology to stop diuretic and wear compression stockings [one is too tight and one falls down]. Same day immediate evaluation was advised--> refused Immediate Care or Emergency Department and states she would like to see Dr. Shore today, it takes 1 hour to commute to office from work or tomorrow a any time. [See below for more details]     Reason for call: Leg Swelling  Onset: March    Reason for Disposition   Thigh, calf, or ankle swelling in both legs, but one side is definitely more swollen (Exception: Longstanding difference between legs.)    Protocols used: Leg Swelling and Edema-A-OH      Patient called states she has swelling of legs, left is worse than right. Now the swelling extends to knee. She had gone to cardiologist recently regarding diuretic change--> she was told to not take any diuretics and advised she should start using compression stockings, one is too tight and other falls down. She feels like she has become dehydrated. She states she is now in pain-->mostly in left leg, pain also present at back of knee and foot--> 8/10, can barely walk, she can walk, just hurts to walk. She denies any shortness of breath, chest pain, and/or chest tightness, discoloration or change in temperature, weeping fluid from legs. Refer to system/assessment yes/no answers. Her BP last time she checked it was 129/69 last  night on right arm, while sitting with both feet raised, uncrossed. Same day immediate evaluation was advised--> refused Immediate Care or Emergency Department and states she would like to see Dr. Shore today, it takes 1 hour to commute to office from work or tomorrow a any time. She was made aware same day evaluation was advised and I will convey the above to Dr. Shore. Patient instructed any new or worsening symptoms [reviewed] seek immediate medical attention-->Emergency Department. Patient verbalized understanding. No further questions or concerns at this time.

## 2025-07-18 ENCOUNTER — OFFICE VISIT (OUTPATIENT)
Dept: INTERNAL MEDICINE CLINIC | Facility: CLINIC | Age: 70
End: 2025-07-18
Payer: MEDICARE

## 2025-07-18 VITALS
HEART RATE: 73 BPM | DIASTOLIC BLOOD PRESSURE: 80 MMHG | WEIGHT: 277.38 LBS | SYSTOLIC BLOOD PRESSURE: 130 MMHG | BODY MASS INDEX: 52 KG/M2

## 2025-07-18 DIAGNOSIS — R60.0 BILATERAL LEG EDEMA: Primary | ICD-10-CM

## 2025-07-18 PROCEDURE — 99213 OFFICE O/P EST LOW 20 MIN: CPT | Performed by: INTERNAL MEDICINE

## 2025-07-18 RX ORDER — FUROSEMIDE 40 MG/1
40 TABLET ORAL DAILY
Qty: 30 TABLET | Refills: 2 | Status: SHIPPED | OUTPATIENT
Start: 2025-07-18

## 2025-07-20 NOTE — PROGRESS NOTES
Subjective:     Patient ID: Qing Miranda is a 70 year old female.    Swelling  This is a chronic problem. The current episode started more than 1 month ago. The problem occurs constantly. The problem has been waxing and waning. Pertinent negatives include no chest pain. Associated symptoms comments: No SOB. Nothing aggravates the symptoms. Treatments tried: pt had been on torsemide before but states that this has been drying her too much.       History/Other:   Review of Systems   Constitutional: Negative.    Respiratory: Negative.     Cardiovascular:  Positive for leg swelling. Negative for chest pain and palpitations.   Gastrointestinal: Negative.    Genitourinary: Negative.      Current Medications[1]  Allergies:Allergies[2]    Past Medical History[3]   Past Surgical History[4]   Family History[5]   Social History: Short Social Hx on File[6]     Objective:   Physical Exam  Constitutional:       General: She is not in acute distress.     Appearance: She is obese. She is not ill-appearing, toxic-appearing or diaphoretic.   Neck:      Vascular: No carotid bruit.   Cardiovascular:      Rate and Rhythm: Normal rate.      Pulses: Normal pulses.      Heart sounds: Normal heart sounds. No murmur heard.     No gallop.   Pulmonary:      Effort: No respiratory distress.      Breath sounds: Normal breath sounds. No wheezing or rales.   Abdominal:      General: Bowel sounds are normal. There is no distension.      Palpations: Abdomen is soft.      Tenderness: There is no abdominal tenderness. There is no guarding.   Musculoskeletal:         General: Swelling present.      Cervical back: Normal range of motion and neck supple. No rigidity or tenderness.      Right lower leg: Edema present.      Left lower leg: Edema present.      Comments: +1 pitting edema noted on the feet as well as ankles.   Lymphadenopathy:      Cervical: No cervical adenopathy.   Skin:     Coloration: Skin is not jaundiced or pale.    Neurological:      Mental Status: She is alert.         Assessment & Plan:   1. Bilateral leg edema  Patient just had a venous duplex study done by cardiology and did show incompetent valves in the veins.  There is no DVT.  I put her back on furosemide 40 mg p.o. daily as needed plus her potassium pills.  Check BMP in about a week.  Leg elevation and continue to use compression stockings.  - Basic Metabolic Panel (8); Future     Orders Placed This Encounter   Procedures    Basic Metabolic Panel (8)       Meds This Visit:  Requested Prescriptions     Signed Prescriptions Disp Refills    furosemide (LASIX) 40 MG Oral Tab 30 tablet 2     Sig: Take 1 tablet (40 mg total) by mouth daily.       Imaging & Referrals:  None            [1]   Current Outpatient Medications   Medication Sig Dispense Refill    furosemide (LASIX) 40 MG Oral Tab Take 1 tablet (40 mg total) by mouth daily. 30 tablet 2    Potassium Chloride ER 10 MEQ Oral Tab CR Take 1 tablet (10 mEq total) by mouth daily as needed. (Patient taking differently: Take 1 tablet (10 mEq total) by mouth Every afternoon at 2:00 pm.) 90 tablet 3    Irbesartan-hydroCHLOROthiazide (AVALIDE) 150-12.5 MG Oral Tab Take 1 tablet by mouth daily. 90 tablet 1    fluticasone furoate-vilanterol (BREO ELLIPTA) 100-25 MCG/ACT Inhalation Aerosol Powder, Breath Activated Inhale 1 puff into the lungs daily. Rinse your mouth with water without swallowing after using BREO to help reduce your chance of getting thrush. (Patient taking differently: Inhale 1 puff into the lungs daily as needed. Rinse your mouth with water without swallowing after using BREO to help reduce your chance of getting thrush.) 1 each 3    cyanocobalamin 1000 MCG/ML Injection Solution INJECT 1 ML INTO THE MUSCLE EVERY 15 DAYS 24 mL 1    Syringe/Needle, Disp, (BD LUER-MARGARITO SYRINGE) 25G X 1\" 3 ML Does not apply Misc USE TWICE A MONTH AS DIRECTED 24 each 2    atorvastatin 20 MG Oral Tab Take 1 tablet (20 mg total) by  mouth nightly. 90 tablet 0    Homeopathic Products (IRRITATED EYE RELIEF OP) Apply to eye as needed.      hydrocortisone 2.5 % External Cream Apply 1 Application topically 2 (two) times daily. 30 g 0   [2]   Allergies  Allergen Reactions    Ciprofloxacin HIVES   [3]   Past Medical History:   Diverticulosis    Frequent UTI    Hemorrhoids    High cholesterol    HTN (hypertension)    Hyperlipidemia    Hypothyroidism    subclinical hypothyroidism    Macular degeneration    Morbid obesity with BMI of 50.0-59.9, adult (HCC)    Prediabetes    Pregnancy (HCC)    Per NextGen:  \" 2, Para 1, Miscarriage, 1 .\"    Supraventricular tachycardia (HCC)    Management:  AV node ablation     Vitamin B12 deficiency   [4]   Past Surgical History:  Procedure Laterality Date    Ablation      AV node ablation          Cholecystectomy      Colonoscopy N/A 2017    Procedure: COLONOSCOPY;  Surgeon: Vicente Bryant MD;  Location: [facility] ENDOSCOPY    Ep dual chamber pacemaker      Hemorrhoidectomy      Other surgical history      laparotomy- adhesiolysis of bowel    Sinus surgery   Left     cyst left side   [5]   Family History  Problem Relation Age of Onset    Other (Other) Father         copd    Heart Disorder Father     Melanoma Father     Cancer Father         Basal Cell Cancer    Diabetes Mother     Heart Disorder Mother         Congestive Heart Failure    Renal Disease Mother     Other (Other) Mother     Ovarian Cancer Maternal Grandmother     Stroke Sister     Diabetes Sister     Obesity Sister     Other (Other) Brother         cirrhosis liver    Other (Other) Other         No Family h    Other (Other) Other         No Family h   [6]   Social History  Socioeconomic History    Marital status:    Tobacco Use    Smoking status: Former     Current packs/day: 0.00     Average packs/day: 1 pack/day for 30.0 years (30.0 ttl pk-yrs)     Types: Cigarettes     Start date: 1966     Quit date:  1996     Years since quittin.5     Passive exposure: Past    Smokeless tobacco: Never    Tobacco comments:     Quit at 40 y/o   Vaping Use    Vaping status: Never Used   Substance and Sexual Activity    Alcohol use: Yes     Alcohol/week: 0.0 standard drinks of alcohol     Comment: socially    Drug use: No   Other Topics Concern    Caffeine Concern Yes     Comment: Coffee, 2 cups per week     Exercise No     Social Drivers of Health     Food Insecurity: No Food Insecurity (2024)    Food Insecurity     Food Insecurity: Never true   Transportation Needs: No Transportation Needs (2024)    Transportation Needs     Lack of Transportation: No   Housing Stability: Low Risk  (2024)    Housing Stability     Housing Instability: No

## 2025-07-23 ENCOUNTER — LAB ENCOUNTER (OUTPATIENT)
Dept: LAB | Age: 70
End: 2025-07-23
Attending: INTERNAL MEDICINE
Payer: MEDICARE

## 2025-07-23 DIAGNOSIS — R60.0 BILATERAL LEG EDEMA: ICD-10-CM

## 2025-07-23 LAB
ANION GAP SERPL CALC-SCNC: 6 MMOL/L (ref 0–18)
BUN BLD-MCNC: 12 MG/DL (ref 9–23)
BUN/CREAT SERPL: 14.6 (ref 10–20)
CALCIUM BLD-MCNC: 9.4 MG/DL (ref 8.7–10.4)
CHLORIDE SERPL-SCNC: 104 MMOL/L (ref 98–112)
CO2 SERPL-SCNC: 31 MMOL/L (ref 21–32)
CREAT BLD-MCNC: 0.82 MG/DL (ref 0.55–1.02)
EGFRCR SERPLBLD CKD-EPI 2021: 77 ML/MIN/1.73M2 (ref 60–?)
FASTING STATUS PATIENT QL REPORTED: NO
GLUCOSE BLD-MCNC: 87 MG/DL (ref 70–99)
OSMOLALITY SERPL CALC.SUM OF ELEC: 291 MOSM/KG (ref 275–295)
POTASSIUM SERPL-SCNC: 4.8 MMOL/L (ref 3.5–5.1)
SODIUM SERPL-SCNC: 141 MMOL/L (ref 136–145)

## 2025-07-23 PROCEDURE — 80048 BASIC METABOLIC PNL TOTAL CA: CPT

## 2025-07-23 PROCEDURE — 36415 COLL VENOUS BLD VENIPUNCTURE: CPT

## 2025-07-24 ENCOUNTER — MED REC SCAN ONLY (OUTPATIENT)
Dept: INTERNAL MEDICINE CLINIC | Facility: CLINIC | Age: 70
End: 2025-07-24

## (undated) DEVICE — SNARE OPTMZ PLPCTM TRP

## (undated) DEVICE — REM POLYHESIVE ADULT PATIENT RETURN ELECTRODE: Brand: VALLEYLAB

## (undated) DEVICE — Device: Brand: DEFENDO AIR/WATER/SUCTION AND BIOPSY VALVE

## (undated) DEVICE — SPECIMEN TRAP LUKI

## (undated) DEVICE — ENDOSCOPY PACK - LOWER: Brand: MEDLINE INDUSTRIES, INC.

## (undated) NOTE — MR AVS SNAPSHOT
[place] [address]  [phone]               Thank you for choosing us for your health care visit with Tonya Arboleda MD.  We are glad to serve you and happy to provide you with this summar Take 1 tablet (20 mg total) by mouth daily. What changed:    - when to take this  - reasons to take this   Commonly known as:  LASIX           guaiFENesin-codeine 100-10 MG/5ML Soln   Take 5 mL by mouth every 6 (six) hours as needed for cough.    Commonly Maria L.tn

## (undated) NOTE — LETTER
04/16/20    [address] [facility]      Dear Sol Rangel records indicate that you have outstanding lab work and or testing that was ordered for you and has not yet been completed:  Orders Placed This Encounter

## (undated) NOTE — LETTER
01/16/20        [address] [facility]      Dear Nena Carbajal records indicate that you have outstanding lab work and or testing that was ordered for you and has not yet been completed:  Orders Placed This Encounte

## (undated) NOTE — LETTER
No referring provider defined for this encounter. 12/28/21        Patient: Kiley Escalera   YOB: 1955   Date of Visit: 12/28/2021       Dear  Dr. Ivette Anthony MD,      Thank you for referring Kiley Escalera to my practice. [id number]    Document electronically generated by:  Roxanne Crawford MD

## (undated) NOTE — LETTER
AUTHORIZATION FOR SURGICAL OPERATION OR OTHER PROCEDURE    1.  I hereby authorize Dr. Esthela Rojas, and [facility] staff assigned to my case to perform the following operation and/or procedure at the [facility]:    __________________ Time:  ________ A. M.  P.M.        Patient Name:  ______________________________________________________  (please print)      Patient signature:  ___________________________________________________             Relationship to Patient:           []  Pa

## (undated) NOTE — MR AVS SNAPSHOT
[place] [address]  [phone]               Thank you for choosing us for your health care visit with Ronny Mckenzie MD.  We are glad to serve you and happy to provide you with this summar acquired. Please contact the Patient Business Office at [phone] if you have any questions related to insurance coverage. Thank you.          Reason for Today's Visit     Cough           Medical Issues Discussed Today     Cough    Diarrhea of presumed Commonly known as:  BD LUER-MARGARITO SYRINGE                Where to Get Your Medications      You can get these medications from any pharmacy     Bring a paper prescription for each of these medications    - guaiFENesin-codeine 100-10 MG/5ML Soln            My

## (undated) NOTE — LETTER
[place] [address]  Authorization for Invasive Procedures  Date: 11/08/2024           Time: 1757    I hereby authorize Dr. Newton, my physician and his/her assistants (if applicable), which may include medical students, residents, and/or fellows, to perform the following surgical operation/ procedure and administer such anesthesia as may be determined necessary by my physician: Insertion of a Dual Chamber Pacemaker on Qing Miranda  2.   I recognize that during the surgical operation/procedure, unforeseen conditions may necessitate additional or different procedures than those listed above.  I, therefore, further authorize and request that the above-named surgeon, assistants, or designees perform such procedures as are, in their judgment, necessary and desirable.    3.   My surgeon/physician has discussed prior to my surgery the potential benefits, risks and side effects of this procedure; the likelihood of achieving goals; and potential problems that might occur during recuperation.  They also discussed reasonable alternatives to the procedure, including risks, benefits, and side effects related to the alternatives and risks related to not receiving this procedure.  I have had all my questions answered and I acknowledge that no guarantee has been made as to the result that may be obtained.    4.   Should the need arise during my operation/procedure, which includes change of level of care prior to discharge, I also consent to the administration of blood and/or blood products.  Further, I understand that despite careful testing and screening of blood or blood products by collecting agencies, I may still be subject to ill effects as a result of receiving a blood transfusion and/or blood products.  The following are some, but not all, of the potential risks that can occur: fever and allergic reactions, hemolytic reactions, transmission of diseases such as Hepatitis, AIDS and  Cytomegalovirus (CMV) and fluid overload.  In the event that I wish to have an autologous transfusion of my own blood, or a directed donor transfusion, I will discuss this with my physician.   Check only if Refusing Blood or Blood Products  I understand refusal of blood or blood products as deemed necessary by my physician may have serious consequences to my condition to include possible death. I hereby assume responsibility for my refusal and release the hospital, its personnel, and my physicians from any responsibility for the consequences of my refusal.         o  Refuse         5.   I authorize the use of any specimen, organs, tissues, body parts or foreign objects that may be removed from my body during the operation/procedure for diagnosis, research or teaching purposes and their subsequent disposal by hospital authorities.  I also authorize the release of specimen test results and/or written reports to my treating physician on the hospital medical staff or other referring or consulting physicians involved in my care, at the discretion of the Pathologist or my treating physician.    6.   I consent to the photographing or videotaping of the operations or procedures to be performed, including appropriate portions of my body for medical, scientific, or educational purposes, provided my identity is not revealed by the pictures or by descriptive texts accompanying them.  If the procedure has been photographed/videotaped, the surgeon will obtain the original picture, image, videotape or CD.  The hospital will not be responsible for storage, release or maintenance of the picture, image, tape or CD.    7.   I consent to the presence of a  or observers in the operating room as deemed necessary by my physician or their designees.    8.   I recognize that in the event my procedure results in extended X-Ray/fluoroscopy time, I may develop a skin reaction.    9. If I have a Do Not Attempt Resuscitation  (DNAR) order in place, that status will be suspended while in the operating room, procedural suite, and during the recovery period unless otherwise explicitly stated by me (or a person authorized to consent on my behalf). The surgeon or my attending physician will determine when the applicable recovery period ends for purposes of reinstating the DNAR order.  10. Patients having a sterilization procedure: I understand that if the procedure is successful the results will be permanent and it will therefore be impossible for me to inseminate, conceive, or bear children.  I also understand that the procedure is intended to result in sterility, although the result has not been guaranteed.   11. I acknowledge that my physician has explained sedation/analgesia administration to me including the risk and benefits I consent to the administration of sedation/analgesia as may be necessary or desirable in the judgment of my physician.    I CERTIFY THAT I HAVE READ AND FULLY UNDERSTAND THE ABOVE CONSENT TO OPERATION and/or OTHER PROCEDURE.        ____________________________________       _________________________________      ______________________________  Signature of Patient         Signature of Responsible Person        Printed Name of Responsible Person        ____________________________________      _________________________________      ______________________________       Signature of Witness          Relationship to Patient                       Date                                       Time  Patient Name: Qing Miranda  : 1955    Reviewed: 2024   Printed: 2024  Medical Record #: [id number] Page 1 of 2             STATEMENT OF PHYSICIAN My signature below affirms that prior to the time of the procedure; I have explained to the patient and/or his/her legal representative, the risks and benefits involved in the proposed treatment and any reasonable alternative to the proposed  treatment. I have also explained the risks and benefits involved in refusal of the proposed treatment and alternatives to the proposed treatment and have answered the patient's questions. If I have a significant financial interest in a co-management agreement or a significant financial interest in any product or implant, or other significant relationship used in this procedure/surgery, I have disclosed this and had a discussion with my patient.     _______________________________________________________________ _____________________________  (Signature of Physician)                                                                                         (Date)                                   (Time)  Patient Name: Qing Miranda  : 1955    Reviewed: 2024   Printed: 2024  Medical Record #: [id number] Page 2 of 2

## (undated) NOTE — LETTER
Jos[facility], [address] [address]  [address] [facility],  [address]       11/29/21        Patient: Elif De Guzman   YOB: 1955   Date of Visit: 11/29/2021       Dear  Dr. Zackary Sebastian MD,      Thank you for referring [place] [facility]. Review of system the patient was states she has been doing well without any chest pain, shortness of breath, GI or urinary tract symptoms. Currently denies any dysuria, frequency or gross hematuria.  Still occasional left lower back

## (undated) NOTE — LETTER
November 9, 2021    [address] [facility]    Dear Edvin Arteaga: It was a pleasure speaking with you over the phone recently.  To follow up, I wanted to send you some contact information to utilize when you have a que

## (undated) NOTE — LETTER
AUTHORIZATION FOR SURGICAL OPERATION OR OTHER PROCEDURE    1.  I hereby authorize Dr. Sera Kong, and [facility] staff assigned to my case to perform the following operation and/or procedure at the [facility]:    _______________________________ ________ A. M.  P.M.        Patient Name:  ______________________________________________________  (please print)      Patient signature:  ___________________________________________________             Relationship to Patient:           []  Parent    Respon

## (undated) NOTE — Clinical Note
FYI, TCM call made, see NCM notes. NCM Confirmed PCP office visit, NCM changed visit type to TCM appointment with patient on 2/23/2024 at 2:30 pm

## (undated) NOTE — LETTER
8/17/2017              [address] [address]         To whom it may concern,      This  Is to certify that Ms Stacy Youssef is our patient and under our care.  Due to her medical issues, she was [place]

## (undated) NOTE — LETTER
3/26/2019              [address] [place] [id number]         Dear Faith Keane records indicate that the tests ordered for you by Christen Angeles MD  have not been done.   If you have, in fact, already com

## (undated) NOTE — Clinical Note
Transitional Care Management call completed. A Transitional Care Management appointment is scheduled for 11/21/2024. Thank you.

## (undated) NOTE — MR AVS SNAPSHOT
[address], [address]  [address] [facility]  [phone]               Thank you for choosing us for your health care visit with Ronny Mckenzie MD.  We are glad to serve you and happy to provide you with this s Beclomethasone Dipropionate 40 MCG/ACT Aers   Inhale 2 puffs (0.08 mg total) into the lungs 2 (two) times daily.    Commonly known as:  QVAR           cyanocobalamin 1000 MCG/ML Soln   Inject 1 ml (1,000 mcg total) into the muscle every 15 days   Commonly

## (undated) NOTE — MR AVS SNAPSHOT
[address]  [phone]               Thank you for choosing us for your health care visit with Loida Johnson MD.  We are glad to serve you and happy to provide you with this summar Commonly known as:  CHERATUSSIN AC           IRRITATED EYE RELIEF OP   Apply to eye. Potassium Chloride ER 10 MEQ Tbcr   Take 1 tablet (10 mEq total) by mouth daily.    What changed:    - when to take this  - reasons to take this   Commonly known

## (undated) NOTE — Clinical Note
TCM call completed. A TCM-[facility] appointment is scheduled for 10/18/2021. The patient does admit to ongoing flank pain that is managed with tylenol at home. The patient reported plans to hold lisinopril until 10/18/2021.  A TE was sent to the office reporting

## (undated) NOTE — MR AVS SNAPSHOT
[address] [place] [address]  [phone]               Thank you for choosing us for your health care visit with Anh Nixon MD.  We are glad to serve you and happy to provide you with this summar Fluticasone Propionate 50 MCG/ACT Susp   SHAKE LIQUID AND USE 2 SPRAYS IN EACH NOSTRIL DAILY   Commonly known as:  FLONASE           furosemide 20 MG Tabs   Take 1 tablet (20 mg total) by mouth daily.    What changed:    - when to take this  - reasons to t